# Patient Record
Sex: MALE | Race: WHITE | NOT HISPANIC OR LATINO | Employment: OTHER | URBAN - METROPOLITAN AREA
[De-identification: names, ages, dates, MRNs, and addresses within clinical notes are randomized per-mention and may not be internally consistent; named-entity substitution may affect disease eponyms.]

---

## 2017-02-08 ENCOUNTER — PATIENT MESSAGE (OUTPATIENT)
Dept: INTERNAL MEDICINE | Facility: CLINIC | Age: 52
End: 2017-02-08

## 2017-03-01 ENCOUNTER — OFFICE VISIT (OUTPATIENT)
Dept: INTERNAL MEDICINE | Facility: CLINIC | Age: 52
End: 2017-03-01
Payer: COMMERCIAL

## 2017-03-01 VITALS
OXYGEN SATURATION: 97 % | BODY MASS INDEX: 32.22 KG/M2 | WEIGHT: 225.06 LBS | HEIGHT: 70 IN | HEART RATE: 69 BPM | TEMPERATURE: 98 F | DIASTOLIC BLOOD PRESSURE: 70 MMHG | SYSTOLIC BLOOD PRESSURE: 118 MMHG

## 2017-03-01 DIAGNOSIS — Z12.83 SKIN CANCER SCREENING: ICD-10-CM

## 2017-03-01 DIAGNOSIS — M54.2 CHRONIC NECK AND BACK PAIN: Primary | ICD-10-CM

## 2017-03-01 DIAGNOSIS — E78.5 HYPERLIPIDEMIA, UNSPECIFIED HYPERLIPIDEMIA TYPE: ICD-10-CM

## 2017-03-01 DIAGNOSIS — M54.9 CHRONIC NECK AND BACK PAIN: Primary | ICD-10-CM

## 2017-03-01 DIAGNOSIS — E05.90 HYPERTHYROIDISM: ICD-10-CM

## 2017-03-01 DIAGNOSIS — G89.29 CHRONIC NECK AND BACK PAIN: Primary | ICD-10-CM

## 2017-03-01 PROCEDURE — 99999 PR PBB SHADOW E&M-EST. PATIENT-LVL IV: CPT | Mod: PBBFAC,,, | Performed by: INTERNAL MEDICINE

## 2017-03-01 PROCEDURE — 1160F RVW MEDS BY RX/DR IN RCRD: CPT | Mod: S$GLB,,, | Performed by: INTERNAL MEDICINE

## 2017-03-01 PROCEDURE — 99214 OFFICE O/P EST MOD 30 MIN: CPT | Mod: S$GLB,,, | Performed by: INTERNAL MEDICINE

## 2017-03-01 NOTE — MR AVS SNAPSHOT
[facility]  [address]  [facility] [address]  Phone: [phone]  Fax: [phone]                  Narciso Mckeon   3/1/2017 10:40 AM   Office Visit    Description:  Male : 1965   Provider:  Migdalia Connor MD   Department:  [facility]           Reason for Visit     Follow-up           Diagnoses this Visit        Comments    Chronic neck and back pain    -  Primary     Hyperthyroidism         Hyperlipidemia, unspecified hyperlipidemia type         Skin cancer screening                To Do List           Goals (5 Years of Data)     None      Follow-Up and Disposition     Return in about 6 months (around 2017).      [facility]     [facility] Care Line -  Assistance  Registered nurses in the [facility] Call Center provide clinical advisement, health education, appointment booking, and other advisory services.  Call for this free service at [phone].             Medications           Message regarding Medications     Verify the changes and/or additions to your medication regime listed below are the same as discussed with your clinician today.  If any of these changes or additions are incorrect, please notify your healthcare provider.        STOP taking these medications     influenza vaccine 60 mcg/0.5 mL injection            Verify that the below list of medications is an accurate representation of the medications you are currently taking.  If none reported, the list may be blank. If incorrect, please contact your healthcare provider. Carry this list with you in case of emergency.           Current Medications     aspirin (ECOTRIN) 81 MG EC tablet Take 1 tablet (81 mg total) by mouth once daily.    clonazePAM (KLONOPIN) 0.5 MG tablet Take 1 tablet (0.5 mg total) by mouth nightly as needed for Anxiety.    gabapentin (NEURONTIN) 300 MG capsule TAKE ONE CAPSULE BY MOUTH THREE TIMES DAILY    meclizine (ANTIVERT) 25 mg tablet  Take 1 tablet (25 mg total) by mouth 3 (three) times daily as needed for Dizziness.    meloxicam (MOBIC) 7.5 MG tablet Take 7.5 mg by mouth 2 (two) times daily.    rosuvastatin (CRESTOR) 20 MG tablet Take 0.5 tablets (10 mg total) by mouth once daily.    tizanidine (ZANAFLEX) 4 MG tablet Take 2 mg by mouth every 8 (eight) hours.            Clinical Reference Information           Your Vitals Were     BP                   118/70           Blood Pressure          Most Recent Value    BP  118/70      Allergies as of 3/1/2017     Bactrim [Sulfamethoxazole-trimethoprim]      Immunizations Administered on Date of Encounter - 3/1/2017     None      Orders Placed During Today's Visit      Normal Orders This Visit    Ambulatory Referral to Dermatology     Future Labs/Procedures Expected by Expires    Comprehensive metabolic panel  3/1/2017 (Approximate) 4/30/2018    Lipid panel  3/1/2017 4/30/2018    TSH  3/1/2017 4/30/2018      Language Assistance Services     ATTENTION: Language assistance services are available, free of charge. Please call [phone].      ATENCIÓN: Si habla español, tiene a frost disposición servicios gratuitos de asistencia lingüística. Llame al [phone].     RUTHY Ý: N?u b?n nói Ti?ng Vi?t, có các d?ch v? h? tr? ngôn ng? mi?n phí dành cho b?n. G?i s? [phone].         [facility] complies with applicable Federal civil rights laws and does not discriminate on the basis of race, color, national origin, age, disability, or sex.

## 2017-03-01 NOTE — PROGRESS NOTES
"Subjective:       Patient ID: Narciso Mckeon is a 51 y.o. male.    Chief Complaint: Follow-up    HPI   Back pain about the same. Neck pain w/ a pull feeling on the R neck. Aggravated by sitting a certain way. On gabapentin 300mg tid. Helps w/ sleep. Takes mobic 7.5mg bid, zanaflex 4mg q 8 prn. Has been doing a lot of cutting/trimming of backyard. Occasional myalgia in the hands. No numbness/tingling/weakness of the arms. Slight weight gain. When he wakes up in the morning he'll have some morning stiffness for a min. Occ w/ flutter in the chest that'll last 3 seconds and then resolves. Does not occur every week. Not assoc w/ SOB/dizziness/diaphoresis/nausea/vomiting. Assoc w/ stress.    C/o more snappy w/ wife. No depression.     CT chest 11/1/16 - R lung nodule - repeat in 6 mo.     Review of Systems  as above in HPI.     Objective:      Physical Exam    /70  Pulse 69  Temp 98.3 °F (36.8 °C)  Ht 5' 10" (1.778 m)  Wt 102.1 kg (225 lb 1.4 oz)  SpO2 97%  BMI 32.3 kg/m2    GEN - A+OX4, NAD   HEENT - PERRL, EOMI, OP clear  Neck - No thyromegaly or cervical LAD. No thyroid masses felt.  CV - RRR, no m/r   Chest - CTAB, no wheezing or rhonchi  Abd - S/NT/ND/+BS.   Ext - 2+BDP and radial pulses. No C/C/E.    Labs reviewed    Assessment/Plan     Narciso was seen today for follow-up.    Diagnoses and all orders for this visit:    Chronic neck and back pain - declines PT at this time. Previously FRANKY worked but pt reports he didn't have any anesthesia prior to the FRANKY and so it caused a lot of anxiety/pain to the point where he almost passed out. Would like to defer at this time.   -     mobic 7.5mg bid, gabapentin 300mg tid, zanaflex prn.     Hyperthyroidism  -     TSH; Future    Hyperlipidemia, unspecified hyperlipidemia type - Crestor 10mg daily.   -     Lipid panel; Future  -     Comprehensive metabolic panel; Future    Recommended trial of marriage counseling.  Return in about 6 months (around " 9/1/2017).      Migdalia Connor MD  Department of Internal Medicine - [facility]  10:58 AM

## 2017-04-03 DIAGNOSIS — M54.9 CHRONIC BACK PAIN: ICD-10-CM

## 2017-04-03 DIAGNOSIS — G89.29 CHRONIC BACK PAIN: ICD-10-CM

## 2017-04-03 RX ORDER — GABAPENTIN 300 MG/1
CAPSULE ORAL
Qty: 90 CAPSULE | Refills: 3 | Status: SHIPPED | OUTPATIENT
Start: 2017-04-03 | End: 2018-02-21 | Stop reason: SDUPTHER

## 2017-11-02 ENCOUNTER — TELEPHONE (OUTPATIENT)
Dept: GASTROENTEROLOGY | Facility: CLINIC | Age: 52
End: 2017-11-02

## 2017-11-02 DIAGNOSIS — K82.4 GALL BLADDER POLYP: Primary | ICD-10-CM

## 2017-11-02 NOTE — TELEPHONE ENCOUNTER
----- Message from Chad Webber MD sent at 11/2/2017  3:33 PM CDT -----  Patient need a a follow up US for GB polyp.  Chad Webber MD  ----- Message -----  From: Joann Mcclain MA  Sent: 11/2/2017   2:41 PM  To: Chad Webber MD    Does he need any follow up?

## 2017-11-03 ENCOUNTER — TELEPHONE (OUTPATIENT)
Dept: GASTROENTEROLOGY | Facility: CLINIC | Age: 52
End: 2017-11-03

## 2017-11-06 ENCOUNTER — TELEPHONE (OUTPATIENT)
Dept: GASTROENTEROLOGY | Facility: CLINIC | Age: 52
End: 2017-11-06

## 2017-11-06 NOTE — TELEPHONE ENCOUNTER
----- Message from Migdalia Jemima sent at 11/6/2017  1:11 PM CST -----  Contact: wife - lourdes neri - [phone]  martel - tejals u/s done on [facility] - please call wife - lourdes neri - [phone]

## 2017-11-08 ENCOUNTER — PATIENT MESSAGE (OUTPATIENT)
Dept: INTERNAL MEDICINE | Facility: CLINIC | Age: 52
End: 2017-11-08

## 2017-11-08 DIAGNOSIS — R91.1 PULMONARY NODULE: Primary | ICD-10-CM

## 2017-11-09 ENCOUNTER — HOSPITAL ENCOUNTER (OUTPATIENT)
Dept: RADIOLOGY | Facility: HOSPITAL | Age: 52
Discharge: HOME | End: 2017-11-09
Attending: INTERNAL MEDICINE
Payer: COMMERCIAL

## 2017-11-09 DIAGNOSIS — R91.1 PULMONARY NODULE: ICD-10-CM

## 2017-11-09 DIAGNOSIS — K82.4 GALL BLADDER POLYP: ICD-10-CM

## 2017-11-09 PROCEDURE — 71250 CT THORAX DX C-: CPT | Mod: TC

## 2017-11-09 PROCEDURE — 71250 CT THORAX DX C-: CPT | Mod: 26,,, | Performed by: RADIOLOGY

## 2017-11-09 PROCEDURE — 76700 US EXAM ABDOM COMPLETE: CPT | Mod: 26,,, | Performed by: RADIOLOGY

## 2017-11-09 PROCEDURE — 76700 US EXAM ABDOM COMPLETE: CPT | Mod: TC

## 2017-11-13 ENCOUNTER — TELEPHONE (OUTPATIENT)
Dept: GASTROENTEROLOGY | Facility: CLINIC | Age: 52
End: 2017-11-13

## 2017-11-13 DIAGNOSIS — R16.1 SPLEEN ENLARGED: Primary | ICD-10-CM

## 2017-11-13 NOTE — TELEPHONE ENCOUNTER
----- Message from Chad Webber MD sent at 11/11/2017  8:05 AM CST -----  Please let the patient know the US did not showed the GB polyp. The spleen was slightly enlarge. Need repeat US to follow up the spleen in 3 months.

## 2017-11-15 ENCOUNTER — TELEPHONE (OUTPATIENT)
Dept: GASTROENTEROLOGY | Facility: CLINIC | Age: 52
End: 2017-11-15

## 2017-11-15 NOTE — TELEPHONE ENCOUNTER
Result Notes     Notes Recorded by Chad Webber MD on 11/11/2017 at 8:05 AM CST  Please let the patient know the US did not showed the GB polyp. The spleen was slightly enlarge. Need repeat US to follow up the spleen in 3 months.     Patient informed. Patient did not want to schedule US at this time. He will call back.

## 2018-02-01 ENCOUNTER — PATIENT MESSAGE (OUTPATIENT)
Dept: INTERNAL MEDICINE | Facility: CLINIC | Age: 53
End: 2018-02-01

## 2018-02-01 ENCOUNTER — OFFICE VISIT (OUTPATIENT)
Dept: DERMATOLOGY | Facility: CLINIC | Age: 53
End: 2018-02-01
Payer: MEDICARE

## 2018-02-01 VITALS — HEIGHT: 70 IN | BODY MASS INDEX: 32.21 KG/M2 | WEIGHT: 225 LBS

## 2018-02-01 DIAGNOSIS — D23.9 ANGIOKERATOMA: ICD-10-CM

## 2018-02-01 DIAGNOSIS — L30.9 HAND DERMATITIS: ICD-10-CM

## 2018-02-01 DIAGNOSIS — L81.4 SOLAR LENTIGO: ICD-10-CM

## 2018-02-01 DIAGNOSIS — L82.1 SEBORRHEIC KERATOSES: Primary | ICD-10-CM

## 2018-02-01 DIAGNOSIS — D22.9 MULTIPLE BENIGN NEVI: ICD-10-CM

## 2018-02-01 PROCEDURE — 99999 PR PBB SHADOW E&M-EST. PATIENT-LVL II: CPT | Mod: PBBFAC,,, | Performed by: DERMATOLOGY

## 2018-02-01 PROCEDURE — 99212 OFFICE O/P EST SF 10 MIN: CPT | Mod: PBBFAC,PO | Performed by: DERMATOLOGY

## 2018-02-01 PROCEDURE — 99203 OFFICE O/P NEW LOW 30 MIN: CPT | Mod: S$GLB,,, | Performed by: DERMATOLOGY

## 2018-02-01 RX ORDER — BETAMETHASONE DIPROPIONATE 0.5 MG/G
OINTMENT TOPICAL
Qty: 45 G | Refills: 1 | Status: SHIPPED | OUTPATIENT
Start: 2018-02-01

## 2018-02-01 RX ORDER — ROSUVASTATIN CALCIUM 10 MG/1
10 TABLET, COATED ORAL DAILY
Qty: 90 TABLET | Refills: 3 | Status: SHIPPED | OUTPATIENT
Start: 2018-02-01 | End: 2019-02-22 | Stop reason: SDUPTHER

## 2018-02-01 RX ORDER — MULTIVIT WITH MINERALS/HERBS
1 TABLET ORAL DAILY
COMMUNITY

## 2018-02-01 NOTE — PROGRESS NOTES
Subjective:       Patient ID:  Narciso Mckeon is a 52 y.o. 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   Skin: Positive for dry skin, activity-related sunscreen use and wears hat.   Hematologic/Lymphatic: Bruises/bleeds easily.        Objective:    Physical Exam   Constitutional: He appears well-developed and well-nourished. No distress.   Neurological: He is alert and oriented to person, place, and time. He is not disoriented.   Psychiatric: He has a normal mood and affect.   Skin:   Areas Examined (abnormalities noted in diagram):   Scalp / Hair Palpated and Inspected  Head / Face Inspection Performed  Neck Inspection Performed  Chest / Axilla Inspection Performed  Abdomen Inspection Performed  Genitals / Buttocks / Groin Inspection Performed  Back Inspection Performed  RUE Inspected  LUE Inspection Performed  RLE Inspected  LLE Inspection Performed  Nails and Digits Inspection Performed                       Diagram Legend     Erythematous scaling macule/papule c/w actinic keratosis       Vascular papule c/w angioma      Pigmented verrucoid papule/plaque c/w seborrheic keratosis      Yellow umbilicated papule c/w sebaceous hyperplasia      Irregularly shaped tan macule c/w lentigo     1-2 mm smooth white  "papules consistent with Milia      Movable subcutaneous cyst with punctum c/w epidermal inclusion cyst      Subcutaneous movable cyst c/w pilar cyst      Firm pink to brown papule c/w dermatofibroma      Pedunculated fleshy papule(s) c/w skin tag(s)      Evenly pigmented macule c/w junctional nevus     Mildly variegated pigmented, slightly irregular-bordered macule c/w mildly atypical nevus      Flesh colored to evenly pigmented papule c/w intradermal nevus       Pink pearly papule/plaque c/w basal cell carcinoma      Erythematous hyperkeratotic cursted plaque c/w SCC      Surgical scar with no sign of skin cancer recurrence      Open and closed comedones      Inflammatory papules and pustules      Verrucoid papule consistent consistent with wart     Erythematous eczematous patches and plaques     Dystrophic onycholytic nail with subungual debris c/w onychomycosis     Umbilicated papule    Erythematous-base heme-crusted tan verrucoid plaque consistent with inflamed seborrheic keratosis     Erythematous Silvery Scaling Plaque c/w Psoriasis     See annotation      Assessment / Plan:        Seborrheic keratoses  These are benign inherited growths without a malignant potential. Reassurance given to patient. No treatment is necessary.     Solar lentigo  This is a benign hyperpigmented sun induced lesion. Daily sun protection will reduce the number of new lesions. Treatment of these benign lesions are considered cosmetic.    Angiokeratoma, scrotum,  Reassurance    Multiple benign nevi   Monitor for new mole or moles that are becoming bigger, darker, irritated, or developing irregular borders.    Hand dermatitis,  Continue O"Soren working hand  -     betamethasone dipropionate (DIPROLENE) 0.05 % ointment; AAA hand BID PRN flare  Dispense: 45 g; Refill: 1    Patient instructed in importance in daily sun protection of at least spf 30. Sun avoidance and topical protection discussed.   Recommend Elta MD (physician office) COTMAN " sensitive (available online) for daily use on face and neck.  Patient encouraged to wear hat for all outdoor exposure.   Also discussed sun protective clothing.             Follow-up in about 1 year (around 2/1/2019).

## 2018-02-21 ENCOUNTER — HOSPITAL ENCOUNTER (OUTPATIENT)
Dept: RADIOLOGY | Facility: HOSPITAL | Age: 53
Discharge: HOME | End: 2018-02-21
Attending: INTERNAL MEDICINE
Payer: COMMERCIAL

## 2018-02-21 ENCOUNTER — OFFICE VISIT (OUTPATIENT)
Dept: INTERNAL MEDICINE | Facility: CLINIC | Age: 53
End: 2018-02-21
Payer: COMMERCIAL

## 2018-02-21 VITALS
BODY MASS INDEX: 31.19 KG/M2 | HEIGHT: 70 IN | SYSTOLIC BLOOD PRESSURE: 110 MMHG | TEMPERATURE: 98 F | DIASTOLIC BLOOD PRESSURE: 70 MMHG | RESPIRATION RATE: 16 BRPM | WEIGHT: 217.88 LBS | HEART RATE: 99 BPM

## 2018-02-21 DIAGNOSIS — E05.00 GRAVES DISEASE: ICD-10-CM

## 2018-02-21 DIAGNOSIS — G89.29 CHRONIC LOW BACK PAIN, UNSPECIFIED BACK PAIN LATERALITY, WITH SCIATICA PRESENCE UNSPECIFIED: ICD-10-CM

## 2018-02-21 DIAGNOSIS — F41.9 ANXIETY: ICD-10-CM

## 2018-02-21 DIAGNOSIS — R16.1 SPLEEN ENLARGED: ICD-10-CM

## 2018-02-21 DIAGNOSIS — M54.5 CHRONIC LOW BACK PAIN, UNSPECIFIED BACK PAIN LATERALITY, WITH SCIATICA PRESENCE UNSPECIFIED: ICD-10-CM

## 2018-02-21 DIAGNOSIS — M54.2 CHRONIC NECK PAIN: ICD-10-CM

## 2018-02-21 DIAGNOSIS — M79.10 MYALGIA: ICD-10-CM

## 2018-02-21 DIAGNOSIS — G47.00 INSOMNIA, UNSPECIFIED TYPE: ICD-10-CM

## 2018-02-21 DIAGNOSIS — E78.5 HYPERLIPIDEMIA, UNSPECIFIED HYPERLIPIDEMIA TYPE: ICD-10-CM

## 2018-02-21 DIAGNOSIS — M54.6 CHRONIC THORACIC SPINE PAIN: Primary | ICD-10-CM

## 2018-02-21 DIAGNOSIS — G89.29 CHRONIC PAIN OF LEFT KNEE: ICD-10-CM

## 2018-02-21 DIAGNOSIS — M25.562 CHRONIC PAIN OF LEFT KNEE: ICD-10-CM

## 2018-02-21 DIAGNOSIS — G89.29 CHRONIC THORACIC SPINE PAIN: Primary | ICD-10-CM

## 2018-02-21 DIAGNOSIS — G89.29 CHRONIC NECK PAIN: ICD-10-CM

## 2018-02-21 PROCEDURE — 99215 OFFICE O/P EST HI 40 MIN: CPT | Mod: S$GLB,,, | Performed by: INTERNAL MEDICINE

## 2018-02-21 PROCEDURE — 99999 PR PBB SHADOW E&M-EST. PATIENT-LVL IV: CPT | Mod: PBBFAC,,, | Performed by: INTERNAL MEDICINE

## 2018-02-21 PROCEDURE — 99214 OFFICE O/P EST MOD 30 MIN: CPT | Mod: PBBFAC,25 | Performed by: INTERNAL MEDICINE

## 2018-02-21 PROCEDURE — 73562 X-RAY EXAM OF KNEE 3: CPT | Mod: TC,50

## 2018-02-21 PROCEDURE — 76700 US EXAM ABDOM COMPLETE: CPT | Mod: TC

## 2018-02-21 PROCEDURE — 73562 X-RAY EXAM OF KNEE 3: CPT | Mod: 26,50,, | Performed by: RADIOLOGY

## 2018-02-21 PROCEDURE — 76700 US EXAM ABDOM COMPLETE: CPT | Mod: 26,GC,, | Performed by: RADIOLOGY

## 2018-02-21 RX ORDER — OXYCODONE HYDROCHLORIDE 15 MG/1
15 TABLET ORAL DAILY PRN
COMMUNITY
Start: 2018-02-20

## 2018-02-21 RX ORDER — GABAPENTIN 300 MG/1
300 CAPSULE ORAL 3 TIMES DAILY
Qty: 90 CAPSULE | Refills: 11 | Status: SHIPPED | OUTPATIENT
Start: 2018-02-21 | End: 2019-09-30 | Stop reason: SDUPTHER

## 2018-02-21 RX ORDER — CLONAZEPAM 0.5 MG/1
0.5 TABLET ORAL NIGHTLY PRN
Qty: 30 TABLET | Refills: 0 | Status: SHIPPED | OUTPATIENT
Start: 2018-02-21 | End: 2022-02-16

## 2018-02-21 NOTE — PROGRESS NOTES
INTERNAL MEDICINE YEARLY VISIT NOTE      CHIEF COMPLAINT     Chief Complaint   Patient presents with    YEARLY    Low-back Pain     5/10       HPI     Narciso Mckeon is a 52 y.o. C male who presents for his yearly exam. Last seen 3/1/17.    Neck/LBP. Chronic. Gabapentin 300mg TID, mobic 7.5mg BID, zanaflex 4mg q8prn, oxycodone 15mg daily prn. Follows w/ Dr. Conner in [place].   C/o click in between the shoulder blades w/ wrong movement - started a few mo ago. No trauma. Occurring few days a week. Last for a second and then goes away. R middle 3 fingers periodically w/ numbness but this is chronic and ongoing for yrs. Reports when the clicking, he jerks his body and sometimes feels like he has tingling in his L leg from it.     C/o L knee pain. H/o torn meniscus and also frequent dislocation of the patella s/p L knee surgery 2014/2015. Pain on the outer knee that goes to the shin - last for a few hours. Pain is intermittent - occurs 3x/week. Started about a few mo ago. No trauma/inciting factor. Able to bare weight on the knees. Has ortho appt on Monday.     GB polyp not present on repeat US. Slightly enlarged spleen. Per phone note w/ GI, needs repeat US to f/u on the spleen in 3 mo from last US. Due. Scheduled for today.    H/o hyperthyroidism - last 2 yrs of TSH WNL.    HLD - crestor 10mg daily.  C/o myalgia in the hands sometimes.     Insomnia - wakes up at 2am every night. Son's pregnant fiancee passed away suddenly. When his mom passed away, was on short course of clonopin, which worked for him. Worried about son. Anxiety.     Past Medical History:  Past Medical History:   Diagnosis Date    Chronic neck and back pain     Graves disease     Hyperthyroidism 11/18/2015     Past Surgical History:  Past Surgical History:   Procedure Laterality Date    FINGER SURGERY      removal of steel    KNEE SURGERY Left 1/2015     Allergies:  Review of patient's allergies indicates:   Allergen Reactions     Bactrim [sulfamethoxazole-trimethoprim]      Possibly allergic reaction       Home Medications:  Prior to Admission medications    Medication Sig Start Date End Date Taking? Authorizing Provider   ASPIRIN (ASPIR-81 ORAL) Take by mouth.   Yes Historical Provider, MD   b complex vitamins tablet Take 1 tablet by mouth once daily.   Yes Historical Provider, MD   betamethasone dipropionate (DIPROLENE) 0.05 % ointment AAA hand BID PRN flare 2/1/18  Yes Alysia Orbien MD   gabapentin (NEURONTIN) 300 MG capsule TAKE ONE CAPSULE BY MOUTH THREE TIMES DAILY 4/3/17  Yes Migdalia Connor MD   meclizine (ANTIVERT) 25 mg tablet Take 1 tablet (25 mg total) by mouth 3 (three) times daily as needed for Dizziness. 11/9/16  Yes Migdalia Connor MD   meloxicam (MOBIC) 7.5 MG tablet Take 7.5 mg by mouth 2 (two) times daily. 9/3/16  Yes Historical Provider, MD   multivitamin capsule Take 1 capsule by mouth once daily.   Yes Historical Provider, MD   oxyCODONE (ROXICODONE) 15 MG Tab Take 15 mg by mouth daily as needed. 2/20/18  Yes Historical Provider, MD   rosuvastatin (CRESTOR) 10 MG tablet Take 1 tablet (10 mg total) by mouth once daily. 2/1/18 2/1/19 Yes Migdalia Connor MD   tizanidine (ZANAFLEX) 4 MG tablet Take 2 mg by mouth every 8 (eight) hours.  1/27/16  Yes Historical Provider, MD   clonazePAM (KLONOPIN) 0.5 MG tablet Take 1 tablet (0.5 mg total) by mouth nightly as needed for Anxiety. 12/7/16 12/7/17  Migdalia Connor MD       Family History:  Family History   Problem Relation Age of Onset    Heart disease Father 52     MI    Macular degeneration Mother     Melanoma Mother     Lupus Neg Hx     Eczema Neg Hx     Psoriasis Neg Hx        Social History:  Social History   Substance Use Topics    Smoking status: Former Smoker     Quit date: 11/2/2014    Smokeless tobacco: Never Used    Alcohol use Yes      Comment: occas       Review of Systems:  Review of Systems Comprehensive review of systems otherwise negative. See history/subjective section for  "more details.    Health Maintainence:   Td - reports UTD.  Flu  - needs flu vaccine.   C-SCOPE 4/1/15  Hep C screening 8/10/16    PHYSICAL EXAM     /70   Pulse 99   Temp 98.2 °F (36.8 °C) (Oral)   Resp 16   Ht 5' 10" (1.778 m)   Wt 98.8 kg (217 lb 14.4 oz)   BMI 31.27 kg/m²     GEN - A+OX4, NAD   HEENT - PERRL, EOMI, OP clear. MMM.   Neck - No thyromegaly or cervical LAD. No thyroid masses felt.  CV - RRR, no m/r   Chest - CTAB, no wheezing or rhonchi  Abd - S/NT/ND/+BS.   Ext - 2+BDP and radial pulses. No LE edema.   Neuro - 5/5 BUE and BLE strength. Sensation to light touch intact throughout. 2+ DTRs. Antalgic gait.  MSK - No spinal tenderness to palpation. Antalgic gait.   Skin - No rash.    LABS     Previous labs reviewed.    ASSESSMENT/PLAN     Narciso Mckeon is a 52 y.o. male with  Narciso was seen today for annual exam and low-back pain.    Diagnoses and all orders for this visit:    Chronic thoracic spine pain  -     Ambulatory Referral to [facility]    H/O Graves disease  -     TSH; Future; Expected date: 02/21/2018    Chronic pain of left knee  -     X-ray Knee Ortho Bilateral; Future; Expected date: 02/21/2018    Hyperlipidemia, unspecified hyperlipidemia type  -     Comprehensive metabolic panel; Future; Expected date: 02/21/2018  -     Lipid panel; Future; Expected date: 02/21/2018    Chronic neck pain  -     gabapentin (NEURONTIN) 300 MG capsule; Take 1 capsule (300 mg total) by mouth 3 (three) times daily.    Chronic low back pain, unspecified back pain laterality, with sciatica presence unspecified  -     gabapentin (NEURONTIN) 300 MG capsule; Take 1 capsule (300 mg total) by mouth 3 (three) times daily.    Insomnia, unspecified type - klonopin prn. Do not combine w/ oxycodone or other sedating meds.    Anxiety  -     clonazePAM (KLONOPIN) 0.5 MG tablet; Take 1 tablet (0.5 mg total) by mouth nightly as needed for Anxiety.  -     TSH; Future; Expected date: " 02/21/2018    Myalgia  -     Comprehensive metabolic panel; Future; Expected date: 02/21/2018  -     Magnesium; Future; Expected date: 02/21/2018    BMI 31.27,adult  -     CBC auto differential; Future; Expected date: 02/21/2018    Other orders  -     oxyCODONE (ROXICODONE) 15 MG Tab; Take 15 mg by mouth daily as needed.    Flu vaccine today.    RTC in 12 months, sooner if needed and depending on labs.    Migdalia Connor MD  Department of Internal Medicine - [facility]  10:07 AM

## 2018-02-23 ENCOUNTER — TELEPHONE (OUTPATIENT)
Dept: GASTROENTEROLOGY | Facility: CLINIC | Age: 53
End: 2018-02-23

## 2018-02-23 NOTE — TELEPHONE ENCOUNTER
----- Message from Chad Webber MD sent at 2/22/2018  3:23 PM CST -----  Please let the patient know the US showed    Subtle nodular contour of the liver, may be seen in the settings of liver cirrhosis.    Splenomegaly.    No gallbladder polyp    Need Follow up with Hepatology

## 2018-02-24 ENCOUNTER — TELEPHONE (OUTPATIENT)
Dept: INTERNAL MEDICINE | Facility: CLINIC | Age: 53
End: 2018-02-24

## 2018-02-24 DIAGNOSIS — R73.02 IGT (IMPAIRED GLUCOSE TOLERANCE): Primary | ICD-10-CM

## 2018-02-24 NOTE — TELEPHONE ENCOUNTER
Please call and let patient know that blood count, electrolytes, kidney function, cholesterol, thyroid function are normal.  One of his liver enzymes is still mildly elevated and this is likely due to potential cirrhosis seen on abdominal ultrasound.  Please make sure he follows up with hepatology. His fasting sugar is mildly elevated so I'd like to get an a1c to check his avg glucose over 3 months. Please schedule.

## 2018-02-26 ENCOUNTER — OFFICE VISIT (OUTPATIENT)
Dept: ORTHOPEDICS | Facility: CLINIC | Age: 53
End: 2018-02-26
Payer: COMMERCIAL

## 2018-02-26 VITALS
WEIGHT: 217 LBS | HEART RATE: 77 BPM | DIASTOLIC BLOOD PRESSURE: 77 MMHG | HEIGHT: 70 IN | SYSTOLIC BLOOD PRESSURE: 127 MMHG | BODY MASS INDEX: 31.07 KG/M2

## 2018-02-26 DIAGNOSIS — M25.362 KNEE INSTABILITY, LEFT: Primary | ICD-10-CM

## 2018-02-26 PROCEDURE — 97760 ORTHOTIC MGMT&TRAING 1ST ENC: CPT | Mod: PBBFAC,PN | Performed by: ORTHOPAEDIC SURGERY

## 2018-02-26 PROCEDURE — 99203 OFFICE O/P NEW LOW 30 MIN: CPT | Mod: S$GLB,,, | Performed by: ORTHOPAEDIC SURGERY

## 2018-02-26 PROCEDURE — 99213 OFFICE O/P EST LOW 20 MIN: CPT | Mod: PBBFAC,PN | Performed by: ORTHOPAEDIC SURGERY

## 2018-02-26 PROCEDURE — 99999 PR PBB SHADOW E&M-EST. PATIENT-LVL III: CPT | Mod: PBBFAC,,, | Performed by: ORTHOPAEDIC SURGERY

## 2018-02-26 NOTE — TELEPHONE ENCOUNTER
- Called patient to schedule f/u with Dr. Pickens  - Patient handed phone to his wife to make appt.  - Patient's wife accepted and confirmed appt on 3/12.

## 2018-02-27 NOTE — PROGRESS NOTES
Past Medical History:   Diagnosis Date    Chronic neck and back pain     Graves disease     Hyperthyroidism 11/18/2015       Past Surgical History:   Procedure Laterality Date    FINGER SURGERY      removal of steel    KNEE SURGERY Left 1/2015       Current Outpatient Prescriptions   Medication Sig    ASPIRIN (ASPIR-81 ORAL) Take by mouth.    b complex vitamins tablet Take 1 tablet by mouth once daily.    betamethasone dipropionate (DIPROLENE) 0.05 % ointment AAA hand BID PRN flare    clonazePAM (KLONOPIN) 0.5 MG tablet Take 1 tablet (0.5 mg total) by mouth nightly as needed for Anxiety.    gabapentin (NEURONTIN) 300 MG capsule Take 1 capsule (300 mg total) by mouth 3 (three) times daily.    meclizine (ANTIVERT) 25 mg tablet Take 1 tablet (25 mg total) by mouth 3 (three) times daily as needed for Dizziness.    meloxicam (MOBIC) 7.5 MG tablet Take 7.5 mg by mouth 2 (two) times daily.    multivitamin capsule Take 1 capsule by mouth once daily.    oxyCODONE (ROXICODONE) 15 MG Tab Take 15 mg by mouth daily as needed.    rosuvastatin (CRESTOR) 10 MG tablet Take 1 tablet (10 mg total) by mouth once daily.    tizanidine (ZANAFLEX) 4 MG tablet Take 2 mg by mouth every 8 (eight) hours.      No current facility-administered medications for this visit.        Review of patient's allergies indicates:   Allergen Reactions    Bactrim [sulfamethoxazole-trimethoprim]      Possibly allergic reaction       Family History   Problem Relation Age of Onset    Heart disease Father 52     MI    Macular degeneration Mother     Melanoma Mother     Lupus Neg Hx     Eczema Neg Hx     Psoriasis Neg Hx        Social History     Social History    Marital status:      Spouse name: N/A    Number of children: N/A    Years of education: N/A     Occupational History    disabled      Social History Main Topics    Smoking status: Former Smoker     Quit date: 11/2/2014    Smokeless tobacco: Never Used    Alcohol use  Yes      Comment: occas    Drug use: No    Sexual activity: Not on file     Other Topics Concern    Not on file     Social History Narrative    No narrative on file       Chief Complaint:   Chief Complaint   Patient presents with    Knee Pain     left knee pain        History of present illness: This is a 52-year-old male seen for left knee pain.  Patient states that he was told that he had arthritis in his knees.  Some days bother him more than others.  Has a history of a meniscus surgery back in 2014.  Pain is now laterally located and goes down the leg.  Target most of his pain is over the fibula.  Pain as a 4 out of 10.  He had an injury in the past but none that triggered this.      Review of Systems:    Constitution: Negative for chills, fever, and sweats.  Negative for unexplained weight loss.    HENT:  Negative for headaches and blurry vision.    Cardiovascular:Negative for chest pain or irregular heart beat. Negative for hypertension.    Respiratory:  Negative for cough and shortness of breath.    Gastrointestinal: Negative for abdominal pain, heartburn, melena, nausea, and vomitting.    Genitourinary:  Negative bladder incontinence and dysuria.    Musculoskeletal:  See HPI    Neurological: Negative for numbness.    Psychiatric/Behavioral: Negative for depression.  The patient is not nervous/anxious.      Endocrine: Negative for polyuria    Hematologic/Lymphatic: Negative for bleeding problem.  Does not bruise/bleed easily.    Skin: Negative for poor would healing and rash      Physical Examination:    Vital Signs:    Vitals:    02/26/18 1304   BP: 127/77   Pulse: 77       Body mass index is 31.14 kg/m².    This a well-developed, well nourished patient in no acute distress.  They are alert and oriented and cooperative to examination.  Pt. walks without an antalgic gait.      Examination of bilateral knees shows no rashes or erythema. There are no masses ecchymosis or effusion. Patient has full range of  motion from 0-130°. Patient is nontender to palpation over lateral joint line and nontender to palpation over the medial joint line. Patient has a - Lachman exam, - anterior drawer exam, and - posterior drawer exam. - Ruddy's exam. Knee is stable to varus and valgus stress. 5 out of 5 motor strength. Palpable distal pulses. Intact light touch sensation. Negative Patellofemoral crepitus      X-rays: X-rays of both knees are ordered and reviewed which show very mild arthritic changes     Assessment:: Left calf pain    Plan:  I reviewed the findings with him today.  Patient has very mild knee arthritis but the pain does not sound like it is arthritis.  It is below the knee and more in the calf and lateral leg.  I recommended a hinged knee brace just to help with activity.  Follow-up as needed.    We performed a custom orthotic/brace fitting, adjusting and training with the patient. The patient demonstrated understanding and proper care. This was performed for 15 minutes.          This note was created using Dragon voice recognition software that occasionally misinterpreted phrases or words.    Consult note is delivered via Epic messaging service.

## 2018-03-12 ENCOUNTER — LAB VISIT (OUTPATIENT)
Dept: LAB | Facility: HOSPITAL | Age: 53
End: 2018-03-12
Attending: INTERNAL MEDICINE
Payer: COMMERCIAL

## 2018-03-12 ENCOUNTER — PROCEDURE VISIT (OUTPATIENT)
Dept: HEPATOLOGY | Facility: CLINIC | Age: 53
End: 2018-03-12
Attending: INTERNAL MEDICINE
Payer: COMMERCIAL

## 2018-03-12 ENCOUNTER — OFFICE VISIT (OUTPATIENT)
Dept: HEPATOLOGY | Facility: CLINIC | Age: 53
End: 2018-03-12
Payer: COMMERCIAL

## 2018-03-12 VITALS
WEIGHT: 213.63 LBS | BODY MASS INDEX: 30.58 KG/M2 | OXYGEN SATURATION: 99 % | RESPIRATION RATE: 18 BRPM | TEMPERATURE: 98 F | HEIGHT: 70 IN | SYSTOLIC BLOOD PRESSURE: 134 MMHG | HEART RATE: 64 BPM | DIASTOLIC BLOOD PRESSURE: 73 MMHG

## 2018-03-12 DIAGNOSIS — R79.89 ABNORMAL LIVER FUNCTION TESTS: ICD-10-CM

## 2018-03-12 DIAGNOSIS — R73.02 IGT (IMPAIRED GLUCOSE TOLERANCE): ICD-10-CM

## 2018-03-12 DIAGNOSIS — R79.89 ABNORMAL LIVER FUNCTION TESTS: Primary | ICD-10-CM

## 2018-03-12 DIAGNOSIS — M25.50 JOINT PAIN: ICD-10-CM

## 2018-03-12 DIAGNOSIS — K76.0 NAFLD (NONALCOHOLIC FATTY LIVER DISEASE): ICD-10-CM

## 2018-03-12 LAB
CCP AB SER IA-ACNC: <0.5 U/ML
CRP SERPL-MCNC: 2.7 MG/L
ERYTHROCYTE [SEDIMENTATION RATE] IN BLOOD BY WESTERGREN METHOD: 15 MM/HR
ESTIMATED AVG GLUCOSE: 85 MG/DL
HBA1C MFR BLD HPLC: 4.6 %
RHEUMATOID FACT SERPL-ACNC: 10 IU/ML

## 2018-03-12 PROCEDURE — 86431 RHEUMATOID FACTOR QUANT: CPT

## 2018-03-12 PROCEDURE — 85651 RBC SED RATE NONAUTOMATED: CPT

## 2018-03-12 PROCEDURE — 91200 LIVER ELASTOGRAPHY: CPT | Mod: S$GLB,,, | Performed by: INTERNAL MEDICINE

## 2018-03-12 PROCEDURE — 99214 OFFICE O/P EST MOD 30 MIN: CPT | Mod: S$GLB,,, | Performed by: INTERNAL MEDICINE

## 2018-03-12 PROCEDURE — 86140 C-REACTIVE PROTEIN: CPT

## 2018-03-12 PROCEDURE — 86200 CCP ANTIBODY: CPT

## 2018-03-12 PROCEDURE — 86038 ANTINUCLEAR ANTIBODIES: CPT

## 2018-03-12 PROCEDURE — 99999 PR PBB SHADOW E&M-EST. PATIENT-LVL IV: CPT | Mod: PBBFAC,,, | Performed by: INTERNAL MEDICINE

## 2018-03-12 PROCEDURE — 83036 HEMOGLOBIN GLYCOSYLATED A1C: CPT

## 2018-03-12 NOTE — PROGRESS NOTES
Subjective:       Patient ID: Narciso Mckeon is a 52 y.o. male.    Chief Complaint: Fatty Liver    HPI  I saw this 52 y.o. man back in the hepatology clinic for follow up.  He was last seen in Sep 2016.    - raised GGTand ALP.    Body mass index is 30.65 kg/m².    Well  On gabapentin  - once a week max  Admission to hospital with severe infection (?ear) and high ferritin (2636) which has since dropped to 546.  Today his ferritin was 323    HFE gene negative.  Alk Phos normal    US repeated and LFTs elevated    Last alcohol - 3 weeks ago  6 pack per night until then    Abdo US: 2/21/2018  Subtle nodular contour of the liver, may be seen in the settings of liver cirrhosis.  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   HENT: Negative for hearing loss.    Eyes: Negative for discharge and visual disturbance.   Respiratory: Negative for cough, chest tightness, shortness of breath and wheezing.    Cardiovascular: Negative for chest pain, palpitations and leg swelling.   Gastrointestinal: Negative for abdominal distention, abdominal pain, constipation, diarrhea and nausea.   Genitourinary: Negative for dysuria and frequency.   Musculoskeletal: Negative for arthralgias and back pain.   Skin: Negative for pallor and rash.   Neurological: Negative for dizziness, tremors, speech difficulty and headaches.   Hematological: Negative for adenopathy.   Psychiatric/Behavioral: Negative for agitation and confusion.           Lab Results   Component Value Date    ALT 56 (H) 02/21/2018    AST 32  02/21/2018     (H) 02/22/2016    ALKPHOS 108 02/21/2018    BILITOT 0.9 02/21/2018     Past Medical History:   Diagnosis Date    Chronic neck and back pain     Graves disease     Hyperthyroidism 11/18/2015     Past Surgical History:   Procedure Laterality Date    FINGER SURGERY      removal of steel    KNEE SURGERY Left 1/2015     Current Outpatient Prescriptions   Medication Sig    ASPIRIN (ASPIR-81 ORAL) Take by mouth.    multivitamin capsule Take 1 capsule by mouth once daily.    rosuvastatin (CRESTOR) 10 MG tablet Take 1 tablet (10 mg total) by mouth once daily.    b complex vitamins tablet Take 1 tablet by mouth once daily.    betamethasone dipropionate (DIPROLENE) 0.05 % ointment AAA hand BID PRN flare    clonazePAM (KLONOPIN) 0.5 MG tablet Take 1 tablet (0.5 mg total) by mouth nightly as needed for Anxiety.    gabapentin (NEURONTIN) 300 MG capsule Take 1 capsule (300 mg total) by mouth 3 (three) times daily.    meclizine (ANTIVERT) 25 mg tablet Take 1 tablet (25 mg total) by mouth 3 (three) times daily as needed for Dizziness.    meloxicam (MOBIC) 7.5 MG tablet Take 7.5 mg by mouth 2 (two) times daily.    oxyCODONE (ROXICODONE) 15 MG Tab Take 15 mg by mouth daily as needed.    tizanidine (ZANAFLEX) 4 MG tablet Take 2 mg by mouth every 8 (eight) hours.      No current facility-administered medications for this visit.        Objective:      Physical Exam   Constitutional: He is oriented to person, place, and time. He appears well-nourished.   HENT:   Head: Normocephalic.   Eyes: Pupils are equal, round, and reactive to light.   Neck: No thyromegaly present.   Cardiovascular: Normal rate, regular rhythm and normal heart sounds.    Pulmonary/Chest: Effort normal and breath sounds normal. He has no wheezes.   Abdominal: Soft. He exhibits no distension and no mass. There is no tenderness.   Lymphadenopathy:     He has no cervical adenopathy.   Neurological: He is alert and oriented to person,  place, and time.   Skin: Skin is warm. No rash noted. No erythema.   Psychiatric: He has a normal mood and affect. His behavior is normal.       Assessment:       1. Abnormal liver function tests    2. NAFLD (nonalcoholic fatty liver disease)        Plan:   Overweight but no clear evidence of NAFLD.    - reassured that he does not have any significant liver disease  - fibroscan today shows F0 but stage 2 steatosis.  - ?element of alcohol overuse- admits to some overindulgence and PEth test today was 23.    - recommended no alcohol  Clinic in 3 months

## 2018-03-12 NOTE — PROCEDURES
Procedures   Fibroscan Procedure     Name: Narciso Mckeon  Date of Procedure : 2018   :: Narciso Pickens MD  Diagnosis: NAFLD    Probe: M    Fibroscan readin.8 KPa    Fibrosis:F 0-1     CAP readin dB/m    Steatosis: :S2

## 2018-03-13 LAB — ANA SER QL IF: NORMAL

## 2018-03-18 PROBLEM — R79.89 ABNORMAL LIVER FUNCTION TESTS: Status: ACTIVE | Noted: 2018-03-18

## 2018-03-18 PROBLEM — K76.0 NAFLD (NONALCOHOLIC FATTY LIVER DISEASE): Status: ACTIVE | Noted: 2018-03-18

## 2018-03-27 ENCOUNTER — OFFICE VISIT (OUTPATIENT)
Dept: INTERNAL MEDICINE | Facility: CLINIC | Age: 53
End: 2018-03-27
Payer: COMMERCIAL

## 2018-03-27 VITALS
TEMPERATURE: 99 F | DIASTOLIC BLOOD PRESSURE: 72 MMHG | BODY MASS INDEX: 30.64 KG/M2 | HEIGHT: 70 IN | HEART RATE: 73 BPM | OXYGEN SATURATION: 98 % | WEIGHT: 214 LBS | SYSTOLIC BLOOD PRESSURE: 126 MMHG | RESPIRATION RATE: 16 BRPM

## 2018-03-27 DIAGNOSIS — M54.6 PAIN IN THORACIC SPINE: Primary | ICD-10-CM

## 2018-03-27 PROCEDURE — 99204 OFFICE O/P NEW MOD 45 MIN: CPT | Mod: ,,, | Performed by: PHYSICAL MEDICINE & REHABILITATION

## 2018-03-27 NOTE — PROGRESS NOTES
"Subjective:       Patient ID: Narciso Mckeon is a 52 y.o. male.    Chief Complaint: Neck Pain (neck hurts for years, sees Lesley? problems with mid-back "clicking", has gotten better. when clicks, feels tingling in left leg.)    This is a 52-year-old woman who sees Dr. Connor for his primary care. He also has a history of chronic neck and low back pain and is followed by Dr. Maldonado. For the past couple of weeks or so she's been having problems with left periscapular discomfort which was worse with bending forward and would radiate from the back to the front at times. It hurt to take a deep breath. Not associated necessarily with movement of the arms. No constitutional symptoms. No bowel or bladder dysfunction. He is gradually getting better. Pain in the thoracic spine region is about a 1 out of 10. He says he has never had pain in that area before and has never had an evaluation of his thoracic spine.    Oswestry back disability index 38%  phq9=0      Review of Systems   Constitutional: Negative for chills, diaphoresis, fatigue, fever and unexpected weight change.   HENT: Negative for trouble swallowing.    Eyes: Negative for visual disturbance.   Respiratory: Negative for shortness of breath.    Cardiovascular: Negative for chest pain.   Gastrointestinal: Negative for abdominal pain, constipation, diarrhea, nausea and vomiting.   Genitourinary: Negative for difficulty urinating.   Musculoskeletal: Negative for arthralgias, back pain, gait problem, joint swelling, myalgias, neck pain and neck stiffness.   Neurological: Negative for dizziness, speech difficulty, weakness, light-headedness, numbness and headaches.       Objective:      Physical Exam   Constitutional: He is oriented to person, place, and time. He appears well-developed and well-nourished.   Neurological: He is alert and oriented to person, place, and time.   He is awake and in no acute distress  He has no point tenderness external lesions " or palpable masses about the thoracic spine.  Heart S1-S2 regular. No murmurs. No ectopy  Lungs are clear bilaterally with full and equal breath sounds  Range of motion both shoulders is normal and painless  Deep tendon reflexes are +1 in both upper extremities  Deep tender reflexes are +1 at both knees and trace at both ankles  Strength is normal in both upper and lower extremities       Assessment:       1. Pain in thoracic spine        Plan:         he has a nonfocal examination and no historical red flags. He is getting gradually better without intervention. I do not know what the etiology of his discomfort was but I see nothing here today that requires acute evaluation or treatment. I think he can follow-up with  which he intends to do anyway in about 2 weeks. He can follow-up with me on an as-needed basis

## 2018-04-18 ENCOUNTER — DOCUMENTATION ONLY (OUTPATIENT)
Dept: INTERNAL MEDICINE | Facility: CLINIC | Age: 53
End: 2018-04-18

## 2018-04-18 NOTE — PROGRESS NOTES
Outside records from [facility] reviewed 3/27/18    Chief complaint-neck pain.  52-year-old male with history of chronic neck and lower back pain followed by Dr. Maldonado.  Past few weeks with left periscapular discomfort, worse with bending forward and radiates from the back to the front at times.  Pain with taking in a deep breath.  Oswestry back disability index 38%    A/P:  Pain in thoracic spine-nonfocal exam and no historical red flags.  Gradually better without intervention.  Nothing that requires acute evaluation or treatment.  Follow-up with Dr. Maldonado. Has appt in 2 weeks.     The Dragon medical Dictation software was used during the dictation of portions or the entirety of this medical record.  Phonetic or grammatic errors may exist due to the use of this software. For clarification, refer to the author of the document.

## 2018-05-10 ENCOUNTER — PATIENT MESSAGE (OUTPATIENT)
Dept: HEPATOLOGY | Facility: CLINIC | Age: 53
End: 2018-05-10

## 2018-05-11 ENCOUNTER — TELEPHONE (OUTPATIENT)
Dept: HEPATOLOGY | Facility: CLINIC | Age: 53
End: 2018-05-11

## 2018-05-11 NOTE — TELEPHONE ENCOUNTER
----- Message from Yung Lara sent at 5/11/2018  3:57 PM CDT -----  Contact: patient   Patient has an appointment schedule for Thursday, June 14, 2018 and would like to cancel and reschedule for Friday, June 1, 2018.          Please call [phone]      Thanks

## 2018-05-17 ENCOUNTER — TELEPHONE (OUTPATIENT)
Dept: DERMATOLOGY | Facility: CLINIC | Age: 53
End: 2018-05-17

## 2018-05-17 DIAGNOSIS — R42 VERTIGO: ICD-10-CM

## 2018-05-17 NOTE — TELEPHONE ENCOUNTER
Spoke with wife and informed to keep her appt on 5/25.  Have cancellation for tomorrow 5/18 at 9:30 for .  Confirmed appt time will work and scheduled

## 2018-05-17 NOTE — TELEPHONE ENCOUNTER
----- Message from Sandra Suggs sent at 5/17/2018 12:27 PM CDT -----  Wife (Anya)mrn [id number] is requesting to give her appointment on May 25th at 8:00 to patient ()for rash and sores on fingers and hands that is painful/please call back at [phone] to reschedule or advise.

## 2018-05-18 ENCOUNTER — OFFICE VISIT (OUTPATIENT)
Dept: DERMATOLOGY | Facility: CLINIC | Age: 53
End: 2018-05-18
Payer: COMMERCIAL

## 2018-05-18 VITALS — HEIGHT: 70 IN | WEIGHT: 214 LBS | BODY MASS INDEX: 30.64 KG/M2

## 2018-05-18 DIAGNOSIS — L30.9 HAND DERMATITIS: Primary | ICD-10-CM

## 2018-05-18 PROCEDURE — 3008F BODY MASS INDEX DOCD: CPT | Mod: CPTII,S$GLB,, | Performed by: DERMATOLOGY

## 2018-05-18 PROCEDURE — 99999 PR PBB SHADOW E&M-EST. PATIENT-LVL III: CPT | Mod: PBBFAC,,, | Performed by: DERMATOLOGY

## 2018-05-18 PROCEDURE — 99213 OFFICE O/P EST LOW 20 MIN: CPT | Mod: S$GLB,,, | Performed by: DERMATOLOGY

## 2018-05-18 RX ORDER — MORPHINE SULFATE 15 MG/1
15 TABLET, FILM COATED, EXTENDED RELEASE ORAL
COMMUNITY
Start: 2018-04-20

## 2018-05-18 RX ORDER — MECLIZINE HYDROCHLORIDE 25 MG/1
25 TABLET ORAL 3 TIMES DAILY PRN
Qty: 30 TABLET | Refills: 1 | Status: SHIPPED | OUTPATIENT
Start: 2018-05-18 | End: 2019-09-30 | Stop reason: SDUPTHER

## 2018-05-18 RX ORDER — CLOBETASOL PROPIONATE 0.5 MG/G
CREAM TOPICAL
Qty: 45 G | Refills: 1 | Status: SHIPPED | OUTPATIENT
Start: 2018-05-18

## 2018-05-18 NOTE — PROGRESS NOTES
Subjective:       Patient ID:  Narciso Mckeon is a 52 y.o. 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  Relieving factors/Treatments tried: Rx topical steroids (betamethasone ointment)  Improvement on treatment: mild        Review of Systems   Constitutional: Negative for fever, chills and fatigue.   Skin: Positive for activity-related sunscreen use and wears hat. Negative for daily sunscreen use.   Hematologic/Lymphatic: Bruises/bleeds easily.        Objective:    Physical Exam   Skin:   Areas Examined (abnormalities noted in diagram):   Nails and Digits Inspection Performed             Diagram Legend     Erythematous scaling macule/papule c/w actinic keratosis       Vascular papule c/w angioma      Pigmented verrucoid papule/plaque c/w seborrheic keratosis      Yellow umbilicated papule c/w sebaceous hyperplasia      Irregularly shaped tan macule c/w lentigo     1-2 mm smooth white papules consistent with Milia      Movable subcutaneous cyst with punctum c/w epidermal inclusion cyst      Subcutaneous movable cyst c/w pilar cyst      Firm pink to brown papule c/w dermatofibroma      Pedunculated fleshy papule(s) c/w skin tag(s)      Evenly pigmented macule c/w junctional nevus     Mildly variegated pigmented, slightly irregular-bordered  macule c/w mildly atypical nevus      Flesh colored to evenly pigmented papule c/w intradermal nevus       Pink pearly papule/plaque c/w basal cell carcinoma      Erythematous hyperkeratotic cursted plaque c/w SCC      Surgical scar with no sign of skin cancer recurrence      Open and closed comedones      Inflammatory papules and pustules      Verrucoid papule consistent consistent with wart     Erythematous eczematous patches and plaques     Dystrophic onycholytic nail with subungual debris c/w onychomycosis     Umbilicated papule    Erythematous-base heme-crusted tan verrucoid plaque consistent with inflamed seborrheic keratosis     Erythematous Silvery Scaling Plaque c/w Psoriasis     See annotation          Assessment / Plan:        Hand dermatitis  Poor compliance with ointment, would prefer cream  BID use until improved  Resume O;Soren working hand cream, use q2hrs while awake    -     clobetasol (TEMOVATE) 0.05 % cream; Thin film to AA hands BID PRN flare  Dispense: 45 g; Refill: 1             Follow-up if symptoms worsen or fail to improve.

## 2018-05-25 ENCOUNTER — HOSPITAL ENCOUNTER (OUTPATIENT)
Dept: RADIOLOGY | Facility: CLINIC | Age: 53
Discharge: HOME | End: 2018-05-25
Attending: INTERNAL MEDICINE
Payer: COMMERCIAL

## 2018-05-25 ENCOUNTER — OFFICE VISIT (OUTPATIENT)
Dept: ENDOCRINOLOGY | Facility: CLINIC | Age: 53
End: 2018-05-25
Payer: COMMERCIAL

## 2018-05-25 ENCOUNTER — LAB VISIT (OUTPATIENT)
Dept: LAB | Facility: HOSPITAL | Age: 53
End: 2018-05-25
Attending: INTERNAL MEDICINE
Payer: COMMERCIAL

## 2018-05-25 VITALS
WEIGHT: 217.63 LBS | HEIGHT: 70 IN | RESPIRATION RATE: 18 BRPM | SYSTOLIC BLOOD PRESSURE: 117 MMHG | BODY MASS INDEX: 31.16 KG/M2 | DIASTOLIC BLOOD PRESSURE: 78 MMHG | HEART RATE: 78 BPM

## 2018-05-25 DIAGNOSIS — E04.9 GOITER: ICD-10-CM

## 2018-05-25 DIAGNOSIS — E05.90 HYPERTHYROIDISM: ICD-10-CM

## 2018-05-25 DIAGNOSIS — K76.0 NAFLD (NONALCOHOLIC FATTY LIVER DISEASE): ICD-10-CM

## 2018-05-25 DIAGNOSIS — E06.9 THYROIDITIS: ICD-10-CM

## 2018-05-25 DIAGNOSIS — E78.00 HYPERCHOLESTEROLEMIA: ICD-10-CM

## 2018-05-25 DIAGNOSIS — M54.9 OTHER CHRONIC BACK PAIN: ICD-10-CM

## 2018-05-25 DIAGNOSIS — R79.89 ABNORMAL THYROID BLOOD TEST: ICD-10-CM

## 2018-05-25 DIAGNOSIS — E78.5 HYPERLIPIDEMIA, UNSPECIFIED HYPERLIPIDEMIA TYPE: ICD-10-CM

## 2018-05-25 DIAGNOSIS — G89.29 OTHER CHRONIC BACK PAIN: ICD-10-CM

## 2018-05-25 DIAGNOSIS — E04.1 NODULAR THYROID DISEASE: ICD-10-CM

## 2018-05-25 DIAGNOSIS — E05.00 GRAVES DISEASE: ICD-10-CM

## 2018-05-25 DIAGNOSIS — E05.00 GRAVES DISEASE: Primary | ICD-10-CM

## 2018-05-25 DIAGNOSIS — E07.9 THYROID DISEASE: ICD-10-CM

## 2018-05-25 PROBLEM — M54.50 CHRONIC LOW BACK PAIN: Status: ACTIVE | Noted: 2018-05-25

## 2018-05-25 LAB
ALBUMIN SERPL BCP-MCNC: 4 G/DL
ALP SERPL-CCNC: 108 U/L
ALT SERPL W/O P-5'-P-CCNC: 33 U/L
AMYLASE SERPL-CCNC: 56 U/L
ANION GAP SERPL CALC-SCNC: 8 MMOL/L
AST SERPL-CCNC: 23 U/L
BILIRUB SERPL-MCNC: 0.9 MG/DL
BUN SERPL-MCNC: 17 MG/DL
CA-I BLDV-SCNC: 1.24 MMOL/L
CALCIUM SERPL-MCNC: 9.5 MG/DL
CHLORIDE SERPL-SCNC: 109 MMOL/L
CHOLEST SERPL-MCNC: 173 MG/DL
CHOLEST/HDLC SERPL: 4.1 {RATIO}
CO2 SERPL-SCNC: 23 MMOL/L
CREAT SERPL-MCNC: 1.2 MG/DL
EST. GFR  (AFRICAN AMERICAN): >60 ML/MIN/1.73 M^2
EST. GFR  (NON AFRICAN AMERICAN): >60 ML/MIN/1.73 M^2
GLUCOSE SERPL-MCNC: 119 MG/DL
HDLC SERPL-MCNC: 42 MG/DL
HDLC SERPL: 24.3 %
LDLC SERPL CALC-MCNC: 72 MG/DL
LIPASE SERPL-CCNC: 39 U/L
NONHDLC SERPL-MCNC: 131 MG/DL
POTASSIUM SERPL-SCNC: 4.2 MMOL/L
PROT SERPL-MCNC: 7.3 G/DL
SODIUM SERPL-SCNC: 140 MMOL/L
T3 SERPL-MCNC: 110 NG/DL
T4 FREE SERPL-MCNC: 0.99 NG/DL
THYROGLOB AB SERPL IA-ACNC: 64.3 IU/ML
THYROPEROXIDASE IGG SERPL-ACNC: 180.3 IU/ML
TRIGL SERPL-MCNC: 295 MG/DL
TSH SERPL DL<=0.005 MIU/L-ACNC: 0.89 UIU/ML

## 2018-05-25 PROCEDURE — 84432 ASSAY OF THYROGLOBULIN: CPT

## 2018-05-25 PROCEDURE — 76536 US EXAM OF HEAD AND NECK: CPT | Mod: 26,,, | Performed by: RADIOLOGY

## 2018-05-25 PROCEDURE — 36415 COLL VENOUS BLD VENIPUNCTURE: CPT | Mod: PO

## 2018-05-25 PROCEDURE — 80061 LIPID PANEL: CPT

## 2018-05-25 PROCEDURE — 86376 MICROSOMAL ANTIBODY EACH: CPT

## 2018-05-25 PROCEDURE — 3008F BODY MASS INDEX DOCD: CPT | Mod: CPTII,S$GLB,, | Performed by: INTERNAL MEDICINE

## 2018-05-25 PROCEDURE — 86800 THYROGLOBULIN ANTIBODY: CPT | Mod: 91

## 2018-05-25 PROCEDURE — 80053 COMPREHEN METABOLIC PANEL: CPT

## 2018-05-25 PROCEDURE — 99214 OFFICE O/P EST MOD 30 MIN: CPT | Mod: S$GLB,,, | Performed by: INTERNAL MEDICINE

## 2018-05-25 PROCEDURE — 84443 ASSAY THYROID STIM HORMONE: CPT

## 2018-05-25 PROCEDURE — 84480 ASSAY TRIIODOTHYRONINE (T3): CPT

## 2018-05-25 PROCEDURE — 84439 ASSAY OF FREE THYROXINE: CPT

## 2018-05-25 PROCEDURE — 82150 ASSAY OF AMYLASE: CPT

## 2018-05-25 PROCEDURE — 83519 RIA NONANTIBODY: CPT

## 2018-05-25 PROCEDURE — 84445 ASSAY OF TSI GLOBULIN: CPT

## 2018-05-25 PROCEDURE — 76536 US EXAM OF HEAD AND NECK: CPT | Mod: TC,PO

## 2018-05-25 PROCEDURE — 82330 ASSAY OF CALCIUM: CPT

## 2018-05-25 PROCEDURE — 86316 IMMUNOASSAY TUMOR OTHER: CPT

## 2018-05-25 PROCEDURE — 99999 PR PBB SHADOW E&M-EST. PATIENT-LVL IV: CPT | Mod: PBBFAC,,, | Performed by: INTERNAL MEDICINE

## 2018-05-25 PROCEDURE — 83690 ASSAY OF LIPASE: CPT

## 2018-05-25 NOTE — PROGRESS NOTES
"Subjective:      Patient ID: Narciso Mckeon is a 52 y.o. male.    Chief Complaint:   52 yr old gentleman with Graves disease seen for initial care visit today.    History of Present Illness    Patient is a 52 yr old gentleman with Graves disease  With hyperthyroidism seen for initial care visit today. He has been managed with long term methimazole therapy and had previously been seen on this account with Dr Geovanni Watson and Dr Huerta.  Patient has a background epworth score of 9. Patient has polysomogram done ~ 2 yrs ago and   Showed  RLS with loud snoring but no apnea.  Patient has been of methimazole now for ~ 2 yrs.  There is no family history of thyroid disease.   Patient was born and raised in [facility]. Patient has no history of residence outside the country.  Patients does does not contain excessive amounts of sea food, cabbage or soy products.  Patient stopped smoking in 2014 but had smoked for 20 yrs before.  Patient drinks ~ 3 beers /week.    The 10-year ASCVD risk score ([place] JUANITA Jr., et al., 2013) is: 3.7%    Values used to calculate the score:      Age: 52 years      Sex: Male      Is Non- : No      Diabetic: No      Tobacco smoker: No      Systolic Blood Pressure: 117 mmHg      Is BP treated: No      HDL Cholesterol: 50 mg/dL      Total Cholesterol: 189 mg/dL     Patients most recent fibroscan from 03/18 showed F0 and stage 2 steatosis.  Patient has persistent dry eyes and itching. He also has xerosis. He does have intermittent blurring of vision especially in the right eye.            Review of Systems    Objective: /78   Pulse 78   Resp 18   Ht 5' 10" (1.778 m)   Wt 98.7 kg (217 lb 9.5 oz)   BMI 31.22 kg/m²  Body surface area is 2.21 meters squared.         Physical Exam   Constitutional: He is oriented to person, place, and time. Vital signs are normal. He appears well-developed and well-nourished.  Non-toxic appearance. No distress.   Pleasant middle aged " gentleman. Not pale, anicteric and afebrile. Well hydrated and not in any acute distress.   HENT:   Head: Normocephalic and atraumatic. Head is without right periorbital erythema and without left periorbital erythema. Hair is normal.   Mouth/Throat: No oropharyngeal exudate.   Eyes: EOM and lids are normal. Pupils are equal, round, and reactive to light. Lids are everted and swept, no foreign bodies found. Right conjunctiva is not injected. Left conjunctiva is not injected. No scleral icterus.   No proptosis and no diplopia   Neck: Trachea normal, normal range of motion, full passive range of motion without pain and phonation normal. Neck supple. No JVD present. No tracheal tenderness present. Carotid bruit is not present. No tracheal deviation present. No thyroid mass and no thyromegaly present.   Cardiovascular: Normal rate, regular rhythm, normal heart sounds and normal pulses.  Exam reveals no gallop.    No murmur heard.  Pulmonary/Chest: Effort normal and breath sounds normal. No respiratory distress. He has no decreased breath sounds. He has no wheezes. He has no rales.   Abdominal: Soft. Bowel sounds are normal. He exhibits no distension and no mass. There is no hepatosplenomegaly. There is no tenderness.   Musculoskeletal: Normal range of motion. He exhibits no edema or tenderness.   No pedal edema. No digital clubbing nor thyroid acropachy. No pretibial myxedema but has fine tremor of outstretched hands   Lymphadenopathy:     He has no cervical adenopathy.   Neurological: He is alert and oriented to person, place, and time. He has normal strength and normal reflexes. He displays no tremor and normal reflexes. No cranial nerve deficit. He exhibits normal muscle tone. Gait normal.   Skin: Skin is warm, dry and intact. No bruising, no ecchymosis, no petechiae and no rash noted. He is not diaphoretic. No cyanosis or erythema. No pallor. Nails show no clubbing.   Diffuse xerosis   Psychiatric: He has a normal  mood and affect. His speech is normal and behavior is normal. Judgment and thought content normal. His mood appears not anxious. Cognition and memory are normal. He does not exhibit a depressed mood.   Vitals reviewed.      Lab Review:     Results for LIZETTE REILLY (MRN [id number]) as of 5/25/2018 10:23   Ref. Range 3/12/2018 10:34 3/12/2018 11:11   WBC Latest Ref Range: 3.90 - 12.70 K/uL 5.39    RBC Latest Ref Range: 4.60 - 6.20 M/uL 4.08 (L)    Hemoglobin Latest Ref Range: 14.0 - 18.0 g/dL 14.2    Hematocrit Latest Ref Range: 40.0 - 54.0 % 39.9 (L)    MCV Latest Ref Range: 82 - 98 fL 98    MCH Latest Ref Range: 27.0 - 31.0 pg 34.8 (H)    MCHC Latest Ref Range: 32.0 - 36.0 g/dL 35.6    RDW Latest Ref Range: 11.5 - 14.5 % 13.1    Platelets Latest Ref Range: 150 - 350 K/uL 181    MPV Latest Ref Range: 9.2 - 12.9 fL 9.9    Gran% Latest Ref Range: 38.0 - 73.0 % 61.4    Gran # (ANC) Latest Ref Range: 1.8 - 7.7 K/uL 3.3    Immature Granulocytes Latest Ref Range: 0.0 - 0.5 % 0.2    Immature Grans (Abs) Latest Ref Range: 0.00 - 0.04 K/uL 0.01    Lymph% Latest Ref Range: 18.0 - 48.0 % 27.6    Lymph # Latest Ref Range: 1.0 - 4.8 K/uL 1.5    Mono% Latest Ref Range: 4.0 - 15.0 % 7.8    Mono # Latest Ref Range: 0.3 - 1.0 K/uL 0.4    Eosinophil% Latest Ref Range: 0.0 - 8.0 % 2.4    Eos # Latest Ref Range: 0.0 - 0.5 K/uL 0.1    Basophil% Latest Ref Range: 0.0 - 1.9 % 0.6    Baso # Latest Ref Range: 0.00 - 0.20 K/uL 0.03    nRBC Latest Ref Range: 0 /100 WBC 0    Ferritin Latest Ref Range: 20.0 - 300.0 ng/mL 323 (H)    Sed Rate Latest Ref Range: 0 - 10 mm/Hr  15 (H)   CRP Latest Ref Range: 0.0 - 8.2 mg/L  2.7   Phosphatidylethanol Latest Ref Range: Negative ng/mL 23 (A)    Hemoglobin A1C Latest Ref Range: 4.0 - 5.6 %  4.6   Estimated Avg Glucose Latest Ref Range: 68 - 131 mg/dL  85   CCP Antibodies Latest Ref Range: <5.0 U/mL  <0.5   Rheumatoid Factor Latest Ref Range: 0.0 - 15.0 IU/mL  10.0   PATY Screen Latest Ref Range:  Negative <1:160   Negative <1:160   Ceruloplasmin Latest Ref Range: 15.0 - 45.0 mg/dL 23.0    Differential Method Unknown Automated        Assessment:     1. Graves disease  US Soft Tissue Head Neck Thyroid    T4, free    T3    Thyrotropin-Binding Inhibitory Immunoglobulin (TBII)    Thyroid stimulating immunoglobulin    Thyrotropin receptor antibody    TSH    Calcium, ionized   2. Hyperthyroidism  Thyroglobulin    TSH   3. Goiter  US Soft Tissue Head Neck Thyroid    Thyroglobulin    TSH    Calcitonin   4. Thyroid disease     5. Thyroiditis  Thyroglobulin    Anti-thyroglobulin antibody    Thyroid peroxidase antibody   6. Abnormal thyroid blood test     7. Hyperlipidemia, unspecified hyperlipidemia type  Comprehensive metabolic panel    Lipid panel    Amylase    Lipase   8. NAFLD (nonalcoholic fatty liver disease)  Comprehensive metabolic panel    Lipid panel    Amylase    Lipase   9. Other chronic back pain     10. Hypercholesterolemia     11. Nodular thyroid disease  US Soft Tissue Head Neck Thyroid    Calcitonin    Chromogranin A    Iodine, Serum      Regarding Graves disease; appears to be clinical quiescent at the moment as far as thyroid activity. Will recheck full TFTs and thyroid antibody profile.  Regarding thyroid nodular disease; to obtain ffup thyroid USS.  Regarding possible opthalmopathy; to obtain formal opthalmic referral to evaluate for this.  Depending on the current status as regards antibody titers and presence or absence of orbitopathy may decide on permanent treatment for Graves.  His hx of NAFLD essentially exclude methimazole or PTU use as an option and his recent history of long term smoking makes radio iodine ablation a less than optimal option. Elective thyroidectomy would be the preferred management option for him and I have informed him as much but will await results of latest labs as detailed above.  Regarding NAFLD; ongoing ffup as per hepatology group and rec as regarding ETOH intake as  per hepatology team.  Regarding hyperlipidemia; to continue antilipidemic therapy as before.        Plan:       FFup in ~ 4mths

## 2018-05-26 PROBLEM — E06.3 HASHIMOTO'S DISEASE: Status: ACTIVE | Noted: 2018-05-26

## 2018-05-29 LAB
CALCIT SERPL-MCNC: 5.5 PG/ML
CGA SERPL-MCNC: 59 NG/ML (ref 0–95)
IODINE SERPL-MCNC: 84 NG/ML (ref 40–92)
THRYOGLOBULIN INTERPRETATION: ABNORMAL
THYROGLOB AB SERPL-ACNC: 19 IU/ML
THYROGLOB SERPL-MCNC: 0.6 NG/ML
TSH RECEP AB SER-ACNC: <1 IU/L
TSI SER-ACNC: <0.1 IU/L

## 2018-06-01 ENCOUNTER — OFFICE VISIT (OUTPATIENT)
Dept: HEPATOLOGY | Facility: CLINIC | Age: 53
End: 2018-06-01
Payer: COMMERCIAL

## 2018-06-01 ENCOUNTER — LAB VISIT (OUTPATIENT)
Dept: LAB | Facility: HOSPITAL | Age: 53
End: 2018-06-01
Attending: INTERNAL MEDICINE
Payer: COMMERCIAL

## 2018-06-01 VITALS
HEIGHT: 70 IN | DIASTOLIC BLOOD PRESSURE: 76 MMHG | HEART RATE: 75 BPM | OXYGEN SATURATION: 97 % | WEIGHT: 212.5 LBS | BODY MASS INDEX: 30.42 KG/M2 | SYSTOLIC BLOOD PRESSURE: 124 MMHG

## 2018-06-01 DIAGNOSIS — R79.89 ABNORMAL LFTS: ICD-10-CM

## 2018-06-01 DIAGNOSIS — F10.10 EXCESSIVE DRINKING ALCOHOL: ICD-10-CM

## 2018-06-01 DIAGNOSIS — R79.89 ABNORMAL LFTS: Primary | ICD-10-CM

## 2018-06-01 DIAGNOSIS — K76.0 NAFLD (NONALCOHOLIC FATTY LIVER DISEASE): ICD-10-CM

## 2018-06-01 LAB
ALBUMIN SERPL BCP-MCNC: 4.3 G/DL
ALP SERPL-CCNC: 90 U/L
ALT SERPL W/O P-5'-P-CCNC: 27 U/L
ANION GAP SERPL CALC-SCNC: 7 MMOL/L
AST SERPL-CCNC: 23 U/L
BILIRUB SERPL-MCNC: 1.2 MG/DL
BUN SERPL-MCNC: 18 MG/DL
CALCIUM SERPL-MCNC: 9.6 MG/DL
CHLORIDE SERPL-SCNC: 109 MMOL/L
CO2 SERPL-SCNC: 27 MMOL/L
CREAT SERPL-MCNC: 1.2 MG/DL
EST. GFR  (AFRICAN AMERICAN): >60 ML/MIN/1.73 M^2
EST. GFR  (NON AFRICAN AMERICAN): >60 ML/MIN/1.73 M^2
FERRITIN SERPL-MCNC: 317 NG/ML
GLUCOSE SERPL-MCNC: 85 MG/DL
POTASSIUM SERPL-SCNC: 4.4 MMOL/L
PROT SERPL-MCNC: 7.4 G/DL
SODIUM SERPL-SCNC: 143 MMOL/L

## 2018-06-01 PROCEDURE — 82728 ASSAY OF FERRITIN: CPT

## 2018-06-01 PROCEDURE — 80321 ALCOHOLS BIOMARKERS 1OR 2: CPT

## 2018-06-01 PROCEDURE — 99999 PR PBB SHADOW E&M-EST. PATIENT-LVL III: CPT | Mod: PBBFAC,,, | Performed by: INTERNAL MEDICINE

## 2018-06-01 PROCEDURE — 3008F BODY MASS INDEX DOCD: CPT | Mod: CPTII,S$GLB,, | Performed by: INTERNAL MEDICINE

## 2018-06-01 PROCEDURE — 99214 OFFICE O/P EST MOD 30 MIN: CPT | Mod: S$GLB,,, | Performed by: INTERNAL MEDICINE

## 2018-06-01 PROCEDURE — 36415 COLL VENOUS BLD VENIPUNCTURE: CPT

## 2018-06-01 PROCEDURE — 80053 COMPREHEN METABOLIC PANEL: CPT

## 2018-06-01 NOTE — PROGRESS NOTES
Subjective:       Patient ID: Narciso Mckeon is a 52 y.o. male.    Chief Complaint: Follow-up    HPI  I saw this 52 y.o. man back in the hepatology clinic for follow up.  He was last seen in March 2018.    - raised GGTand ALP.  - likely alcohol related but also overweight    Body mass index is 30.49 kg/m².    Well  On gabapentin  - once a week max  Admission to hospital with severe infection (?ear) and high ferritin (2636) which has since dropped to 546.    HFE gene negative.  Alk Phos normal    No alcohol for 3 months  Recent diagnosis of hashimoto's - may need thyroidectomy    Abdo US: 2/21/2018  Subtle nodular contour of the liver, may be seen in the settings of liver cirrhosis.  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   HENT: Negative for hearing loss.    Eyes: Negative for discharge and visual disturbance.   Respiratory: Negative for cough, chest tightness, shortness of breath and wheezing.    Cardiovascular: Negative for chest pain, palpitations and leg swelling.   Gastrointestinal: Negative for abdominal distention, abdominal pain, constipation, diarrhea and nausea.   Genitourinary: Negative for dysuria and frequency.   Musculoskeletal: Negative for arthralgias and back pain.   Skin: Negative for pallor and rash.   Neurological: Negative for dizziness, tremors, speech difficulty and headaches.   Hematological: Negative for adenopathy.   Psychiatric/Behavioral: Negative for agitation and confusion.           Lab Results   Component Value Date    ALT 33 05/25/2018    AST  23 05/25/2018     (H) 02/22/2016    ALKPHOS 108 05/25/2018    BILITOT 0.9 05/25/2018     Past Medical History:   Diagnosis Date    Chronic neck and back pain     Graves disease     Hyperthyroidism 11/18/2015     Past Surgical History:   Procedure Laterality Date    FINGER SURGERY      removal of steel    KNEE SURGERY Left 1/2015     Current Outpatient Prescriptions   Medication Sig    ASPIR-LOW 81 mg EC tablet TAKE ONE TABLET BY MOUTH once a day    b complex vitamins tablet Take 1 tablet by mouth once daily.    betamethasone dipropionate (DIPROLENE) 0.05 % ointment AAA hand BID PRN flare    clobetasol (TEMOVATE) 0.05 % cream Thin film to AA hands BID PRN flare    clonazePAM (KLONOPIN) 0.5 MG tablet Take 1 tablet (0.5 mg total) by mouth nightly as needed for Anxiety.    gabapentin (NEURONTIN) 300 MG capsule Take 1 capsule (300 mg total) by mouth 3 (three) times daily.    meclizine (ANTIVERT) 25 mg tablet Take 1 tablet (25 mg total) by mouth 3 (three) times daily as needed.    meloxicam (MOBIC) 7.5 MG tablet Take 7.5 mg by mouth 2 (two) times daily.    morphine (MS CONTIN) 15 MG 12 hr tablet Take 15 mg by mouth as needed.    multivitamin capsule Take 1 capsule by mouth once daily.    oxyCODONE (ROXICODONE) 15 MG Tab Take 15 mg by mouth daily as needed.    rosuvastatin (CRESTOR) 10 MG tablet Take 1 tablet (10 mg total) by mouth once daily.    tizanidine (ZANAFLEX) 4 MG tablet Take 2 mg by mouth every 8 (eight) hours.      No current facility-administered medications for this visit.        Objective:      Physical Exam   Constitutional: He is oriented to person, place, and time. He appears well-nourished.   HENT:   Head: Normocephalic.   Eyes: Pupils are equal, round, and reactive to light.   Neck: No thyromegaly present.   Cardiovascular: Normal rate, regular rhythm and normal heart sounds.    Pulmonary/Chest: Effort normal and breath sounds normal. He has no wheezes.   Abdominal: Soft. He  exhibits no distension and no mass. There is no tenderness.   Lymphadenopathy:     He has no cervical adenopathy.   Neurological: He is alert and oriented to person, place, and time.   Skin: Skin is warm. No rash noted. No erythema.   Psychiatric: He has a normal mood and affect. His behavior is normal.       Assessment:       1. Abnormal LFTs    2. NAFLD (nonalcoholic fatty liver disease)    3. Excessive drinking alcohol        Plan:   Overweight but no clear evidence of NAFLD.    - reassured that he does not have any significant liver disease  - fibroscan in March 2018 shows F0 but stage 2 steatosis.  - ?element of alcohol overuse- admits to some overindulgence and PEth test today was positive    1) Continue abstinence from alcohol  2) Weight loss  3) Recheck LFTs/ferritin/Peth  4) Clinic in 6 months with fibroscan  Clinic in 3 months    - celiac disease  - no alcohol

## 2018-06-04 LAB — TSH BII SER-ACNC: 2 %INHIBITION

## 2018-06-11 ENCOUNTER — HOSPITAL ENCOUNTER (OUTPATIENT)
Dept: RADIOLOGY | Facility: HOSPITAL | Age: 53
Discharge: HOME | End: 2018-06-11
Attending: ORTHOPAEDIC SURGERY
Payer: COMMERCIAL

## 2018-06-11 ENCOUNTER — TELEPHONE (OUTPATIENT)
Dept: ORTHOPEDICS | Facility: CLINIC | Age: 53
End: 2018-06-11

## 2018-06-11 ENCOUNTER — OFFICE VISIT (OUTPATIENT)
Dept: ORTHOPEDICS | Facility: CLINIC | Age: 53
End: 2018-06-11
Payer: COMMERCIAL

## 2018-06-11 VITALS
BODY MASS INDEX: 30.35 KG/M2 | WEIGHT: 212 LBS | SYSTOLIC BLOOD PRESSURE: 135 MMHG | HEIGHT: 70 IN | HEART RATE: 69 BPM | DIASTOLIC BLOOD PRESSURE: 83 MMHG

## 2018-06-11 DIAGNOSIS — M76.71 PERONEAL TENDINITIS, RIGHT: Primary | ICD-10-CM

## 2018-06-11 DIAGNOSIS — M79.671 RIGHT FOOT PAIN: Primary | ICD-10-CM

## 2018-06-11 DIAGNOSIS — M79.671 RIGHT FOOT PAIN: ICD-10-CM

## 2018-06-11 LAB — PHOSPHATIDYLETHANOL (PETH): NEGATIVE NG/ML

## 2018-06-11 PROCEDURE — 3008F BODY MASS INDEX DOCD: CPT | Mod: CPTII,S$GLB,, | Performed by: ORTHOPAEDIC SURGERY

## 2018-06-11 PROCEDURE — 73630 X-RAY EXAM OF FOOT: CPT | Mod: TC,PN,RT

## 2018-06-11 PROCEDURE — 99213 OFFICE O/P EST LOW 20 MIN: CPT | Mod: S$GLB,,, | Performed by: ORTHOPAEDIC SURGERY

## 2018-06-11 PROCEDURE — 73630 X-RAY EXAM OF FOOT: CPT | Mod: 26,RT,, | Performed by: RADIOLOGY

## 2018-06-11 PROCEDURE — 99999 PR PBB SHADOW E&M-EST. PATIENT-LVL III: CPT | Mod: PBBFAC,,, | Performed by: ORTHOPAEDIC SURGERY

## 2018-06-11 NOTE — TELEPHONE ENCOUNTER
----- Message from Luzmaria Jaime sent at 6/11/2018  7:56 AM CDT -----  Type:  Same Day Appointment Request    Caller is requesting a same day appointment.  Caller declined first available appointment listed below.      Name of Caller:  Pt wife lourdes    Symptoms:  Swollen r  foot  Best Call Back Number:[phone]  Additional Information:     Pt  Wife  c alling to  Have  Pt  Fitted in today

## 2018-06-11 NOTE — PROGRESS NOTES
Past Medical History:   Diagnosis Date    Chronic neck and back pain     Graves disease     Hyperthyroidism 11/18/2015       Past Surgical History:   Procedure Laterality Date    FINGER SURGERY      removal of steel    KNEE SURGERY Left 1/2015       Current Outpatient Prescriptions   Medication Sig    ASPIR-LOW 81 mg EC tablet TAKE ONE TABLET BY MOUTH once a day    b complex vitamins tablet Take 1 tablet by mouth once daily.    betamethasone dipropionate (DIPROLENE) 0.05 % ointment AAA hand BID PRN flare    clobetasol (TEMOVATE) 0.05 % cream Thin film to AA hands BID PRN flare    clonazePAM (KLONOPIN) 0.5 MG tablet Take 1 tablet (0.5 mg total) by mouth nightly as needed for Anxiety.    gabapentin (NEURONTIN) 300 MG capsule Take 1 capsule (300 mg total) by mouth 3 (three) times daily.    meclizine (ANTIVERT) 25 mg tablet Take 1 tablet (25 mg total) by mouth 3 (three) times daily as needed.    meloxicam (MOBIC) 7.5 MG tablet Take 7.5 mg by mouth 2 (two) times daily.    morphine (MS CONTIN) 15 MG 12 hr tablet Take 15 mg by mouth as needed.    multivitamin capsule Take 1 capsule by mouth once daily.    oxyCODONE (ROXICODONE) 15 MG Tab Take 15 mg by mouth daily as needed.    rosuvastatin (CRESTOR) 10 MG tablet Take 1 tablet (10 mg total) by mouth once daily.    tizanidine (ZANAFLEX) 4 MG tablet Take 2 mg by mouth every 8 (eight) hours.      No current facility-administered medications for this visit.        Review of patient's allergies indicates:   Allergen Reactions    Bactrim [sulfamethoxazole-trimethoprim]      Possibly allergic reaction       Family History   Problem Relation Age of Onset    Heart disease Father 52        MI    Macular degeneration Mother     Melanoma Mother     Lupus Neg Hx     Eczema Neg Hx     Psoriasis Neg Hx        Social History     Social History    Marital status:      Spouse name: N/A    Number of children: N/A    Years of education: N/A     Occupational  History    disabled      Social History Main Topics    Smoking status: Former Smoker     Quit date: 11/2/2014    Smokeless tobacco: Never Used    Alcohol use Yes      Comment: occas    Drug use: No    Sexual activity: Not on file     Other Topics Concern    Not on file     Social History Narrative    No narrative on file       Chief Complaint:   Chief Complaint   Patient presents with    Right Foot - Pain       History of present illness: This is a 52-year-old male seen for right ankle pain. The just got back from a cruise and he is having some swelling over the anterolateral ankle.  Pain along the peroneal tendons.  Difficult time getting out of bed or getting out of a chair.  Rates the pain as a 5/10.  No prior treatment.      Review of Systems:    Constitution: Negative for chills, fever, and sweats.  Negative for unexplained weight loss.    HENT:  Negative for headaches and blurry vision.    Cardiovascular:Negative for chest pain or irregular heart beat. Negative for hypertension.    Respiratory:  Negative for cough and shortness of breath.    Gastrointestinal: Negative for abdominal pain, heartburn, melena, nausea, and vomitting.    Genitourinary:  Negative bladder incontinence and dysuria.    Musculoskeletal:  See HPI    Neurological: Negative for numbness.    Psychiatric/Behavioral: Negative for depression.  The patient is not nervous/anxious.      Endocrine: Negative for polyuria    Hematologic/Lymphatic: Negative for bleeding problem.  Does not bruise/bleed easily.    Skin: Negative for poor would healing and rash      Physical Examination:    Vital Signs:    Vitals:    06/11/18 1446   BP: 135/83   Pulse: 69       Body mass index is 30.42 kg/m².    This a well-developed, well nourished patient in no acute distress.  They are alert and oriented and cooperative to examination.  Pt. walks without an antalgic gait.      Examination of the patient's right foot and ankle shows no signs of rashes or  erythema. The patient has no ecchymosis or effusion or masses. The patient has a negative anterior drawer and talar tilting exam. The patient has full range of motion of ankle dorsiflexion, plantarflexion, inversion, and eversion. Patient has 5 out of 5 motor strength in all muscle groups. Patient has 2+ dorsalis pedis pulses and intact light touch sensation. The patient is nontender over both medial ankle ligaments and medial malleolus as well as lateral ankle ligaments and the lateral malleolus. Negative squeeze test.  Tenderness over the peroneal tendons.    Examination of the patient's left foot and ankle shows no signs of rashes or erythema. The patient has no ecchymosis or effusion or masses. The patient has a negative anterior drawer and talar tilting exam. The patient has full range of motion of ankle dorsiflexion, plantarflexion, inversion, and eversion. Patient has 5 out of 5 motor strength in all muscle groups. Patient has 2+ dorsalis pedis pulses and intact light touch sensation. The patient is nontender over both medial ankle ligaments and medial malleolus as well as lateral ankle ligaments and the lateral malleolus. Negative squeeze test.        X-rays: X-rays of right foot is ordered and reviewed which show no significant findings     Assessment::  Right peroneal tendinitis    Plan:  I reviewed the x-rays with him today.  We talked about peroneal tendinitis.  I recommended that he wear boots were something stiff.  Get back on the Mobic for 7-10 days.  Follow-up as needed        This note was created using Dragon voice recognition software that occasionally misinterpreted phrases or words.    Consult note is delivered via Epic messaging service.

## 2018-08-09 ENCOUNTER — INITIAL CONSULT (OUTPATIENT)
Dept: OPHTHALMOLOGY | Facility: CLINIC | Age: 53
End: 2018-08-09
Payer: COMMERCIAL

## 2018-08-09 DIAGNOSIS — Z83.518 FAMILY HISTORY OF MACULAR DEGENERATION: ICD-10-CM

## 2018-08-09 DIAGNOSIS — H47.391 MYELINATED OPTIC NERVE FIBER LAYER, RIGHT: ICD-10-CM

## 2018-08-09 DIAGNOSIS — E05.00 GRAVES DISEASE: ICD-10-CM

## 2018-08-09 DIAGNOSIS — H40.039 ANATOMICAL NARROW ANGLE: Primary | ICD-10-CM

## 2018-08-09 DIAGNOSIS — H52.7 REFRACTIVE ERROR: ICD-10-CM

## 2018-08-09 PROCEDURE — 92020 GONIOSCOPY: CPT | Mod: S$GLB,,, | Performed by: OPHTHALMOLOGY

## 2018-08-09 PROCEDURE — 99999 PR PBB SHADOW E&M-EST. PATIENT-LVL II: CPT | Mod: PBBFAC,,, | Performed by: OPHTHALMOLOGY

## 2018-08-09 PROCEDURE — 92014 COMPRE OPH EXAM EST PT 1/>: CPT | Mod: S$GLB,,, | Performed by: OPHTHALMOLOGY

## 2018-08-09 PROCEDURE — 92015 DETERMINE REFRACTIVE STATE: CPT | Mod: S$GLB,,, | Performed by: OPHTHALMOLOGY

## 2018-08-09 RX ORDER — MELOXICAM 15 MG/1
TABLET ORAL
COMMUNITY
Start: 2018-07-17 | End: 2018-08-09

## 2018-08-09 NOTE — PATIENT INSTRUCTIONS
Glaucoma, Narrow-Angle (Acute)    The eye is a fluid-filled globe with a lens in the front and a light-sensitive screen in the back (retina). The optic nerve conducts light signals from the retina to the brain and allows you to see visual images. Eye fluid is constantly produced within the eye. Excess fluid drains out into the bloodstream.  Narrow-angle glaucoma (NAG) occurs when there is a blockage in the fluid drainage system of the eye. This causes the internal eye pressure to rise rapidly. The high pressure in the eye reduces blood flow to the optic nerve, which causes a loss of vision. This condition may result in complete, permanent blindness within days, if it is not treated promptly.  The cause of the blockage may be problems with the fluid drainage system in the eye that have been present since birth. Or, the blockage may be a result of eye changes that happen as people get older. Narrow-angle glaucoma in one eye increases the risk of getting the same problem in the other eye. Your eye doctor will discuss preventive treatment with you.  The symptoms of NAG appear quickly. There is eye pain, redness, and blurred vision. You may also see halos around lighted objects. Nausea and vomiting are common.  You may have another attack of NAG if your pupil becomes dilated. Pupils dilate in darkness or dim light, when you are stressed or excited, and as a side effect of certain medicines. Medicines that can cause eye dilation include antihistamines (like diphenhydramine), tricyclic antidepressants (like amitriptyline), and eye drops used to dilate your pupils. Be alert to the symptoms of NAG, and seek medical help at once if they recur.  Eye drops, pills, and intravenous medicines are usually some of the first treatments used. Your eye doctor will probably recommend surgery or laser treatment. These treatments help drain the fluid and lower the pressure in the eye.  Home care  · Take prescribed medicines exactly as  directed.  · You may use acetaminophen or ibuprofen to control pain, unless another medicine was prescribed. (Note: If you have chronic liver or kidney disease, or have ever had a stomach ulcer or gastrointestinal bleeding, talk with your doctor before using these medicines.)  · Avoid medicines that dilate the pupil, including antihistamines, tricyclic antidepressants, and certain eye drops. Talk to your eye doctor about all medicines you take, including vitamins, supplements, herbal remedies, and prescription and over-the-counter medicines.  · Stress may trigger an attack of fluid blockage in the eye. Learn ways to manage your stress. There are many books and tapes on this topic; talk to your healthcare provider for suggestions.  · Protect your eyes. An eye injury can cause increased eye pressure. Wear safety glasses or goggles when you play sports, use tools or machinery, or work with chemicals.  Follow-up care  It is very important that you make a follow-up appointment to be seen by an ophthalmologist (a medical doctor who specializes in eye care) as soon as possible.  When to seek medical advice  Call your healthcare provider right away if any of these occur. They may be signs of another attack. Seek care at your local hospital emergency room.  · Increasing eye pain, redness, or swelling  · Headache, nausea, vomiting  · Increased blurring of vision  · Halos around lights  · Loss of vision  Date Last Reviewed: 6/22/2015  © 2508-1105 [employer]. [address] [place] [address], [place] [address]. All rights reserved. This information is not intended as a substitute for professional medical care. Always follow your healthcare professional's instructions.

## 2018-08-09 NOTE — LETTER
August 9, 2018      Juan C Michelle MD  [address]  [facility] [address]           [address]  [facility] [address]  Phone: [phone]          Patient: Narciso Mckeon   MR Number: [id number]   YOB: 1965   Date of Visit: 8/9/2018       Dear Dr. Juan C Michelle:    Thank you for referring Narciso Mckeon to me for evaluation. Attached you will find relevant portions of my assessment and plan of care.    If you have questions, please do not hesitate to call me. I look forward to following Narciso Mckeon along with you.    Sincerely,    Giuliana Story MD    Enclosure  CC:  No Recipients    If you would like to receive this communication electronically, please contact [email or web address] or [phone] to request more information on [employer] access.    For providers and/or their staff who would like to refer a patient to [facility], please contact us through our one-stop-shop provider referral line, [facility], at [phone].    If you feel you have received this communication in error or would no longer like to receive these types of communications, please e-mail [email or web address]

## 2018-08-10 ENCOUNTER — PATIENT MESSAGE (OUTPATIENT)
Dept: OPHTHALMOLOGY | Facility: CLINIC | Age: 53
End: 2018-08-10

## 2018-08-17 ENCOUNTER — OFFICE VISIT (OUTPATIENT)
Dept: GASTROENTEROLOGY | Facility: CLINIC | Age: 53
End: 2018-08-17
Payer: COMMERCIAL

## 2018-08-17 VITALS
DIASTOLIC BLOOD PRESSURE: 81 MMHG | WEIGHT: 228.38 LBS | SYSTOLIC BLOOD PRESSURE: 124 MMHG | BODY MASS INDEX: 32.69 KG/M2 | HEIGHT: 70 IN | HEART RATE: 66 BPM

## 2018-08-17 DIAGNOSIS — Z86.010 HISTORY OF COLON POLYPS: ICD-10-CM

## 2018-08-17 DIAGNOSIS — R79.89 ABNORMAL LIVER FUNCTION TESTS: ICD-10-CM

## 2018-08-17 DIAGNOSIS — K76.0 NAFLD (NONALCOHOLIC FATTY LIVER DISEASE): Primary | ICD-10-CM

## 2018-08-17 PROCEDURE — 99999 PR PBB SHADOW E&M-EST. PATIENT-LVL III: CPT | Mod: PBBFAC,,, | Performed by: INTERNAL MEDICINE

## 2018-08-17 PROCEDURE — 3008F BODY MASS INDEX DOCD: CPT | Mod: CPTII,S$GLB,, | Performed by: INTERNAL MEDICINE

## 2018-08-17 PROCEDURE — 99214 OFFICE O/P EST MOD 30 MIN: CPT | Mod: S$GLB,,, | Performed by: INTERNAL MEDICINE

## 2018-08-17 NOTE — LETTER
August 17, 2018      Migdalia Connor MD  [address]  [facility] [address]           [facility]  [address]  [facility] [address]  Phone: [phone]          Patient: Narciso Mckeon   MR Number: [id number]   YOB: 1965   Date of Visit: 8/17/2018       Dear Dr. Migdalia Connor:    Thank you for referring Narciso Mckeon to me for evaluation. Attached you will find relevant portions of my assessment and plan of care.    If you have questions, please do not hesitate to call me. I look forward to following Narciso Mckeon along with you.    Sincerely,    Keith Arellano MD    Enclosure  CC:  No Recipients    If you would like to receive this communication electronically, please contact [email or web address] or [phone] to request more information on [employer] Link access.    For providers and/or their staff who would like to refer a patient to [facility], please contact us through our one-stop-shop provider referral line, [facility], at [phone].    If you feel you have received this communication in error or would no longer like to receive these types of communications, please e-mail [email or web address]

## 2018-08-17 NOTE — PROGRESS NOTES
"Subjective:       Patient ID: Narciso Mckeon is a 52 y.o. male.    This patient is new to me but has been seen by hepatology at [facility] in the past.      Chief Complaint: Colon polyps      Patient seen for personal history of colon polyps, diagnosed 3 years ago, 7 in number, variable size, unknown histology (no report), with associated signs/symptoms absent, and alleviating/exacerbating factors including none.  His last colonoscopy was in 2015 with Dr. Wilson.  He denies family history of colon cancer.  No blood in stool, change in bowel habits, or weight loss.  No upper GI symptoms.  He has NAFLD and had ultrasound with suggestion of nodular liver, however, he was subsequently seen by hepatology and had fibroscan which showed F0.  No other complaints.        Review of Systems   Constitutional: Negative for chills, fatigue and fever.   HENT: Negative for trouble swallowing.    Respiratory: Negative for cough, shortness of breath and wheezing.    Cardiovascular: Negative for chest pain and palpitations.   Gastrointestinal: Negative for abdominal pain, constipation, diarrhea, nausea and vomiting.   All other systems reviewed and are negative.      Objective:       Vitals:    08/17/18 0816   BP: 124/81   Pulse: 66   Weight: 103.6 kg (228 lb 6.3 oz)   Height: 5' 10" (1.778 m)       Physical Exam   Constitutional: He is oriented to person, place, and time. He appears well-developed and well-nourished.   HENT:   Head: Normocephalic and atraumatic.   Mouth/Throat: Oropharynx is clear and moist.   Eyes: Conjunctivae are normal. Pupils are equal, round, and reactive to light. No scleral icterus.   Cardiovascular: Normal rate and regular rhythm.   No murmur heard.  Pulmonary/Chest: Effort normal and breath sounds normal. He has no wheezes.   Abdominal: Soft. Bowel sounds are normal. He exhibits no distension. There is no tenderness.   Neurological: He is alert and oriented to person, place, and time.   Vitals " reviewed.        Lab Results   Component Value Date    WBC 5.39 03/12/2018    HGB 14.2 03/12/2018    HCT 39.9 (L) 03/12/2018    MCV 98 03/12/2018     03/12/2018       CMP  Sodium   Date Value Ref Range Status   06/01/2018 143 136 - 145 mmol/L Final     Potassium   Date Value Ref Range Status   06/01/2018 4.4 3.5 - 5.1 mmol/L Final     Chloride   Date Value Ref Range Status   06/01/2018 109 95 - 110 mmol/L Final     CO2   Date Value Ref Range Status   06/01/2018 27 23 - 29 mmol/L Final     Glucose   Date Value Ref Range Status   06/01/2018 85 70 - 110 mg/dL Final     BUN, Bld   Date Value Ref Range Status   06/01/2018 18 6 - 20 mg/dL Final     Creatinine   Date Value Ref Range Status   06/01/2018 1.2 0.5 - 1.4 mg/dL Final     Calcium   Date Value Ref Range Status   06/01/2018 9.6 8.7 - 10.5 mg/dL Final     Total Protein   Date Value Ref Range Status   06/01/2018 7.4 6.0 - 8.4 g/dL Final     Albumin   Date Value Ref Range Status   06/01/2018 4.3 3.5 - 5.2 g/dL Final     Total Bilirubin   Date Value Ref Range Status   06/01/2018 1.2 (H) 0.1 - 1.0 mg/dL Final     Comment:     For infants and newborns, interpretation of results should be based  on gestational age, weight and in agreement with clinical  observations.  Premature Infant recommended reference ranges:  Up to 24 hours.............<8.0 mg/dL  Up to 48 hours............<12.0 mg/dL  3-5 days..................<15.0 mg/dL  6-29 days.................<15.0 mg/dL       Alkaline Phosphatase   Date Value Ref Range Status   06/01/2018 90 55 - 135 U/L Final     AST   Date Value Ref Range Status   06/01/2018 23 10 - 40 U/L Final     ALT   Date Value Ref Range Status   06/01/2018 27 10 - 44 U/L Final     Anion Gap   Date Value Ref Range Status   06/01/2018 7 (L) 8 - 16 mmol/L Final     eGFR if    Date Value Ref Range Status   06/01/2018 >60.0 >60 mL/min/1.73 m^2 Final     eGFR if non    Date Value Ref Range Status   06/01/2018 >60.0  >60 mL/min/1.73 m^2 Final     Comment:     Calculation used to obtain the estimated glomerular filtration  rate (eGFR) is the CKD-EPI equation.          Ultrasound was independently visualized and reviewed by me and showed fatty liver.    Further history was obtained from the patient's wife who was present throughout the interview and states that he is scheduled to follow up with hepatology in a few months.  History is otherwise as above in the HPI.         Old records from Dr. Pickens with hepatology reviewed and are as summarized above in the HPI.    Assessment:       1. NAFLD (nonalcoholic fatty liver disease)    2. Abnormal liver function tests    3. History of colon polyps        Plan:       1.  Advised diet/exercise/weight loss  2.  Schedule colonoscopy  3.  Further recommendations to follow after above.

## 2018-08-17 NOTE — H&P (VIEW-ONLY)
"Subjective:       Patient ID: Narciso Mckeon is a 52 y.o. male.    This patient is new to me but has been seen by hepatology at [facility] in the past.      Chief Complaint: Colon polyps      Patient seen for personal history of colon polyps, diagnosed 3 years ago, 7 in number, variable size, unknown histology (no report), with associated signs/symptoms absent, and alleviating/exacerbating factors including none.  His last colonoscopy was in 2015 with Dr. Wilson.  He denies family history of colon cancer.  No blood in stool, change in bowel habits, or weight loss.  No upper GI symptoms.  He has NAFLD and had ultrasound with suggestion of nodular liver, however, he was subsequently seen by hepatology and had fibroscan which showed F0.  No other complaints.        Review of Systems   Constitutional: Negative for chills, fatigue and fever.   HENT: Negative for trouble swallowing.    Respiratory: Negative for cough, shortness of breath and wheezing.    Cardiovascular: Negative for chest pain and palpitations.   Gastrointestinal: Negative for abdominal pain, constipation, diarrhea, nausea and vomiting.   All other systems reviewed and are negative.      Objective:       Vitals:    08/17/18 0816   BP: 124/81   Pulse: 66   Weight: 103.6 kg (228 lb 6.3 oz)   Height: 5' 10" (1.778 m)       Physical Exam   Constitutional: He is oriented to person, place, and time. He appears well-developed and well-nourished.   HENT:   Head: Normocephalic and atraumatic.   Mouth/Throat: Oropharynx is clear and moist.   Eyes: Conjunctivae are normal. Pupils are equal, round, and reactive to light. No scleral icterus.   Cardiovascular: Normal rate and regular rhythm.   No murmur heard.  Pulmonary/Chest: Effort normal and breath sounds normal. He has no wheezes.   Abdominal: Soft. Bowel sounds are normal. He exhibits no distension. There is no tenderness.   Neurological: He is alert and oriented to person, place, and time.   Vitals " reviewed.        Lab Results   Component Value Date    WBC 5.39 03/12/2018    HGB 14.2 03/12/2018    HCT 39.9 (L) 03/12/2018    MCV 98 03/12/2018     03/12/2018       CMP  Sodium   Date Value Ref Range Status   06/01/2018 143 136 - 145 mmol/L Final     Potassium   Date Value Ref Range Status   06/01/2018 4.4 3.5 - 5.1 mmol/L Final     Chloride   Date Value Ref Range Status   06/01/2018 109 95 - 110 mmol/L Final     CO2   Date Value Ref Range Status   06/01/2018 27 23 - 29 mmol/L Final     Glucose   Date Value Ref Range Status   06/01/2018 85 70 - 110 mg/dL Final     BUN, Bld   Date Value Ref Range Status   06/01/2018 18 6 - 20 mg/dL Final     Creatinine   Date Value Ref Range Status   06/01/2018 1.2 0.5 - 1.4 mg/dL Final     Calcium   Date Value Ref Range Status   06/01/2018 9.6 8.7 - 10.5 mg/dL Final     Total Protein   Date Value Ref Range Status   06/01/2018 7.4 6.0 - 8.4 g/dL Final     Albumin   Date Value Ref Range Status   06/01/2018 4.3 3.5 - 5.2 g/dL Final     Total Bilirubin   Date Value Ref Range Status   06/01/2018 1.2 (H) 0.1 - 1.0 mg/dL Final     Comment:     For infants and newborns, interpretation of results should be based  on gestational age, weight and in agreement with clinical  observations.  Premature Infant recommended reference ranges:  Up to 24 hours.............<8.0 mg/dL  Up to 48 hours............<12.0 mg/dL  3-5 days..................<15.0 mg/dL  6-29 days.................<15.0 mg/dL       Alkaline Phosphatase   Date Value Ref Range Status   06/01/2018 90 55 - 135 U/L Final     AST   Date Value Ref Range Status   06/01/2018 23 10 - 40 U/L Final     ALT   Date Value Ref Range Status   06/01/2018 27 10 - 44 U/L Final     Anion Gap   Date Value Ref Range Status   06/01/2018 7 (L) 8 - 16 mmol/L Final     eGFR if    Date Value Ref Range Status   06/01/2018 >60.0 >60 mL/min/1.73 m^2 Final     eGFR if non    Date Value Ref Range Status   06/01/2018 >60.0  >60 mL/min/1.73 m^2 Final     Comment:     Calculation used to obtain the estimated glomerular filtration  rate (eGFR) is the CKD-EPI equation.          Ultrasound was independently visualized and reviewed by me and showed fatty liver.    Further history was obtained from the patient's wife who was present throughout the interview and states that he is scheduled to follow up with hepatology in a few months.  History is otherwise as above in the HPI.         Old records from Dr. Pickens with hepatology reviewed and are as summarized above in the HPI.    Assessment:       1. NAFLD (nonalcoholic fatty liver disease)    2. Abnormal liver function tests    3. History of colon polyps        Plan:       1.  Advised diet/exercise/weight loss  2.  Schedule colonoscopy  3.  Further recommendations to follow after above.

## 2018-08-26 ENCOUNTER — PATIENT MESSAGE (OUTPATIENT)
Dept: OPHTHALMOLOGY | Facility: CLINIC | Age: 53
End: 2018-08-26

## 2018-09-05 ENCOUNTER — ANESTHESIA (OUTPATIENT)
Dept: ENDOSCOPY | Facility: HOSPITAL | Age: 53
End: 2018-09-05
Payer: COMMERCIAL

## 2018-09-05 ENCOUNTER — HOSPITAL ENCOUNTER (OUTPATIENT)
Facility: HOSPITAL | Age: 53
Discharge: HOME | End: 2018-09-05
Attending: INTERNAL MEDICINE | Admitting: INTERNAL MEDICINE
Payer: COMMERCIAL

## 2018-09-05 ENCOUNTER — ANESTHESIA EVENT (OUTPATIENT)
Dept: ENDOSCOPY | Facility: HOSPITAL | Age: 53
End: 2018-09-05
Payer: COMMERCIAL

## 2018-09-05 ENCOUNTER — PATIENT MESSAGE (OUTPATIENT)
Dept: OPHTHALMOLOGY | Facility: CLINIC | Age: 53
End: 2018-09-05

## 2018-09-05 DIAGNOSIS — Z86.010 HX OF COLONIC POLYPS: ICD-10-CM

## 2018-09-05 DIAGNOSIS — K63.5 POLYP OF COLON, UNSPECIFIED PART OF COLON, UNSPECIFIED TYPE: Primary | ICD-10-CM

## 2018-09-05 DIAGNOSIS — K64.8 INTERNAL HEMORRHOIDS: ICD-10-CM

## 2018-09-05 PROCEDURE — 27201089 HC SNARE, DISP (ANY): Performed by: INTERNAL MEDICINE

## 2018-09-05 PROCEDURE — 25000003 PHARM REV CODE 250: Performed by: INTERNAL MEDICINE

## 2018-09-05 PROCEDURE — 45385 COLONOSCOPY W/LESION REMOVAL: CPT | Mod: 33,,, | Performed by: INTERNAL MEDICINE

## 2018-09-05 PROCEDURE — 63600175 PHARM REV CODE 636 W HCPCS: Performed by: NURSE ANESTHETIST, CERTIFIED REGISTERED

## 2018-09-05 PROCEDURE — 45385 COLONOSCOPY W/LESION REMOVAL: CPT | Performed by: INTERNAL MEDICINE

## 2018-09-05 PROCEDURE — 88305 TISSUE EXAM BY PATHOLOGIST: CPT | Mod: 26,,, | Performed by: PATHOLOGY

## 2018-09-05 PROCEDURE — 37000009 HC ANESTHESIA EA ADD 15 MINS: Performed by: INTERNAL MEDICINE

## 2018-09-05 PROCEDURE — D9220A PRA ANESTHESIA: Mod: 33,CRNA,, | Performed by: NURSE ANESTHETIST, CERTIFIED REGISTERED

## 2018-09-05 PROCEDURE — D9220A PRA ANESTHESIA: Mod: 33,ANES,, | Performed by: ANESTHESIOLOGY

## 2018-09-05 PROCEDURE — 88305 TISSUE EXAM BY PATHOLOGIST: CPT | Performed by: PATHOLOGY

## 2018-09-05 PROCEDURE — 37000008 HC ANESTHESIA 1ST 15 MINUTES: Performed by: INTERNAL MEDICINE

## 2018-09-05 RX ORDER — LIDOCAINE HCL/PF 100 MG/5ML
SYRINGE (ML) INTRAVENOUS
Status: DISCONTINUED | OUTPATIENT
Start: 2018-09-05 | End: 2018-09-05

## 2018-09-05 RX ORDER — PROPOFOL 10 MG/ML
VIAL (ML) INTRAVENOUS
Status: DISCONTINUED | OUTPATIENT
Start: 2018-09-05 | End: 2018-09-05

## 2018-09-05 RX ORDER — SODIUM CHLORIDE 9 MG/ML
INJECTION, SOLUTION INTRAVENOUS CONTINUOUS
Status: DISCONTINUED | OUTPATIENT
Start: 2018-09-05 | End: 2018-09-05 | Stop reason: HOSPADM

## 2018-09-05 RX ORDER — PROPOFOL 10 MG/ML
INJECTION, EMULSION INTRAVENOUS
Status: COMPLETED
Start: 2018-09-05 | End: 2018-09-05

## 2018-09-05 RX ORDER — LIDOCAINE HYDROCHLORIDE 20 MG/ML
INJECTION, SOLUTION EPIDURAL; INFILTRATION; INTRACAUDAL; PERINEURAL
Status: DISCONTINUED
Start: 2018-09-05 | End: 2018-09-05 | Stop reason: HOSPADM

## 2018-09-05 RX ORDER — MELOXICAM 15 MG/1
15 TABLET ORAL DAILY
COMMUNITY

## 2018-09-05 RX ADMIN — PROPOFOL 40 MG: 10 INJECTION, EMULSION INTRAVENOUS at 09:09

## 2018-09-05 RX ADMIN — PROPOFOL 30 MG: 10 INJECTION, EMULSION INTRAVENOUS at 09:09

## 2018-09-05 RX ADMIN — SODIUM CHLORIDE 1000 ML: 0.9 INJECTION, SOLUTION INTRAVENOUS at 09:09

## 2018-09-05 RX ADMIN — PROPOFOL 100 MG: 10 INJECTION, EMULSION INTRAVENOUS at 09:09

## 2018-09-05 RX ADMIN — LIDOCAINE HYDROCHLORIDE 100 MG: 20 INJECTION, SOLUTION INTRAVENOUS at 09:09

## 2018-09-05 NOTE — TRANSFER OF CARE
"Anesthesia Transfer of Care Note    Patient: Narciso Mckeon    Procedure(s) Performed: Procedure(s) (LRB):  COLONOSCOPY (N/A)    Patient location: PACU    Anesthesia Type: general    Transport from OR: Transported from OR on room air with adequate spontaneous ventilation    Post pain: adequate analgesia    Post assessment: no apparent anesthetic complications    Post vital signs: stable    Level of consciousness: awake    Nausea/Vomiting: no nausea/vomiting    Complications: none    Transfer of care protocol was followed      Last vitals:   Visit Vitals  BP (!) 141/81 (BP Location: Left arm, Patient Position: Lying)   Pulse 83   Temp 37.2 °C (99 °F) (Oral)   Resp 10   Ht 5' 10" (1.778 m)   Wt 99.8 kg (220 lb)   SpO2 96%   BMI 31.57 kg/m²     "

## 2018-09-05 NOTE — DISCHARGE INSTRUCTIONS
Discharge Instructions: Eating a High-Fiber Diet  Your health care provider has prescribed a high-fiber diet for you. Fiber is what gives strength and structure to plants. Most grains, beans, vegetables, and fruits contain fiber. Foods rich in fiber are often low in calories and fat, but they fill you up more. These foods may also reduce the risk of certain health problems.  There are two types of fiber:  · Insoluble fiber. This is found in whole grains, cereals, and certain fruits and vegetables (such as apple skins, corn, and beans). Insoluble fiber is made up mainly of plant cell walls. It may prevent constipation and reduce the risk of certain types of cancer.  · Soluble fiber. This type of fiber is found in oats, beans, nuts, and certain fruits and vegetables (such as strawberries and peas). Soluble fiber turns to gel in the digestive system, slowing the movement of the digestive tract. It helps control blood sugar levels and can reduce cholesterol, which may help lower the risk of heart disease. Soluble fiber can also help control appetite.     Home care  · Know how much fiber you need a day. The recommended daily amount of fiber is 25 grams for women and 38 grams for men. After age 50, daily fiber needs drop to 21 grams for women and 30 grams for men.  · Ask your doctor about a fiber supplement. (Always take fiber supplements with a large glass of water.)  · Keep track of how much fiber you eat.  · Eat a variety of foods high in fiber.  · Learn to read and understand food labels.  · Ask your healthcare provider how much water you should be drinking.  · Look for these high-fiber foods:  ¨ Whole-grain breads and cereals  § 6 ounces a day give you about 18 grams of fiber (1 ounce is equal to 1 slice of bread, 1 cup of dry cereal, or 1/2 cup of cooked rice).  § Include wheat and oat bran cereals, whole-wheat muffins or toast, and corn tortillas in your meals.  ¨ Fruits   § 2 cups a day give you about 8 grams of  fiber.  § Apples, oranges, strawberries, pears, and bananas are good sources.  § Fruit juice does not contain as much fiber as the fruit it was made from.  ¨ Vegetables  § 2½ cups a day give you about 11 grams of fiber. Add asparagus, carrots, broccoli, peas, and corn to your meals.  ¨ Legumes  § 1/4 cup a day (in place of meat) gives you about 4 grams of fiber. Try navy beans, lentils, chickpeas, and soybeans.  ¨ Seeds   § A small handful of seeds gives you about 3 grams of fiber. Try sunflower seeds.  Follow-up  Make a follow-up appointment with a nutritionist as directed by our staff.  Date Last Reviewed: 6/1/2015 © 2000-2017 [employer]. [address] [place] [address]. All rights reserved. This information is not intended as a substitute for professional medical care. Always follow your healthcare professional's instructions.        Hemorrhoids    Hemorrhoids are swollen and inflamed veins inside the rectum and near the anus. The rectum is the last several inches of the colon. The anus is the passage between the rectum and the outside of the body.  Causes  The veins can become swollen due to increased pressure in them. This is most often caused by:  · Chronic constipation or diarrhea  · Straining when having a bowel movement  · Sitting too long on the toilet  · A low-fiber diet  · Pregnancy  Symptoms  · Bleeding from the rectum (this may be noticeable after bowel movements)  · Lump near the anus  · Itching around the anus  · Pain around the anus  There are different types of hemorrhoids. Depending on the type you have and the severity, you may be able to treat yourself at home. In some cases, a procedure may be the best treatment option. Your healthcare provider can tell you more about this, if needed.  Home care  General care  · To get relief from pain or itching, try:  ¨ Topical products. Your healthcare provider may prescribe or recommend creams, ointments, or pads that can be  applied to the hemorrhoid. Use these exactly as directed.  ¨ Medicines. Your healthcare provider may recommend stool softeners, suppositories, or laxatives to help manage constipation. Use these exactly as directed.  ¨ Sitz baths. A sitz bath involves sitting in a few inches of warm bath water. Be careful not to make the water so hot that you burn yourself--test it before sitting in it. Soak for about 10 to 15 minutes a few times a day. This may help relieve pain.  Tips to help prevent hemorrhoids  · Eat more fiber. Fiber adds bulk to stool and absorbs water as it moves through your colon. This makes stool softer and easier to pass.  ¨ Increase the fiber in your diet with more fiber-rich foods. These include fresh fruit, vegetables, and whole grains.  ¨ Take a fiber supplement or bulking agent, if advised to by your provider. These include products such as psyllium or methylcellulose.  · Drink plenty of water, if directed to by your provider. This can help keep stool soft.  · Be more active. Frequent exercise aids digestion and helps prevent constipation. It may also help make bowel movements more regular.  · Dont strain during bowel movements. This can make hemorrhoids more likely. Also, dont sit on the toilet for long periods of time.  Follow-up care  Follow up with your healthcare provider, or as advised. If a culture or imaging tests were done, you will be notified of the results when they are ready. This may take a few days or longer.  When to seek medical advice  Call your healthcare provider right away if any of these occur:  · Increased bleeding from the rectum  · Increased pain around the rectum or anus  · Weakness or dizziness  Call 911  Call 911 or return to the emergency department right away if any of these occur:  · Trouble breathing or swallowing  · Fainting or loss of consciousness  · Unusually fast heart rate  · Vomiting blood  · Large amounts of blood in stool  Date Last Reviewed: 6/22/2015  ©  8059-2474 [employer]. [address] [place] [address], [place] [address]. All rights reserved. This information is not intended as a substitute for professional medical care. Always follow your healthcare professional's instructions.        Understanding Colon and Rectal Polyps    The colon (also called the large intestine) is a muscular tube that forms the last part of the digestive tract. It absorbs water and stores food waste. The colon is about 4 to 6 feet long. The rectum is the last 6 inches of the colon. The colon and rectum have a smooth lining composed of millions of cells. Changes in these cells can lead to growths in the colon that can become cancerous and should be removed. Multiple tests are available to screen for colon cancer, but the colonoscopy is the most recommended test. During colonoscopy, these polyps can be removed. How often you need this test depends on many things including your condition, your family history, symptoms, and what the findings were at the previous colonoscopy.   When the colon lining changes  Changes that happen in the cells that line the colon or rectum can lead to growths called polyps. Over a period of years, polyps can turn cancerous. Removing polyps early may prevent cancer from ever forming.  Polyps  Polyps are fleshy clumps of tissue that form on the lining of the colon or rectum. Small polyps are usually benign (not cancerous). However, over time, cells in a polyp can change and become cancerous. Certain types of polyps known as adenomatous polyps are premalignant. The risk for invasive cancer increases with the size of the polyp and certain cell and gene features. This means that they can become cancerous if they're not removed. Hyperplastic polyps are benign. They can grow quite large and not turn cancerous.   Cancer  Almost all colorectal cancers start when polyp cells begin growing abnormally. As a cancerous tumor grows, it may involve more and more of the  "colon or rectum. In time, cancer can also grow beyond the colon or rectum and spread to nearby organs or to glands called lymph nodes. The cells can also travel to other parts of the body. This is known as metastasis. The earlier a cancerous tumor is removed, the better the chance of preventing its spread.    Date Last Reviewed: 8/1/2016  © 8790-0139 [employer]. [address] [place] [address], [place] [address]. All rights reserved. This information is not intended as a substitute for professional medical care. Always follow your healthcare professional's instructions.      Discharge Instructions: After Your Surgery/Procedure  Youve just had surgery. During surgery you were given medicine called anesthesia to keep you relaxed and free of pain. After surgery you may have some pain or nausea. This is common. Here are some tips for feeling better and getting well after surgery.     Stay on schedule with your medication.   Going home  Your doctor or nurse will show you how to take care of yourself when you go home. He or she will also answer your questions. Have an adult family member or friend drive you home.      For your safety we recommend these precaution for the first 24 hours after your procedure:  · Do not drive or use heavy equipment.  · Do not make important decisions or sign legal papers.  · Do not drink alcohol.  · Have someone stay with you, if needed. He or she can watch for problems and help keep you safe.  · Your concentration, balance, coordination, and judgement may be impaired for many hours after anesthesia.  Use caution when ambulating or standing up.     · You may feel weak and "washed out" after anesthesia and surgery.      Subtle residual effects of general anesthesia or sedation with regional / local anesthesia can last more than 24 hours.  Rest for the remainder of the day or longer if your Doctor/Surgeon has advised you to do so.  Although you may feel normal within the first 24 hours, " your reflexes and mental ability may be impaired without you realizing it.  You may feel dizzy, lightheaded or sleepy for 24 hours or longer.      Be sure to go to all follow-up visits with your doctor. And rest after your surgery for as long as your doctor tells you to.  Coping with pain  If you have pain after surgery, pain medicine will help you feel better. Take it as told, before pain becomes severe. Also, ask your doctor or pharmacist about other ways to control pain. This might be with heat, ice, or relaxation. And follow any other instructions your surgeon or nurse gives you.  Tips for taking pain medicine  To get the best relief possible, remember these points:  · Pain medicines can upset your stomach. Taking them with a little food may help.  · Most pain relievers taken by mouth need at least 20 to 30 minutes to start to work.  · Taking medicine on a schedule can help you remember to take it. Try to time your medicine so that you can take it before starting an activity. This might be before you get dressed, go for a walk, or sit down for dinner.  · Constipation is a common side effect of pain medicines. Call your doctor before taking any medicines such as laxatives or stool softeners to help ease constipation. Also ask if you should skip any foods. Drinking lots of fluids and eating foods such as fruits and vegetables that are high in fiber can also help. Remember, do not take laxatives unless your surgeon has prescribed them.  · Drinking alcohol and taking pain medicine can cause dizziness and slow your breathing. It can even be deadly. Do not drink alcohol while taking pain medicine.  · Pain medicine can make you react more slowly to things. Do not drive or run machinery while taking pain medicine.  Your health care provider may tell you to take acetaminophen to help ease your pain. Ask him or her how much you are supposed to take each day. Acetaminophen or other pain relievers may interact with your  prescription medicines or other over-the-counter (OTC) drugs. Some prescription medicines have acetaminophen and other ingredients. Using both prescription and OTC acetaminophen for pain can cause you to overdose. Read the labels on your OTC medicines with care. This will help you to clearly know the list of ingredients, how much to take, and any warnings. It may also help you not take too much acetaminophen. If you have questions or do not understand the information, ask your pharmacist or health care provider to explain it to you before you take the OTC medicine.  Managing nausea  Some people have an upset stomach after surgery. This is often because of anesthesia, pain, or pain medicine, or the stress of surgery. These tips will help you handle nausea and eat healthy foods as you get better. If you were on a special food plan before surgery, ask your doctor if you should follow it while you get better. These tips may help:  · Do not push yourself to eat. Your body will tell you when to eat and how much.  · Start off with clear liquids and soup. They are easier to digest.  · Next try semi-solid foods, such as mashed potatoes, applesauce, and gelatin, as you feel ready.  · Slowly move to solid foods. Dont eat fatty, rich, or spicy foods at first.  · Do not force yourself to have 3 large meals a day. Instead eat smaller amounts more often.  · Take pain medicines with a small amount of solid food, such as crackers or toast, to avoid nausea.     Call your surgeon if  · You still have pain an hour after taking medicine. The medicine may not be strong enough.  · You feel too sleepy, dizzy, or groggy. The medicine may be too strong.  · You have side effects like nausea, vomiting, or skin changes, such as rash, itching, or hives.       If you have obstructive sleep apnea  You were given anesthesia medicine during surgery to keep you comfortable and free of pain. After surgery, you may have more apnea spells because of  this medicine and other medicines you were given. The spells may last longer than usual.   At home:  · Keep using the continuous positive airway pressure (CPAP) device when you sleep. Unless your health care provider tells you not to, use it when you sleep, day or night. CPAP is a common device used to treat obstructive sleep apnea.  · Talk with your provider before taking any pain medicine, muscle relaxants, or sedatives. Your provider will tell you about the possible dangers of taking these medicines.  © 7961-1866 [employer]. [address] [place] [address], [place] [address]. All rights reserved. This information is not intended as a substitute for professional medical care. Always follow your healthcare professional's instructions.

## 2018-09-05 NOTE — ANESTHESIA PREPROCEDURE EVALUATION
09/05/2018  Narciso Mckeon is a 52 y.o., male.    Anesthesia Evaluation    I have reviewed the Patient Summary Reports.    I have reviewed the Nursing Notes.   I have reviewed the Medications.     Review of Systems  Anesthesia Hx:  No problems with previous Anesthesia    Social:  Former Smoker    Hematology/Oncology:  Hematology Normal   Oncology Normal     EENT/Dental:EENT/Dental Normal   Cardiovascular:   BATRES    Pulmonary:   Shortness of breath    Renal/:  Renal/ Normal     Hepatic/GI:   Liver Disease,    Musculoskeletal:   Arthritis   Spine Disorders: Degenerative disease and Disc disease    Neurological:  Neurology Normal    Endocrine:   Hyperthyroidism    Dermatological:  Skin Normal    Psych:  Psychiatric Normal           Physical Exam  General:  Obesity    Airway/Jaw/Neck:  Airway Findings: Mouth Opening: Normal Tongue: Normal  General Airway Assessment: Adult  Mallampati: II        Eyes/Ears/Nose:  EYES/EARS/NOSE FINDINGS: Normal   Dental:  DENTAL FINDINGS: Normal   Chest/Lungs:  Chest/Lungs Findings: Clear to auscultation, Normal Respiratory Rate     Heart/Vascular:  Heart Findings: Rate: Normal  Rhythm: Regular Rhythm        Mental Status:  Mental Status Findings:  Cooperative, Alert and Oriented         Anesthesia Plan  Type of Anesthesia, risks & benefits discussed:  Anesthesia Type:  general  Patient's Preference:   Intra-op Monitoring Plan: standard ASA monitors  Intra-op Monitoring Plan Comments:   Post Op Pain Control Plan:   Post Op Pain Control Plan Comments:   Induction:   IV  Beta Blocker:  Patient is not currently on a Beta-Blocker (No further documentation required).       Informed Consent: Patient understands risks and agrees with Anesthesia plan.  Questions answered. Anesthesia consent signed with patient.  ASA Score: 2     Day of Surgery Review of History & Physical:     H&P update referred to the provider.         Ready For Surgery From Anesthesia Perspective.

## 2018-09-05 NOTE — PROVATION PATIENT INSTRUCTIONS
Discharge Summary/Instructions after an Endoscopic Procedure  Patient Name: Narciso Mckeon  Patient MRN: [id number]  Patient YOB: 1965  Wednesday, September 05, 2018  Keith Berger MD  RESTRICTIONS:  During your procedure today, you received medications for sedation.  These   medications may affect your judgment, balance and coordination.  Therefore,   for 24 hours, you have the following restrictions:   - DO NOT drive a car, operate machinery, make legal/financial decisions,   sign important papers or drink alcohol.    ACTIVITY:  Today: no heavy lifting, straining or running due to procedural   sedation/anesthesia.  The following day: return to full activity including work.  DIET:  Eat and drink normally unless instructed otherwise.     TREATMENT FOR COMMON SIDE EFFECTS:  - Mild abdominal pain, nausea, belching, bloating or excessive gas:  rest,   eat lightly and use a heating pad.  - Sore Throat: treat with throat lozenges and/or gargle with warm salt   water.  - Because air was used during the procedure, expelling large amounts of air   from your rectum or belching is normal.  - If a bowel prep was taken, you may not have a bowel movement for 1-3 days.    This is normal.  SYMPTOMS TO WATCH FOR AND REPORT TO YOUR PHYSICIAN:  1. Abdominal pain or bloating, other than gas cramps.  2. Chest pain.  3. Back pain.  4. Signs of infection such as: chills or fever occurring within 24 hours   after the procedure.  5. Rectal bleeding, which would show as bright red, maroon, or black stools.   (A tablespoon of blood from the rectum is not serious, especially if   hemorrhoids are present.)  6. Vomiting.  7. Weakness or dizziness.  GO DIRECTLY TO THE NEAREST EMERGENCY ROOM IF YOU HAVE ANY OF THE FOLLOWING:      Difficulty breathing              Chills and/or fever over 101 F   Persistent vomiting and/or vomiting blood   Severe abdominal pain   Severe chest pain   Black, tarry stools   Bleeding- more than one  tablespoon   Any other symptom or condition that you feel may need urgent attention  Your doctor recommends these additional instructions:  If any biopsies were taken, your doctors clinic will contact you in 1 to 2   weeks with any results.  - Patient has a contact number available for emergencies.  The signs and   symptoms of potential delayed complications were discussed with the   patient.  Return to normal activities tomorrow.  Written discharge   instructions were provided to the patient.   - High fiber diet.   - Continue present medications.   - Await pathology results.   - Repeat colonoscopy in 5 years for surveillance.   - Discharge patient to home (ambulatory).   - Return to my office PRN.  For questions, problems or results please call your physician - Keith Berger MD at Work:  [phone].  [facility], EMERGENCY ROOM PHONE NUMBER: [phone]  IF A COMPLICATION OR EMERGENCY SITUATION ARISES AND YOU ARE UNABLE TO REACH   YOUR PHYSICIAN - GO DIRECTLY TO THE EMERGENCY ROOM.  Keith Berger MD  9/5/2018 9:47:50 AM  This report has been verified and signed electronically.  [employer]

## 2018-09-05 NOTE — ANESTHESIA POSTPROCEDURE EVALUATION
"Anesthesia Post Evaluation    Patient: Narciso Mckeon    Procedure(s) Performed: Procedure(s) (LRB):  COLONOSCOPY (N/A)    Final Anesthesia Type: general  Patient location during evaluation: PACU  Patient participation: Yes- Able to Participate  Level of consciousness: awake and alert  Post-procedure vital signs: reviewed and stable  Pain management: adequate  Airway patency: patent  PONV status at discharge: No PONV  Anesthetic complications: no      Cardiovascular status: hemodynamically stable  Respiratory status: unassisted and room air  Hydration status: euvolemic  Follow-up not needed.        Visit Vitals  /82   Pulse 78   Temp 37.1 °C (98.8 °F) (Temporal)   Resp 14   Ht 5' 10" (1.778 m)   Wt 99.8 kg (220 lb)   SpO2 (!) 91%   BMI 31.57 kg/m²       Pain/Arlette Score: Pain Assessment Performed: Yes (9/5/2018  9:47 AM)  Pain Rating Prior to Med Admin: 4 (9/5/2018  8:59 AM)  Pain Rating Post Med Admin: 4 (9/5/2018  8:59 AM)        "

## 2018-09-06 VITALS
OXYGEN SATURATION: 97 % | BODY MASS INDEX: 31.5 KG/M2 | RESPIRATION RATE: 15 BRPM | WEIGHT: 220 LBS | TEMPERATURE: 99 F | SYSTOLIC BLOOD PRESSURE: 154 MMHG | HEIGHT: 70 IN | DIASTOLIC BLOOD PRESSURE: 95 MMHG | HEART RATE: 68 BPM

## 2018-09-07 ENCOUNTER — PATIENT MESSAGE (OUTPATIENT)
Dept: GASTROENTEROLOGY | Facility: CLINIC | Age: 53
End: 2018-09-07

## 2018-10-05 DIAGNOSIS — Z86.39 HISTORY OF HYPERTHYROIDISM: ICD-10-CM

## 2018-10-05 DIAGNOSIS — R94.31 NONSPECIFIC ABNORMAL ELECTROCARDIOGRAM (ECG) (EKG): Primary | ICD-10-CM

## 2018-10-05 DIAGNOSIS — E06.9 THYROIDITIS: ICD-10-CM

## 2018-10-15 ENCOUNTER — HOSPITAL ENCOUNTER (OUTPATIENT)
Dept: CARDIOLOGY | Facility: HOSPITAL | Age: 53
Discharge: HOME | End: 2018-10-15
Attending: SPECIALIST
Payer: COMMERCIAL

## 2018-10-15 ENCOUNTER — TELEPHONE (OUTPATIENT)
Dept: ENDOCRINOLOGY | Facility: CLINIC | Age: 53
End: 2018-10-15

## 2018-10-15 VITALS — SYSTOLIC BLOOD PRESSURE: 116 MMHG | HEART RATE: 71 BPM | DIASTOLIC BLOOD PRESSURE: 85 MMHG

## 2018-10-15 DIAGNOSIS — R94.31 NONSPECIFIC ABNORMAL ELECTROCARDIOGRAM (ECG) (EKG): ICD-10-CM

## 2018-10-15 PROCEDURE — 93351 STRESS TTE COMPLETE: CPT

## 2018-10-15 NOTE — TELEPHONE ENCOUNTER
Wife wants to be seen asap. Refused to take a appointment with Ms. Molina. Wants worked in because he was to be told if he needed surgery at this visit.

## 2018-10-16 NOTE — TELEPHONE ENCOUNTER
Kindly inform Mr Mckeon that his thyroid tests from may showed no evidence of the Graves disease nor thyroid overactivity. Instead the labs at that time indicated that his thyroid had switched to manifesting antibodies of Hashimoto's disease. There was no indication for surgery no any other active treatment then. To see what his current thyroid status is I have ordered for him to get his thyroid function tests repeated. He can get these done at his convenience non fasting. The current lab results will enable us tell if anything significant has changed since then regarding his thyroid function.   Thanks     Juan C Michelle MD (Routing comment)     Called patient and informed him of the message from Dr. Michelle. Patient verbalized understanding and says he will do the labs next week.

## 2018-10-16 NOTE — TELEPHONE ENCOUNTER
Advised patient we are awaiting to see if would need to be see ASAP with Dr Michelle for surgery.. Patient advised he needs surgery last labs results advised  (Yogesh Mckeon and here are the thyroid antibody test results we have back thus. These seem to suggest that the Graves disease you had in the past may have resolved and instead flipped to Hashimoto's disease which we sometimes see. I am still awaiting the results of two other thyroid antibody tests for Graves disease and will inform you when those results become available.   Juan C Michelle MD )  Patient does not wish to see bright if he needs surgery would you like to do labs on patient?

## 2018-10-16 NOTE — TELEPHONE ENCOUNTER
Would like to only see Dr Michelle need to have consult for surgery or if he needs surgery at this time

## 2018-10-16 NOTE — TELEPHONE ENCOUNTER
----- Message from Regis Mckeon sent at 10/16/2018 10:44 AM CDT -----  Contact: wife Anya   Wife Anya need to speak with a nurse to discuss surgery for patient. Please call back at [phone]

## 2018-10-18 ENCOUNTER — LAB VISIT (OUTPATIENT)
Dept: LAB | Facility: HOSPITAL | Age: 53
End: 2018-10-18
Attending: INTERNAL MEDICINE
Payer: COMMERCIAL

## 2018-10-18 DIAGNOSIS — E06.9 THYROIDITIS: ICD-10-CM

## 2018-10-18 DIAGNOSIS — Z86.39 HISTORY OF HYPERTHYROIDISM: ICD-10-CM

## 2018-10-18 LAB
AORTIC ROOT ANNULUS: 3.32 CM
AORTIC VALVE CUSP SEPERATION: 1.87 CM
CV ECHO LV RWT: 0.45 CM
CV STRESS BASE HR: 78
DIASTOLIC BLOOD PRESSURE: 85
DOP CALC LVOT AREA: 2.98 CM2
DOP CALC LVOT DIAMETER: 1.95 CM
ECHO LV POSTERIOR WALL: 1.09 CM (ref 0.6–1.1)
FRACTIONAL SHORTENING: 34 % (ref 28–44)
INTERVENTRICULAR SEPTUM: 1.13 CM (ref 0.6–1.1)
LEFT ATRIUM SIZE: 4.49 CM
LEFT INTERNAL DIMENSION IN SYSTOLE: 3.24 CM (ref 2.1–4)
LEFT VENTRICLE DIASTOLIC VOLUME: 113.88 ML
LEFT VENTRICLE SYSTOLIC VOLUME: 42.24 ML
LEFT VENTRICULAR INTERNAL DIMENSION IN DIASTOLE: 4.92 CM (ref 3.5–6)
LEFT VENTRICULAR MASS: 204.36 G
OHS CV CPX 85 PERCENT MAX PREDICTED HEART RATE MALE: 142
OHS CV CPX MAX PREDICTED HEART RATE: 167
OHS CV CPX PATIENT IS FEMALE: 0
OHS CV CPX PATIENT IS MALE: 1
OHS CV CPX PEAK DIASTOLIC BLOOD PRESSURE: 72 MMHG
OHS CV CPX PEAK HEAR RATE: 162
OHS CV CPX PEAK RATE PRESSURE PRODUCT: ABNORMAL
OHS CV CPX PEAK SYSTOLIC BLOOD PRESSURE: 169
OHS CV CPX PERCENT MAX PREDICTED HEART RATE ACHIEVED: 97
OHS CV CPX RATE PRESSURE PRODUCT PRESENTING: 9048
RETIRED EF AND QEF - SEE NOTES: 62.91 %
RIGHT VENTRICULAR END-DIASTOLIC DIMENSION: 3.05 CM
STRESS ECHO POST EXERCISE DUR MIN: 6 MIN
STRESS ECHO POST EXERCISE DUR SEC: 0
SYSTOLIC BLOOD PRESSURE: 116
T3 SERPL-MCNC: 124 NG/DL
T4 FREE SERPL-MCNC: 1 NG/DL
TSH SERPL DL<=0.005 MIU/L-ACNC: 0.9 UIU/ML

## 2018-10-18 PROCEDURE — 84480 ASSAY TRIIODOTHYRONINE (T3): CPT

## 2018-10-18 PROCEDURE — 84439 ASSAY OF FREE THYROXINE: CPT

## 2018-10-18 PROCEDURE — 36415 COLL VENOUS BLD VENIPUNCTURE: CPT

## 2018-10-18 PROCEDURE — 83018 HEAVY METAL QUAN EACH NES: CPT

## 2018-10-18 PROCEDURE — 84432 ASSAY OF THYROGLOBULIN: CPT

## 2018-10-18 PROCEDURE — 84443 ASSAY THYROID STIM HORMONE: CPT

## 2018-10-19 LAB
IODINE SERPL-MCNC: 61 NG/ML (ref 40–92)
THRYOGLOBULIN INTERPRETATION: ABNORMAL
THYROGLOB AB SERPL-ACNC: 17 IU/ML
THYROGLOB SERPL-MCNC: 1.3 NG/ML

## 2018-10-29 ENCOUNTER — PES CALL (OUTPATIENT)
Dept: ADMINISTRATIVE | Facility: CLINIC | Age: 53
End: 2018-10-29

## 2019-02-05 ENCOUNTER — OFFICE VISIT (OUTPATIENT)
Dept: OPHTHALMOLOGY | Facility: CLINIC | Age: 54
End: 2019-02-05
Payer: COMMERCIAL

## 2019-02-05 DIAGNOSIS — H40.039 ANATOMICAL NARROW ANGLE: Primary | ICD-10-CM

## 2019-02-05 DIAGNOSIS — H47.391 MYELINATED OPTIC NERVE FIBER LAYER, RIGHT: ICD-10-CM

## 2019-02-05 DIAGNOSIS — E05.00 GRAVES DISEASE: ICD-10-CM

## 2019-02-05 DIAGNOSIS — Z83.518 FAMILY HISTORY OF MACULAR DEGENERATION: ICD-10-CM

## 2019-02-05 DIAGNOSIS — H52.7 REFRACTIVE ERROR: ICD-10-CM

## 2019-02-05 PROCEDURE — 92133 POSTERIOR SEGMENT OCT OPTIC NERVE(OCULAR COHERENCE TOMOGRAPHY) - OU - BOTH EYES: ICD-10-PCS | Mod: S$GLB,,, | Performed by: OPHTHALMOLOGY

## 2019-02-05 PROCEDURE — 92012 INTRM OPH EXAM EST PATIENT: CPT | Mod: S$GLB,,, | Performed by: OPHTHALMOLOGY

## 2019-02-05 PROCEDURE — 99999 PR PBB SHADOW E&M-EST. PATIENT-LVL III: ICD-10-PCS | Mod: PBBFAC,,, | Performed by: OPHTHALMOLOGY

## 2019-02-05 PROCEDURE — 92012 PR EYE EXAM, EST PATIENT,INTERMED: ICD-10-PCS | Mod: S$GLB,,, | Performed by: OPHTHALMOLOGY

## 2019-02-05 PROCEDURE — 92133 CPTRZD OPH DX IMG PST SGM ON: CPT | Mod: S$GLB,,, | Performed by: OPHTHALMOLOGY

## 2019-02-05 PROCEDURE — 99999 PR PBB SHADOW E&M-EST. PATIENT-LVL III: CPT | Mod: PBBFAC,,, | Performed by: OPHTHALMOLOGY

## 2019-02-05 NOTE — PROGRESS NOTES
Assessment /Plan     For exam results, see Encounter Report.    Anatomical narrow angle    Family history of macular degeneration    Graves disease    Myelinated optic nerve fiber layer, right  -     Posterior Segment OCT Optic Nerve- Both eyes    Refractive error        Open to thin TM. Repeat gonioscopy next visit. IOP and C:D remain WNL. Reviewed ACG signs and symptoms.     Mother received injections. Macula currently looks stable    No lagophthalmos. Continue lubrication with artificial tears prn. Denies diplopia, EOM's appear normal. Mitch WNL.     Appears to have myelinated nerve fibers inferior disc OD, but not previously documented. Due to Graves, baseline photos taken today 8/9/18. Pt then recalled he had photos on his phone by his friend, Dr. Adkins - baseline photos from 8/9/18 consistent with those. Baseline OCT nerve done 2/5/19.        Follow-up in about 6 months (around 8/5/2019) for IOP check, Gonio, DFE with 1% only.

## 2019-02-25 RX ORDER — ROSUVASTATIN CALCIUM 10 MG/1
TABLET, COATED ORAL
Qty: 90 TABLET | Refills: 3 | Status: SHIPPED | OUTPATIENT
Start: 2019-02-25 | End: 2020-01-20

## 2019-03-19 DIAGNOSIS — R79.89 ELEVATED FERRITIN LEVEL: ICD-10-CM

## 2019-03-19 DIAGNOSIS — K76.0 NAFLD (NONALCOHOLIC FATTY LIVER DISEASE): Primary | ICD-10-CM

## 2019-04-01 ENCOUNTER — LAB VISIT (OUTPATIENT)
Dept: LAB | Facility: HOSPITAL | Age: 54
End: 2019-04-01
Attending: INTERNAL MEDICINE
Payer: COMMERCIAL

## 2019-04-01 ENCOUNTER — OFFICE VISIT (OUTPATIENT)
Dept: ENDOCRINOLOGY | Facility: CLINIC | Age: 54
End: 2019-04-01
Payer: COMMERCIAL

## 2019-04-01 VITALS
WEIGHT: 234.88 LBS | TEMPERATURE: 98 F | SYSTOLIC BLOOD PRESSURE: 133 MMHG | HEART RATE: 75 BPM | BODY MASS INDEX: 33.63 KG/M2 | RESPIRATION RATE: 18 BRPM | DIASTOLIC BLOOD PRESSURE: 87 MMHG | HEIGHT: 70 IN

## 2019-04-01 DIAGNOSIS — E55.9 HYPOVITAMINOSIS D: ICD-10-CM

## 2019-04-01 DIAGNOSIS — E04.1 NODULAR THYROID DISEASE: ICD-10-CM

## 2019-04-01 DIAGNOSIS — K76.0 NAFLD (NONALCOHOLIC FATTY LIVER DISEASE): ICD-10-CM

## 2019-04-01 DIAGNOSIS — E78.5 HYPERLIPIDEMIA, UNSPECIFIED HYPERLIPIDEMIA TYPE: ICD-10-CM

## 2019-04-01 DIAGNOSIS — R79.89 ABNORMAL THYROID BLOOD TEST: ICD-10-CM

## 2019-04-01 DIAGNOSIS — E06.9 THYROIDITIS: ICD-10-CM

## 2019-04-01 DIAGNOSIS — E05.00 GRAVES DISEASE: ICD-10-CM

## 2019-04-01 DIAGNOSIS — E78.00 HYPERCHOLESTEROLEMIA: ICD-10-CM

## 2019-04-01 DIAGNOSIS — E05.90 HYPERTHYROIDISM: Primary | ICD-10-CM

## 2019-04-01 DIAGNOSIS — E06.3 HASHIMOTO'S DISEASE: ICD-10-CM

## 2019-04-01 DIAGNOSIS — E05.90 HYPERTHYROIDISM: ICD-10-CM

## 2019-04-01 DIAGNOSIS — Z86.39 HISTORY OF THYROTOXICOSIS: ICD-10-CM

## 2019-04-01 LAB
ALBUMIN SERPL BCP-MCNC: 4 G/DL (ref 3.5–5.2)
ALP SERPL-CCNC: 101 U/L (ref 55–135)
ALT SERPL W/O P-5'-P-CCNC: 37 U/L (ref 10–44)
ANION GAP SERPL CALC-SCNC: 8 MMOL/L (ref 8–16)
AST SERPL-CCNC: 26 U/L (ref 10–40)
BASOPHILS # BLD AUTO: 0.05 K/UL (ref 0–0.2)
BASOPHILS NFR BLD: 0.8 % (ref 0–1.9)
BILIRUB SERPL-MCNC: 1.4 MG/DL (ref 0.1–1)
BUN SERPL-MCNC: 13 MG/DL (ref 6–20)
CA-I BLDV-SCNC: 1.25 MMOL/L (ref 1.06–1.42)
CALCIUM SERPL-MCNC: 9.6 MG/DL (ref 8.7–10.5)
CHLORIDE SERPL-SCNC: 106 MMOL/L (ref 95–110)
CHOLEST SERPL-MCNC: 189 MG/DL (ref 120–199)
CHOLEST/HDLC SERPL: 4.3 {RATIO} (ref 2–5)
CO2 SERPL-SCNC: 25 MMOL/L (ref 23–29)
CREAT SERPL-MCNC: 1.1 MG/DL (ref 0.5–1.4)
DIFFERENTIAL METHOD: ABNORMAL
EOSINOPHIL # BLD AUTO: 0.1 K/UL (ref 0–0.5)
EOSINOPHIL NFR BLD: 2.1 % (ref 0–8)
ERYTHROCYTE [DISTWIDTH] IN BLOOD BY AUTOMATED COUNT: 13.3 % (ref 11.5–14.5)
EST. GFR  (AFRICAN AMERICAN): >60 ML/MIN/1.73 M^2
EST. GFR  (NON AFRICAN AMERICAN): >60 ML/MIN/1.73 M^2
ETHANOL SERPL-MCNC: <10 MG/DL
GGT SERPL-CCNC: 289 U/L (ref 8–55)
GLUCOSE SERPL-MCNC: 106 MG/DL (ref 70–110)
HCT VFR BLD AUTO: 41.6 % (ref 40–54)
HDLC SERPL-MCNC: 44 MG/DL (ref 40–75)
HDLC SERPL: 23.3 % (ref 20–50)
HGB BLD-MCNC: 14.3 G/DL (ref 14–18)
IMM GRANULOCYTES # BLD AUTO: 0.01 K/UL (ref 0–0.04)
IMM GRANULOCYTES NFR BLD AUTO: 0.2 % (ref 0–0.5)
LDLC SERPL CALC-MCNC: 110.6 MG/DL (ref 63–159)
LYMPHOCYTES # BLD AUTO: 1.6 K/UL (ref 1–4.8)
LYMPHOCYTES NFR BLD: 26.4 % (ref 18–48)
MCH RBC QN AUTO: 34.7 PG (ref 27–31)
MCHC RBC AUTO-ENTMCNC: 34.4 G/DL (ref 32–36)
MCV RBC AUTO: 101 FL (ref 82–98)
MONOCYTES # BLD AUTO: 0.6 K/UL (ref 0.3–1)
MONOCYTES NFR BLD: 9.3 % (ref 4–15)
NEUTROPHILS # BLD AUTO: 3.7 K/UL (ref 1.8–7.7)
NEUTROPHILS NFR BLD: 61.2 % (ref 38–73)
NONHDLC SERPL-MCNC: 145 MG/DL
NRBC BLD-RTO: 0 /100 WBC
PLATELET # BLD AUTO: 175 K/UL (ref 150–350)
PMV BLD AUTO: 10 FL (ref 9.2–12.9)
POTASSIUM SERPL-SCNC: 4 MMOL/L (ref 3.5–5.1)
PROT SERPL-MCNC: 7.1 G/DL (ref 6–8.4)
RBC # BLD AUTO: 4.12 M/UL (ref 4.6–6.2)
SODIUM SERPL-SCNC: 139 MMOL/L (ref 136–145)
T4 FREE SERPL-MCNC: 0.86 NG/DL (ref 0.71–1.51)
TRIGL SERPL-MCNC: 172 MG/DL (ref 30–150)
TSH SERPL DL<=0.005 MIU/L-ACNC: 1.25 UIU/ML (ref 0.4–4)
URATE SERPL-MCNC: 7.5 MG/DL (ref 3.4–7)
WBC # BLD AUTO: 6.11 K/UL (ref 3.9–12.7)

## 2019-04-01 PROCEDURE — 83520 IMMUNOASSAY QUANT NOS NONAB: CPT

## 2019-04-01 PROCEDURE — 99999 PR PBB SHADOW E&M-EST. PATIENT-LVL IV: ICD-10-PCS | Mod: PBBFAC,,, | Performed by: INTERNAL MEDICINE

## 2019-04-01 PROCEDURE — 85025 COMPLETE CBC W/AUTO DIFF WBC: CPT

## 2019-04-01 PROCEDURE — 99214 OFFICE O/P EST MOD 30 MIN: CPT | Mod: S$GLB,,, | Performed by: INTERNAL MEDICINE

## 2019-04-01 PROCEDURE — 84432 ASSAY OF THYROGLOBULIN: CPT

## 2019-04-01 PROCEDURE — 84439 ASSAY OF FREE THYROXINE: CPT

## 2019-04-01 PROCEDURE — 83018 HEAVY METAL QUAN EACH NES: CPT

## 2019-04-01 PROCEDURE — 82308 ASSAY OF CALCITONIN: CPT

## 2019-04-01 PROCEDURE — 3008F PR BODY MASS INDEX (BMI) DOCUMENTED: ICD-10-PCS | Mod: CPTII,S$GLB,, | Performed by: INTERNAL MEDICINE

## 2019-04-01 PROCEDURE — 80320 DRUG SCREEN QUANTALCOHOLS: CPT

## 2019-04-01 PROCEDURE — 83519 RIA NONANTIBODY: CPT

## 2019-04-01 PROCEDURE — 86316 IMMUNOASSAY TUMOR OTHER: CPT

## 2019-04-01 PROCEDURE — 84443 ASSAY THYROID STIM HORMONE: CPT

## 2019-04-01 PROCEDURE — 80053 COMPREHEN METABOLIC PANEL: CPT

## 2019-04-01 PROCEDURE — 36415 COLL VENOUS BLD VENIPUNCTURE: CPT | Mod: PO

## 2019-04-01 PROCEDURE — 82977 ASSAY OF GGT: CPT

## 2019-04-01 PROCEDURE — 99214 PR OFFICE/OUTPT VISIT, EST, LEVL IV, 30-39 MIN: ICD-10-PCS | Mod: S$GLB,,, | Performed by: INTERNAL MEDICINE

## 2019-04-01 PROCEDURE — 99999 PR PBB SHADOW E&M-EST. PATIENT-LVL IV: CPT | Mod: PBBFAC,,, | Performed by: INTERNAL MEDICINE

## 2019-04-01 PROCEDURE — 84550 ASSAY OF BLOOD/URIC ACID: CPT

## 2019-04-01 PROCEDURE — 84445 ASSAY OF TSI GLOBULIN: CPT

## 2019-04-01 PROCEDURE — 80061 LIPID PANEL: CPT

## 2019-04-01 PROCEDURE — 3008F BODY MASS INDEX DOCD: CPT | Mod: CPTII,S$GLB,, | Performed by: INTERNAL MEDICINE

## 2019-04-01 PROCEDURE — 82330 ASSAY OF CALCIUM: CPT

## 2019-04-01 NOTE — PROGRESS NOTES
Subjective:      Patient ID: Narciso Mckeon is a 53 y.o. male.    Chief Complaint:  Hyperthyroidism    53 yr old gentleman with post treatment Graves disease seen in [facility] today.        History of Present Illness    Patient is a 53 yr old gentleman with Graves disease  With hyperthyroidism seen in [facility] today. He has been managed with long term methimazole therapy and had previously been seen on this account with Dr Geovanni Watson and Dr Huerta.  Patient has a background epworth score of 9. Patient has polysomogram done ~ 2 yrs ago and   Showed  RLS with loud snoring but no apnea.  He continues to have intermittent neck tightness but no dysphagia nor dyspnea.  No persistent palpitations. He continues to have intermittent excessive tearing in the right eye.  Patient sees Dr Freire on this account.  Patient has been of methimazole now for ~ 2 yrs.  There is no family history of thyroid disease.   Patient was born and raised in [facility]. Patient has no history of residence outside the country.  Patients does does not contain excessive amounts of sea food, cabbage or soy products.  Patient stopped smoking in 2014 but had smoked for 20 yrs before.  Patient drinks ~ 3 beers /week.     The 10-year ASCVD risk score (Paterson DC Jr., et al., 2013) is: 3.7%    Values used to calculate the score:      Age: 52 years      Sex: Male      Is Non- : No      Diabetic: No      Tobacco smoker: No      Systolic Blood Pressure: 117 mmHg      Is BP treated: No      HDL Cholesterol: 50 mg/dL      Total Cholesterol: 189 mg/dL      Patients most recent fibroscan from 03/18 showed F0 and stage 2 steatosis.  Patient has persistent dry eyes and itching. He also has xerosis. He does have intermittent blurring of vision especially in the right eye.        Review of Systems   Constitutional: Negative for chills and diaphoresis.   HENT: Negative for facial swelling, hearing loss and trouble swallowing.    Eyes: Negative  "for photophobia and visual disturbance.   Respiratory: Negative for cough and shortness of breath.    Cardiovascular: Negative for chest pain, palpitations and leg swelling.   Gastrointestinal: Negative for nausea and vomiting.   Endocrine: Negative for polyphagia and polyuria.   Genitourinary: Negative for difficulty urinating and dysuria.   Musculoskeletal: Positive for back pain (chronic and stable). Negative for arthralgias.   Skin: Negative for color change, pallor and rash.   Neurological: Negative for headaches.   Hematological: Does not bruise/bleed easily.   Psychiatric/Behavioral: The patient is not nervous/anxious.        Objective: /87 (BP Location: Right arm, Patient Position: Sitting, BP Method: Large (Automatic))   Pulse 75   Temp 98 °F (36.7 °C)   Resp 18   Ht 5' 10" (1.778 m)   Wt 106.5 kg (234 lb 14.4 oz)   BMI 33.70 kg/m²  Body surface area is 2.29 meters squared.           Physical Exam   Constitutional: He is oriented to person, place, and time. Vital signs are normal. He appears well-developed and well-nourished.  Non-toxic appearance. No distress.   Pleasant middle aged gentleman. Not pale, anicteric and afebrile. Well hydrated and not in any acute distress.   HENT:   Head: Normocephalic and atraumatic. Head is without right periorbital erythema and without left periorbital erythema. Hair is normal.   Mouth/Throat: No oropharyngeal exudate.   Eyes: Pupils are equal, round, and reactive to light. EOM and lids are normal. Lids are everted and swept, no foreign bodies found. Right conjunctiva is not injected. Left conjunctiva is not injected. No scleral icterus.   No proptosis and no diplopia   Neck: Trachea normal, normal range of motion, full passive range of motion without pain and phonation normal. Neck supple. No JVD present. No tracheal tenderness present. Carotid bruit is not present. No tracheal deviation present. No thyroid mass and no thyromegaly present.   Cardiovascular: " Normal rate, regular rhythm, normal heart sounds and normal pulses. Exam reveals no gallop.   No murmur heard.  Pulmonary/Chest: Effort normal and breath sounds normal. No respiratory distress. He has no decreased breath sounds. He has no wheezes. He has no rales.   Abdominal: Soft. Bowel sounds are normal. He exhibits no distension and no mass. There is no hepatosplenomegaly. There is no tenderness.   Musculoskeletal: Normal range of motion. He exhibits no edema or tenderness.   No pedal edema. No digital clubbing nor thyroid acropachy. No pretibial myxedema but has fine tremor of outstretched hands   Lymphadenopathy:     He has no cervical adenopathy.   Neurological: He is alert and oriented to person, place, and time. He has normal strength and normal reflexes. He displays no tremor and normal reflexes. No cranial nerve deficit. He exhibits normal muscle tone. Gait normal.   Skin: Skin is warm, dry and intact. No bruising, no ecchymosis, no petechiae and no rash noted. He is not diaphoretic. No cyanosis or erythema. No pallor. Nails show no clubbing.   Diffuse xerosis   Psychiatric: He has a normal mood and affect. His speech is normal and behavior is normal. Judgment and thought content normal. His mood appears not anxious. Cognition and memory are normal. He does not exhibit a depressed mood.   Vitals reviewed.      Lab Review:     Results for LIZETTE REILLY (MRN [id number]) as of 4/1/2019 08:19   Ref. Range 10/15/2018 11:16 10/18/2018 09:02   Iodine, Serum Latest Ref Range: 40 - 92 ng/mL  61   TSH Latest Ref Range: 0.400 - 4.000 uIU/mL  0.900   T3, Total Latest Ref Range: 60 - 180 ng/dL  124   Free T4 Latest Ref Range: 0.71 - 1.51 ng/dL  1.00   Thyroglobulin Interpretation Unknown  SEE BELOW   Thyroglobulin Antibody Screen Latest Ref Range: <4.0 IU/mL  17 (H)   Thyroglobulin, Tumor Marker Latest Units: ng/mL  1.3 (H)   ECHOCARDIOGRAM STRESS TEST Unknown Rpt (A)    Systolic blood pressure Unknown 116     Diastolic blood pressure Unknown 85    QEF Latest Units: % 62.91    Left Ventricle Relative Wall Thickness Latest Units: cm 0.45    RPP Unknown 9,048        Assessment:     1. Hyperthyroidism  Thyroglobulin    Comprehensive metabolic panel    T4, free    TSH   2. Abnormal thyroid blood test  Thyroglobulin   3. Graves disease  US Soft Tissue Head Neck Thyroid    Thyroid stimulating immunoglobulin    Thyrotropin receptor antibody    Thyrotropin-Binding Inhibitory Immunoglobulin (TBII)    CBC auto differential   4. Hashimoto's disease  CBC auto differential   5. Thyroiditis  Thyroglobulin    CBC auto differential   6. Nodular thyroid disease  US Soft Tissue Head Neck Thyroid    Iodine, Serum    Chromogranin A    Calcitonin   7. NAFLD (nonalcoholic fatty liver disease)  Comprehensive metabolic panel    Uric acid    Gamma GT    Ethanol   8. Hyperlipidemia, unspecified hyperlipidemia type  Lipid panel    Comprehensive metabolic panel    Uric acid   9. Hypercholesterolemia  Lipid panel    Comprehensive metabolic panel    Uric acid   10. History of thyrotoxicosis     11. Hypovitaminosis D  Calcium, ionized        Regarding Graves disease; appears to be clinical quiescent at the moment as far as thyroid activity. Will recheck full TFTs and thyroid antibody profile.  Regarding thyroid nodular disease; to obtain ffup thyroid USS.  Regarding possible opthalmopathy; to continue formal opthalmic referral to evaluate for this.  Depending on the current status as regards antibody titers and presence or absence of orbitopathy may decide on permanent treatment for Graves.  His hx of NAFLD essentially exclude methimazole or PTU use as an option and his recent history of long term smoking makes radio iodine ablation a less than optimal option. Elective thyroidectomy would be the preferred management option for him and I have informed him as much but will await results of latest labs as detailed above.  Regarding NAFLD; ongoing ffup  as per hepatology group and rec as regarding ETOH intake as per hepatology team.  Regarding hyperlipidemia; to continue antilipidemic therapy as before.        Plan:       FFup in  ~ 6mths.

## 2019-04-02 LAB
THRYOGLOBULIN INTERPRETATION: ABNORMAL
THYROGLOB AB SERPL-ACNC: 23 IU/ML
THYROGLOB SERPL-MCNC: 1.5 NG/ML
TSH RECEP AB SER-ACNC: <1 IU/L (ref 0–1.75)

## 2019-04-03 LAB
CALCIT SERPL-MCNC: <5 PG/ML
IODINE SERPL-MCNC: 69 NG/ML (ref 40–92)
TSI SER-ACNC: <0.1 IU/L

## 2019-04-05 ENCOUNTER — PES CALL (OUTPATIENT)
Dept: ADMINISTRATIVE | Facility: CLINIC | Age: 54
End: 2019-04-05

## 2019-04-05 LAB — CGA SERPL-MCNC: 42 NG/ML (ref 0–95)

## 2019-04-07 LAB — TSH BII SER-ACNC: <1 % INHIBITION

## 2019-05-08 ENCOUNTER — HOSPITAL ENCOUNTER (OUTPATIENT)
Dept: RADIOLOGY | Facility: CLINIC | Age: 54
Discharge: HOME | End: 2019-05-08
Attending: INTERNAL MEDICINE
Payer: COMMERCIAL

## 2019-05-08 DIAGNOSIS — E05.00 GRAVES DISEASE: ICD-10-CM

## 2019-05-08 DIAGNOSIS — E04.1 NODULAR THYROID DISEASE: ICD-10-CM

## 2019-05-08 PROCEDURE — 76536 US SOFT TISSUE HEAD NECK THYROID: ICD-10-PCS | Mod: 26,,, | Performed by: RADIOLOGY

## 2019-05-08 PROCEDURE — 76536 US EXAM OF HEAD AND NECK: CPT | Mod: TC,PO

## 2019-05-08 PROCEDURE — 76536 US EXAM OF HEAD AND NECK: CPT | Mod: 26,,, | Performed by: RADIOLOGY

## 2019-05-15 ENCOUNTER — OFFICE VISIT (OUTPATIENT)
Dept: HEPATOLOGY | Facility: CLINIC | Age: 54
End: 2019-05-15
Payer: COMMERCIAL

## 2019-05-15 ENCOUNTER — HOSPITAL ENCOUNTER (OUTPATIENT)
Dept: RADIOLOGY | Facility: HOSPITAL | Age: 54
Discharge: HOME | End: 2019-05-15
Attending: INTERNAL MEDICINE
Payer: COMMERCIAL

## 2019-05-15 ENCOUNTER — PROCEDURE VISIT (OUTPATIENT)
Dept: HEPATOLOGY | Facility: CLINIC | Age: 54
End: 2019-05-15
Payer: COMMERCIAL

## 2019-05-15 VITALS
SYSTOLIC BLOOD PRESSURE: 156 MMHG | DIASTOLIC BLOOD PRESSURE: 85 MMHG | HEART RATE: 71 BPM | WEIGHT: 233.25 LBS | BODY MASS INDEX: 33.39 KG/M2 | OXYGEN SATURATION: 97 % | HEIGHT: 70 IN

## 2019-05-15 DIAGNOSIS — K76.0 NAFLD (NONALCOHOLIC FATTY LIVER DISEASE): ICD-10-CM

## 2019-05-15 DIAGNOSIS — K76.0 NAFLD (NONALCOHOLIC FATTY LIVER DISEASE): Primary | ICD-10-CM

## 2019-05-15 DIAGNOSIS — R79.89 ELEVATED FERRITIN LEVEL: ICD-10-CM

## 2019-05-15 PROCEDURE — 91200 LIVER ELASTOGRAPHY: CPT | Mod: S$GLB,,, | Performed by: INTERNAL MEDICINE

## 2019-05-15 PROCEDURE — 3008F BODY MASS INDEX DOCD: CPT | Mod: CPTII,S$GLB,, | Performed by: INTERNAL MEDICINE

## 2019-05-15 PROCEDURE — 99214 PR OFFICE/OUTPT VISIT, EST, LEVL IV, 30-39 MIN: ICD-10-PCS | Mod: S$GLB,,, | Performed by: INTERNAL MEDICINE

## 2019-05-15 PROCEDURE — 99999 PR PBB SHADOW E&M-EST. PATIENT-LVL IV: CPT | Mod: PBBFAC,,, | Performed by: INTERNAL MEDICINE

## 2019-05-15 PROCEDURE — 76700 US EXAM ABDOM COMPLETE: CPT | Mod: TC

## 2019-05-15 PROCEDURE — 76700 US EXAM ABDOM COMPLETE: CPT | Mod: 26,,, | Performed by: RADIOLOGY

## 2019-05-15 PROCEDURE — 99999 PR PBB SHADOW E&M-EST. PATIENT-LVL IV: ICD-10-PCS | Mod: PBBFAC,,, | Performed by: INTERNAL MEDICINE

## 2019-05-15 PROCEDURE — 76700 US ABDOMEN COMPLETE: ICD-10-PCS | Mod: 26,,, | Performed by: RADIOLOGY

## 2019-05-15 PROCEDURE — 91200 PR LIVER ELASTOGRAPHY W/OUT IMAG W/INTERP & REPORT: ICD-10-PCS | Mod: S$GLB,,, | Performed by: INTERNAL MEDICINE

## 2019-05-15 PROCEDURE — 3008F PR BODY MASS INDEX (BMI) DOCUMENTED: ICD-10-PCS | Mod: CPTII,S$GLB,, | Performed by: INTERNAL MEDICINE

## 2019-05-15 PROCEDURE — 99214 OFFICE O/P EST MOD 30 MIN: CPT | Mod: S$GLB,,, | Performed by: INTERNAL MEDICINE

## 2019-05-15 NOTE — PROGRESS NOTES
Subjective:       Patient ID: Narciso Mckeon is a 53 y.o. male.    Chief Complaint: Abnormal LFTs    HPI  I saw this 53 y.o. man back in the hepatology clinic for follow up.  He was last seen in March 2018.    - raised GGTand ALP.  - likely alcohol related but also overweight  - has gained weight since I last saw him about 1 year ago.  - diagnosed with hypothyroidism    Last firboscan was in March 2018  - F0/S2    Body mass index is 33.47 kg/m².    Well  Admission to hospital with severe infection (?ear) and high ferritin (2636) which has since dropped to 546.    HFE gene negative.  Alk Phos normal    Abdo US: 5/15/2019  1. Hepatic steatosis.  2. Adenomyomatosis.    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   HENT: Negative for hearing loss.    Eyes: Negative for discharge and visual disturbance.   Respiratory: Negative for cough, chest tightness, shortness of breath and wheezing.    Cardiovascular: Negative for chest pain, palpitations and leg swelling.   Gastrointestinal: Negative for abdominal distention, abdominal pain, constipation, diarrhea and nausea.   Genitourinary: Negative for dysuria and frequency.   Musculoskeletal: Negative for arthralgias and back pain.   Skin: Negative for pallor and rash.   Neurological: Negative for dizziness, tremors, speech difficulty and headaches.   Hematological: Negative for adenopathy.   Psychiatric/Behavioral: Negative for agitation and confusion.           Lab Results   Component Value Date    ALT 25 05/15/2019    AST 20  05/15/2019     (H) 04/01/2019    ALKPHOS 97 05/15/2019    BILITOT 0.7 05/15/2019     Past Medical History:   Diagnosis Date    Arthritis     Chronic neck and back pain     DDD (degenerative disc disease), cervical     Graves disease     Hyperthyroidism 11/18/2015     Past Surgical History:   Procedure Laterality Date    COLONOSCOPY N/A 9/5/2018    [facility]ll[facility]    FINGER SURGERY      removal of steel    KNEE SURGERY Left 01/2015    repair     Current Outpatient Medications   Medication Sig    ASPIR-LOW 81 mg EC tablet TAKE ONE TABLET BY MOUTH once a day    b complex vitamins tablet Take 1 tablet by mouth once daily.    betamethasone dipropionate (DIPROLENE) 0.05 % ointment AAA hand BID PRN flare    clobetasol (TEMOVATE) 0.05 % cream Thin film to AA hands BID PRN flare    gabapentin (NEURONTIN) 300 MG capsule Take 1 capsule (300 mg total) by mouth 3 (three) times daily.    meclizine (ANTIVERT) 25 mg tablet Take 1 tablet (25 mg total) by mouth 3 (three) times daily as needed.    meloxicam (MOBIC) 15 MG tablet Take 15 mg by mouth once daily.    morphine (MS CONTIN) 15 MG 12 hr tablet Take 15 mg by mouth as needed.    multivitamin capsule Take 1 capsule by mouth once daily.    oxyCODONE (ROXICODONE) 15 MG Tab Take 15 mg by mouth daily as needed.    rosuvastatin (CRESTOR) 10 MG tablet TAKE ONE TABLET BY MOUTH once a day    tizanidine (ZANAFLEX) 4 MG tablet Take 2 mg by mouth every 8 (eight) hours.     clonazePAM (KLONOPIN) 0.5 MG tablet Take 1 tablet (0.5 mg total) by mouth nightly as needed for Anxiety.     No current facility-administered medications for this visit.        Objective:      Physical Exam   Constitutional: He is oriented to person, place, and time. He appears well-nourished.   HENT:   Head: Normocephalic.   Eyes: Pupils are equal, round, and reactive to light.   Neck: No thyromegaly present.   Cardiovascular: Normal rate, regular rhythm and  normal heart sounds.   Pulmonary/Chest: Effort normal and breath sounds normal. He has no wheezes.   Abdominal: Soft. He exhibits no distension and no mass. There is no tenderness.   Lymphadenopathy:     He has no cervical adenopathy.   Neurological: He is alert and oriented to person, place, and time.   Skin: Skin is warm. No rash noted. No erythema.   Psychiatric: He has a normal mood and affect. His behavior is normal.       Assessment:       1. NAFLD (nonalcoholic fatty liver disease)        Plan:   Overweight but no clear evidence of advanced liver fibrosis.  - presumed NAFLD    - reassured that he does not have any significant liver disease  - fibroscan in March 2018 shows F0 but stage 2 steatosis.  - no change today- May 2019- F0, S2    - ?element of alcohol overuse- admits to some     1) Weight loss  2) Discussed reducing carbs + increasing protein intake  3) Clinic in 1 year with fibroscan

## 2019-05-15 NOTE — PROCEDURES
Procedures   Fibroscan Procedure     Name: Narciso Mckeon  Date of Procedure : 05/15/2019   :: Narciso Pickens MD  Diagnosis: NAFLD    Probe: XL    Fibroscan readin KPa    Fibrosis:F 0-1     CAP readin dB/m    Steatosis: :S2

## 2019-06-01 RX ORDER — ASPIRIN 81 MG/1
TABLET ORAL
Qty: 90 TABLET | Refills: 3 | Status: SHIPPED | OUTPATIENT
Start: 2019-06-01 | End: 2021-01-20

## 2019-07-07 ENCOUNTER — PATIENT MESSAGE (OUTPATIENT)
Dept: INTERNAL MEDICINE | Facility: CLINIC | Age: 54
End: 2019-07-07

## 2019-07-07 DIAGNOSIS — L08.9 TOE INFECTION: Primary | ICD-10-CM

## 2019-07-08 NOTE — TELEPHONE ENCOUNTER
Can you see if he can be scheduled w/ podiatry? If  Nothing available today, then MINO appt please.

## 2019-07-11 ENCOUNTER — TELEPHONE (OUTPATIENT)
Dept: PODIATRY | Facility: CLINIC | Age: 54
End: 2019-07-11

## 2019-07-11 NOTE — TELEPHONE ENCOUNTER
Spoke with pt re referral--he is currently in [facility] and went to local urgent care there.  He feels his foot is much improved and does not need appt at this time.  Contact number for [place] clinic per request for future needs

## 2019-08-06 ENCOUNTER — OFFICE VISIT (OUTPATIENT)
Dept: OPHTHALMOLOGY | Facility: CLINIC | Age: 54
End: 2019-08-06
Payer: COMMERCIAL

## 2019-08-06 DIAGNOSIS — H40.039 ANATOMICAL NARROW ANGLE: Primary | ICD-10-CM

## 2019-08-06 DIAGNOSIS — E05.00 GRAVES DISEASE: ICD-10-CM

## 2019-08-06 DIAGNOSIS — Z83.518 FAMILY HISTORY OF MACULAR DEGENERATION: ICD-10-CM

## 2019-08-06 DIAGNOSIS — H52.7 REFRACTIVE ERROR: ICD-10-CM

## 2019-08-06 DIAGNOSIS — H47.391 MYELINATED OPTIC NERVE FIBER LAYER, RIGHT: ICD-10-CM

## 2019-08-06 PROCEDURE — 92014 PR EYE EXAM, EST PATIENT,COMPREHESV: ICD-10-PCS | Mod: S$GLB,,, | Performed by: OPHTHALMOLOGY

## 2019-08-06 PROCEDURE — 99999 PR PBB SHADOW E&M-EST. PATIENT-LVL III: ICD-10-PCS | Mod: PBBFAC,,, | Performed by: OPHTHALMOLOGY

## 2019-08-06 PROCEDURE — 92014 COMPRE OPH EXAM EST PT 1/>: CPT | Mod: S$GLB,,, | Performed by: OPHTHALMOLOGY

## 2019-08-06 PROCEDURE — 92020 PR SPECIAL EYE EVAL,GONIOSCOPY: ICD-10-PCS | Mod: S$GLB,,, | Performed by: OPHTHALMOLOGY

## 2019-08-06 PROCEDURE — 99999 PR PBB SHADOW E&M-EST. PATIENT-LVL III: CPT | Mod: PBBFAC,,, | Performed by: OPHTHALMOLOGY

## 2019-08-06 PROCEDURE — 92020 GONIOSCOPY: CPT | Mod: S$GLB,,, | Performed by: OPHTHALMOLOGY

## 2019-08-06 NOTE — PROGRESS NOTES
HPI     52 YO male presents today for an IOP check and gonio w/ DFE. He states he   is doing well, notes headache today around OD.     OTC tears OU PRN     Agree with above. Denies pain of eye itself, headache was coming on but   not at the moment.    Last edited by Giuliana Story MD on 8/6/2019  9:45 AM.   (History)        ROS     Positive for: Musculoskeletal (gabapentin et al for back pain), Endocrine   (Graves/Hashimoto's)    Negative for: Cardiovascular (denies HTN), Eyes (denies surgery/trauma;   except for burns from welding), Respiratory (denies asthma)    Last edited by Giuliana Story MD on 8/6/2019 10:23 AM.   (History)        Assessment /Plan     For exam results, see Encounter Report.    Anatomical narrow angle    Family history of macular degeneration    Graves disease    Myelinated optic nerve fiber layer, right    Refractive error          Open to thin TM. Repeat gonioscopy next visit. IOP and C:D remain WNL. Reviewed ACG signs and symptoms.     Mother received injections. Macula currently looks stable    No lagophthalmos. Continue lubrication with artificial tears prn. Denies diplopia, EOM's appear normal. Ishihara WNL.     Appears to have myelinated nerve fibers inferior disc OD, but not previously documented. Due to Graves, baseline photos taken today 8/9/18. Pt then recalled he had photos on his phone by his friend, Dr. Adkins - baseline photos from 8/9/18 consistent with those. Baseline OCT nerve done 2/5/19.      Follow up in about 1 year (around 8/6/2020) for Gonio, IOP check, DFE with 1% only, OCT optic nerve, MRx.

## 2019-09-12 DIAGNOSIS — M25.562 PAIN IN BOTH KNEES, UNSPECIFIED CHRONICITY: Primary | ICD-10-CM

## 2019-09-12 DIAGNOSIS — M25.561 PAIN IN BOTH KNEES, UNSPECIFIED CHRONICITY: Primary | ICD-10-CM

## 2019-09-16 ENCOUNTER — HOSPITAL ENCOUNTER (OUTPATIENT)
Dept: RADIOLOGY | Facility: HOSPITAL | Age: 54
Discharge: HOME | End: 2019-09-16
Attending: ORTHOPAEDIC SURGERY
Payer: COMMERCIAL

## 2019-09-16 ENCOUNTER — OFFICE VISIT (OUTPATIENT)
Dept: ORTHOPEDICS | Facility: CLINIC | Age: 54
End: 2019-09-16
Payer: COMMERCIAL

## 2019-09-16 VITALS
BODY MASS INDEX: 33.36 KG/M2 | WEIGHT: 233 LBS | DIASTOLIC BLOOD PRESSURE: 87 MMHG | HEIGHT: 70 IN | HEART RATE: 74 BPM | SYSTOLIC BLOOD PRESSURE: 169 MMHG

## 2019-09-16 DIAGNOSIS — M17.10 ARTHRITIS OF KNEE: Primary | ICD-10-CM

## 2019-09-16 DIAGNOSIS — M25.562 PAIN IN BOTH KNEES, UNSPECIFIED CHRONICITY: ICD-10-CM

## 2019-09-16 DIAGNOSIS — M25.561 PAIN IN BOTH KNEES, UNSPECIFIED CHRONICITY: ICD-10-CM

## 2019-09-16 PROCEDURE — 3008F BODY MASS INDEX DOCD: CPT | Mod: CPTII,S$GLB,, | Performed by: ORTHOPAEDIC SURGERY

## 2019-09-16 PROCEDURE — 73564 X-RAY EXAM KNEE 4 OR MORE: CPT | Mod: TC,50,PN

## 2019-09-16 PROCEDURE — 3008F PR BODY MASS INDEX (BMI) DOCUMENTED: ICD-10-PCS | Mod: CPTII,S$GLB,, | Performed by: ORTHOPAEDIC SURGERY

## 2019-09-16 PROCEDURE — 73564 X-RAY EXAM KNEE 4 OR MORE: CPT | Mod: 26,50,, | Performed by: RADIOLOGY

## 2019-09-16 PROCEDURE — 99999 PR PBB SHADOW E&M-EST. PATIENT-LVL III: CPT | Mod: PBBFAC,,, | Performed by: ORTHOPAEDIC SURGERY

## 2019-09-16 PROCEDURE — 73564 XR KNEE ORTHO BILAT WITH FLEXION: ICD-10-PCS | Mod: 26,50,, | Performed by: RADIOLOGY

## 2019-09-16 PROCEDURE — 99213 OFFICE O/P EST LOW 20 MIN: CPT | Mod: S$GLB,,, | Performed by: ORTHOPAEDIC SURGERY

## 2019-09-16 PROCEDURE — 99999 PR PBB SHADOW E&M-EST. PATIENT-LVL III: ICD-10-PCS | Mod: PBBFAC,,, | Performed by: ORTHOPAEDIC SURGERY

## 2019-09-16 PROCEDURE — 99213 PR OFFICE/OUTPT VISIT, EST, LEVL III, 20-29 MIN: ICD-10-PCS | Mod: S$GLB,,, | Performed by: ORTHOPAEDIC SURGERY

## 2019-09-16 NOTE — PROGRESS NOTES
Past Medical History:   Diagnosis Date    Arthritis     Chronic neck and back pain     DDD (degenerative disc disease), cervical     Graves disease     Hyperthyroidism 11/18/2015       Past Surgical History:   Procedure Laterality Date    COLONOSCOPY N/A 9/5/2018    Performed by Keith Arellano MD at [facility] ENDO    FINGER SURGERY      removal of steel    KNEE SURGERY Left 01/2015    repair       Current Outpatient Medications   Medication Sig    aspirin (ECOTRIN) 81 MG EC tablet TAKE ONE TABLET BY MOUTH ONCE DAILY    b complex vitamins tablet Take 1 tablet by mouth once daily.    betamethasone dipropionate (DIPROLENE) 0.05 % ointment AAA hand BID PRN flare    clobetasol (TEMOVATE) 0.05 % cream Thin film to AA hands BID PRN flare    gabapentin (NEURONTIN) 300 MG capsule Take 1 capsule (300 mg total) by mouth 3 (three) times daily.    meclizine (ANTIVERT) 25 mg tablet Take 1 tablet (25 mg total) by mouth 3 (three) times daily as needed.    meloxicam (MOBIC) 15 MG tablet Take 15 mg by mouth once daily.    morphine (MS CONTIN) 15 MG 12 hr tablet Take 15 mg by mouth as needed.    multivitamin capsule Take 1 capsule by mouth once daily.    oxyCODONE (ROXICODONE) 15 MG Tab Take 15 mg by mouth daily as needed.    rosuvastatin (CRESTOR) 10 MG tablet TAKE ONE TABLET BY MOUTH once a day    tizanidine (ZANAFLEX) 4 MG tablet Take 2 mg by mouth every 8 (eight) hours.     clonazePAM (KLONOPIN) 0.5 MG tablet Take 1 tablet (0.5 mg total) by mouth nightly as needed for Anxiety.     No current facility-administered medications for this visit.        Review of patient's allergies indicates:   Allergen Reactions    Bactrim [sulfamethoxazole-trimethoprim]      Possibly allergic reaction       Family History   Problem Relation Age of Onset    Heart disease Father 52        MI    Macular degeneration Mother     Melanoma Mother     Lupus Neg Hx     Eczema Neg Hx     Psoriasis Neg Hx     Glaucoma Neg Hx      Retinal detachment Neg Hx        Social History     Socioeconomic History    Marital status:      Spouse name: Not on file    Number of children: Not on file    Years of education: Not on file    Highest education level: Not on file   Occupational History    Occupation: disabled   Social Needs    Financial resource strain: Not on file    Food insecurity:     Worry: Not on file     Inability: Not on file    Transportation needs:     Medical: Not on file     Non-medical: Not on file   Tobacco Use    Smoking status: Former Smoker     Last attempt to quit: 2014     Years since quittin.8    Smokeless tobacco: Never Used   Substance and Sexual Activity    Alcohol use: Yes     Comment: occas    Drug use: No    Sexual activity: Not on file   Lifestyle    Physical activity:     Days per week: Not on file     Minutes per session: Not on file    Stress: Not on file   Relationships    Social connections:     Talks on phone: Not on file     Gets together: Not on file     Attends Jew service: Not on file     Active member of club or organization: Not on file     Attends meetings of clubs or organizations: Not on file     Relationship status: Not on file   Other Topics Concern    Not on file   Social History Narrative    Not on file       Chief Complaint:   Chief Complaint   Patient presents with    Knee Pain     bilateral knee pain       History of present illness: This is a 53-year-old male seen for bilateral knee pain.  Patient states that he was told that he had arthritis in his knees. Has a history of a left meniscus surgery back in .  Patient has lot of popping and cracking.  Feels like his knees are going to lock up on him.  Left knee is the worst.  Been getting worse over the last few months.  No new injury or trauma.  Pain is a 5/10.      Review of Systems:    Constitution: Negative for chills, fever, and sweats.  Negative for unexplained weight loss.    HENT:  Negative for  headaches and blurry vision.    Cardiovascular:Negative for chest pain or irregular heart beat. Negative for hypertension.    Respiratory:  Negative for cough and shortness of breath.    Gastrointestinal: Negative for abdominal pain, heartburn, melena, nausea, and vomitting.    Genitourinary:  Negative bladder incontinence and dysuria.    Musculoskeletal:  See HPI    Neurological: Negative for numbness.    Psychiatric/Behavioral: Negative for depression.  The patient is not nervous/anxious.      Endocrine: Negative for polyuria    Hematologic/Lymphatic: Negative for bleeding problem.  Does not bruise/bleed easily.    Skin: Negative for poor would healing and rash      Physical Examination:    Vital Signs:    Vitals:    09/16/19 1547   BP: (!) 169/87   Pulse: 74       Body mass index is 33.43 kg/m².    This a well-developed, well nourished patient in no acute distress.  They are alert and oriented and cooperative to examination.  Pt. walks without an antalgic gait.      Examination of bilateral knees shows no rashes or erythema. There are no masses ecchymosis or effusion. Patient has full range of motion from 0-130°. Patient is nontender to palpation over lateral joint line and nontender to palpation over the medial joint line. Patient has a - Lachman exam, - anterior drawer exam, and - posterior drawer exam. - Ruddy's exam. Knee is stable to varus and valgus stress. 5 out of 5 motor strength. Palpable distal pulses. Intact light touch sensation.  Moderate Patellofemoral crepitus      X-rays: X-rays of both knees are ordered and reviewed which show very mild arthritic changes.  Very mild medial narrowing on the left and some patellofemoral arthritic changes     Assessment:  Bilateral patellofemoral arthritis    Plan:  I reviewed the findings with him today.  Patient has mild arthritis particularly of the patellofemoral joint.  We talked about treatment options.  He has tried anti-inflammatories without success.  I  recommended a hyaluronic acid series.  We will get authorization for Synvisc-One injections.        This note was created using voice recognition software that occasionally misinterpreted phrases or words.    Consult note is delivered via Epic messaging service.

## 2019-09-17 DIAGNOSIS — M17.0 PRIMARY OSTEOARTHRITIS OF BOTH KNEES: Primary | ICD-10-CM

## 2019-09-20 ENCOUNTER — LAB VISIT (OUTPATIENT)
Dept: LAB | Facility: HOSPITAL | Age: 54
End: 2019-09-20
Attending: INTERNAL MEDICINE
Payer: COMMERCIAL

## 2019-09-20 ENCOUNTER — OFFICE VISIT (OUTPATIENT)
Dept: ENDOCRINOLOGY | Facility: CLINIC | Age: 54
End: 2019-09-20
Payer: COMMERCIAL

## 2019-09-20 VITALS
TEMPERATURE: 98 F | SYSTOLIC BLOOD PRESSURE: 130 MMHG | HEIGHT: 70 IN | HEART RATE: 66 BPM | BODY MASS INDEX: 34.1 KG/M2 | WEIGHT: 238.19 LBS | DIASTOLIC BLOOD PRESSURE: 90 MMHG

## 2019-09-20 DIAGNOSIS — E78.00 HYPERCHOLESTEROLEMIA: ICD-10-CM

## 2019-09-20 DIAGNOSIS — E06.9 THYROIDITIS: ICD-10-CM

## 2019-09-20 DIAGNOSIS — E88.810 DYSMETABOLIC SYNDROME: ICD-10-CM

## 2019-09-20 DIAGNOSIS — Z86.39 HISTORY OF THYROTOXICOSIS: ICD-10-CM

## 2019-09-20 DIAGNOSIS — E78.5 HYPERLIPIDEMIA, UNSPECIFIED HYPERLIPIDEMIA TYPE: ICD-10-CM

## 2019-09-20 DIAGNOSIS — E55.9 HYPOVITAMINOSIS D: ICD-10-CM

## 2019-09-20 DIAGNOSIS — K76.0 NAFLD (NONALCOHOLIC FATTY LIVER DISEASE): ICD-10-CM

## 2019-09-20 DIAGNOSIS — E07.9 THYROID DISEASE: ICD-10-CM

## 2019-09-20 DIAGNOSIS — E06.3 HASHIMOTO'S DISEASE: ICD-10-CM

## 2019-09-20 DIAGNOSIS — E04.1 NODULAR THYROID DISEASE: ICD-10-CM

## 2019-09-20 DIAGNOSIS — N40.0 BENIGN PROSTATIC HYPERPLASIA, UNSPECIFIED WHETHER LOWER URINARY TRACT SYMPTOMS PRESENT: ICD-10-CM

## 2019-09-20 DIAGNOSIS — R79.89 ABNORMAL THYROID BLOOD TEST: ICD-10-CM

## 2019-09-20 DIAGNOSIS — Z86.39 HISTORY OF THYROTOXICOSIS: Primary | ICD-10-CM

## 2019-09-20 DIAGNOSIS — E05.00 GRAVES DISEASE: ICD-10-CM

## 2019-09-20 LAB
25(OH)D3+25(OH)D2 SERPL-MCNC: 39 NG/ML (ref 30–96)
ALBUMIN SERPL BCP-MCNC: 4.2 G/DL (ref 3.5–5.2)
ALP SERPL-CCNC: 94 U/L (ref 55–135)
ALT SERPL W/O P-5'-P-CCNC: 42 U/L (ref 10–44)
ANION GAP SERPL CALC-SCNC: 7 MMOL/L (ref 8–16)
AST SERPL-CCNC: 26 U/L (ref 10–40)
BILIRUB SERPL-MCNC: 0.9 MG/DL (ref 0.1–1)
BUN SERPL-MCNC: 20 MG/DL (ref 6–20)
CA-I BLDV-SCNC: 1.25 MMOL/L (ref 1.06–1.42)
CALCIUM SERPL-MCNC: 9.3 MG/DL (ref 8.7–10.5)
CHLORIDE SERPL-SCNC: 106 MMOL/L (ref 95–110)
CO2 SERPL-SCNC: 27 MMOL/L (ref 23–29)
CREAT SERPL-MCNC: 1.1 MG/DL (ref 0.5–1.4)
EST. GFR  (AFRICAN AMERICAN): >60 ML/MIN/1.73 M^2
EST. GFR  (NON AFRICAN AMERICAN): >60 ML/MIN/1.73 M^2
ESTIMATED AVG GLUCOSE: 88 MG/DL (ref 68–131)
GGT SERPL-CCNC: 234 U/L (ref 8–55)
GLUCOSE SERPL-MCNC: 106 MG/DL (ref 70–110)
HBA1C MFR BLD HPLC: 4.7 % (ref 4–5.6)
POTASSIUM SERPL-SCNC: 4.3 MMOL/L (ref 3.5–5.1)
PROSTATE SPECIFIC ANTIGEN, TOTAL: 2 NG/ML (ref 0–4)
PROT SERPL-MCNC: 7.3 G/DL (ref 6–8.4)
PSA FREE MFR SERPL: 16.5 %
PSA FREE SERPL-MCNC: 0.33 NG/ML (ref 0.01–1.5)
PTH-INTACT SERPL-MCNC: 26 PG/ML (ref 9–77)
SODIUM SERPL-SCNC: 140 MMOL/L (ref 136–145)
T3 SERPL-MCNC: 114 NG/DL (ref 60–180)
T4 FREE SERPL-MCNC: 0.91 NG/DL (ref 0.71–1.51)
TSH SERPL DL<=0.005 MIU/L-ACNC: 1.6 UIU/ML (ref 0.4–4)
URATE SERPL-MCNC: 7.1 MG/DL (ref 3.4–7)

## 2019-09-20 PROCEDURE — 99214 OFFICE O/P EST MOD 30 MIN: CPT | Mod: S$GLB,,, | Performed by: INTERNAL MEDICINE

## 2019-09-20 PROCEDURE — 82977 ASSAY OF GGT: CPT

## 2019-09-20 PROCEDURE — 99999 PR PBB SHADOW E&M-EST. PATIENT-LVL III: CPT | Mod: PBBFAC,,, | Performed by: INTERNAL MEDICINE

## 2019-09-20 PROCEDURE — 84439 ASSAY OF FREE THYROXINE: CPT

## 2019-09-20 PROCEDURE — 84432 ASSAY OF THYROGLOBULIN: CPT

## 2019-09-20 PROCEDURE — 99214 PR OFFICE/OUTPT VISIT, EST, LEVL IV, 30-39 MIN: ICD-10-PCS | Mod: S$GLB,,, | Performed by: INTERNAL MEDICINE

## 2019-09-20 PROCEDURE — 84550 ASSAY OF BLOOD/URIC ACID: CPT

## 2019-09-20 PROCEDURE — 83036 HEMOGLOBIN GLYCOSYLATED A1C: CPT

## 2019-09-20 PROCEDURE — 80053 COMPREHEN METABOLIC PANEL: CPT

## 2019-09-20 PROCEDURE — 3008F BODY MASS INDEX DOCD: CPT | Mod: CPTII,S$GLB,, | Performed by: INTERNAL MEDICINE

## 2019-09-20 PROCEDURE — 82330 ASSAY OF CALCIUM: CPT

## 2019-09-20 PROCEDURE — 84154 ASSAY OF PSA FREE: CPT

## 2019-09-20 PROCEDURE — 84480 ASSAY TRIIODOTHYRONINE (T3): CPT

## 2019-09-20 PROCEDURE — 83970 ASSAY OF PARATHORMONE: CPT

## 2019-09-20 PROCEDURE — 82306 VITAMIN D 25 HYDROXY: CPT

## 2019-09-20 PROCEDURE — 3008F PR BODY MASS INDEX (BMI) DOCUMENTED: ICD-10-PCS | Mod: CPTII,S$GLB,, | Performed by: INTERNAL MEDICINE

## 2019-09-20 PROCEDURE — 84443 ASSAY THYROID STIM HORMONE: CPT

## 2019-09-20 PROCEDURE — 99999 PR PBB SHADOW E&M-EST. PATIENT-LVL III: ICD-10-PCS | Mod: PBBFAC,,, | Performed by: INTERNAL MEDICINE

## 2019-09-20 NOTE — PROGRESS NOTES
Subjective:      Patient ID: Narciso Mckeon is a 53 y.o. male.    Chief Complaint:        53 yr old gentleman with post treatment Graves disease seen in [facility] today    History of Present Illness    Patient is a 53 yr old gentleman with history of Graves disease with hyperthyroidism seen in [facility] today. He had been managed with long term methimazole therapy and had previously been seen on this account with Dr Geovanni Watson and Dr Huerta. He is presently of this medication though and his last TFTs confirmed biochemical euthyroid state.  His most recent thyroid USS from 05/19 showed stable thyroid nodular disease presently note at the threshold for biopsy.  His next thyroid USS should be for ~ 05/20.  Patient has a background epworth score of 9. Patient has polysomogram done ~ 2 yrs ago and   Showed  RLS with loud snoring but no apnea.  He continues to have intermittent neck tightness but no dysphagia nor dyspnea.  No persistent palpitations. He continues to have intermittent excessive tearing in the right eye.  Patient sees Dr Freire on this account.  Patient has been of methimazole now for ~ 2 yrs.  There is no family history of thyroid disease.   Patient was born and raised in [facility]. Patient has no history of residence outside the country.  Patients does does not contain excessive amounts of sea food, cabbage or soy products.  Patient stopped smoking in 2014 but had smoked for 20 yrs before.  Patient drinks ~ 3 beers /week.     The 10-year ASCVD risk score (Joyce JUANITA Jr., et al., 2013) is: 3.7%    Values used to calculate the score:      Age: 52 years      Sex: Male      Is Non- : No      Diabetic: No      Tobacco smoker: No      Systolic Blood Pressure: 117 mmHg      Is BP treated: No      HDL Cholesterol: 50 mg/dL      Total Cholesterol: 189 mg/dL      Patients most recent fibroscan from 03/18 showed F0 and stage 2 hepatic steatosis.  Patient has persistent dry eyes and itching. He  "also has xerosis. He does have intermittent blurring of vision especially in the right eye.       Review of Systems   Constitutional: Negative for chills and diaphoresis.   HENT: Negative for facial swelling, hearing loss and trouble swallowing.    Eyes: Negative for photophobia and visual disturbance.   Respiratory: Negative for cough and shortness of breath.    Cardiovascular: Negative for chest pain, palpitations and leg swelling.   Gastrointestinal: Negative for nausea and vomiting.   Endocrine: Negative for polyphagia and polyuria.   Genitourinary: Negative for difficulty urinating and dysuria.   Musculoskeletal: Positive for back pain (chronic and stable). Negative for arthralgias.   Skin: Negative for color change, pallor and rash.   Neurological: Negative for headaches.   Hematological: Does not bruise/bleed easily.   Psychiatric/Behavioral: Negative for confusion. The patient is not nervous/anxious.        Objective: BP (!) 130/90 (BP Location: Left arm, Patient Position: Sitting, BP Method: Large (Manual))   Pulse 66   Temp 97.8 °F (36.6 °C) (Oral)   Ht 5' 10" (1.778 m)   Wt 108 kg (238 lb 3.3 oz)   BMI 34.18 kg/m²  Body surface area is 2.31 meters squared.           Physical Exam   Constitutional: He is oriented to person, place, and time. Vital signs are normal. He appears well-developed and well-nourished.  Non-toxic appearance. No distress.   Pleasant middle aged gentleman. Not pale, anicteric and afebrile. Well hydrated and not in any acute distress.   HENT:   Head: Normocephalic and atraumatic. Head is without right periorbital erythema and without left periorbital erythema. Hair is normal.   Mouth/Throat: No oropharyngeal exudate.   Eyes: Pupils are equal, round, and reactive to light. EOM and lids are normal. Lids are everted and swept, no foreign bodies found. Right conjunctiva is not injected. Left conjunctiva is not injected. No scleral icterus.   No proptosis and no diplopia   Neck: " Trachea normal, normal range of motion, full passive range of motion without pain and phonation normal. Neck supple. No JVD present. No tracheal tenderness present. Carotid bruit is not present. No tracheal deviation present. No thyroid mass and no thyromegaly present.   Cardiovascular: Normal rate, regular rhythm, normal heart sounds and normal pulses. Exam reveals no gallop.   No murmur heard.  Pulmonary/Chest: Effort normal and breath sounds normal. No respiratory distress. He has no decreased breath sounds. He has no wheezes. He has no rales.   Abdominal: Soft. Bowel sounds are normal. He exhibits no distension and no mass. There is no hepatosplenomegaly. There is no tenderness.   Musculoskeletal: Normal range of motion. He exhibits no edema or tenderness.   No pedal edema. No digital clubbing nor thyroid acropachy. No pretibial myxedema but has fine tremor of outstretched hands   Lymphadenopathy:     He has no cervical adenopathy.   Neurological: He is alert and oriented to person, place, and time. He has normal strength and normal reflexes. He displays no tremor and normal reflexes. No cranial nerve deficit. He exhibits normal muscle tone. Gait normal.   Skin: Skin is warm, dry and intact. No bruising, no ecchymosis, no petechiae and no rash noted. He is not diaphoretic. No cyanosis or erythema. No pallor. Nails show no clubbing.   Diffuse xerosis   Psychiatric: He has a normal mood and affect. His speech is normal and behavior is normal. Judgment and thought content normal. His mood appears not anxious. Cognition and memory are normal. He does not exhibit a depressed mood.   Vitals reviewed.      Lab Review:     Results for LIZETTE REILLY (MRN [id number]) as of 9/20/2019 08:18   Ref. Range 5/15/2019 10:00 9/16/2019 16:05   WBC Latest Ref Range: 3.90 - 12.70 K/uL 5.47    RBC Latest Ref Range: 4.60 - 6.20 M/uL 4.21 (L)    Hemoglobin Latest Ref Range: 14.0 - 18.0 g/dL 14.8    Hematocrit Latest Ref Range:  40.0 - 54.0 % 42.5    MCV Latest Ref Range: 82 - 98 fL 101 (H)    MCH Latest Ref Range: 27.0 - 31.0 pg 35.2 (H)    MCHC Latest Ref Range: 32.0 - 36.0 g/dL 34.8    RDW Latest Ref Range: 11.5 - 14.5 % 13.0    Platelets Latest Ref Range: 150 - 350 K/uL 214    MPV Latest Ref Range: 9.2 - 12.9 fL 9.4    Gran% Latest Ref Range: 38.0 - 73.0 % 60.5    Gran # (ANC) Latest Ref Range: 1.8 - 7.7 K/uL 3.3    Lymph% Latest Ref Range: 18.0 - 48.0 % 28.2    Lymph # Latest Ref Range: 1.0 - 4.8 K/uL 1.5    Mono% Latest Ref Range: 4.0 - 15.0 % 8.4    Mono # Latest Ref Range: 0.3 - 1.0 K/uL 0.5    Eosinophil% Latest Ref Range: 0.0 - 8.0 % 2.2    Eos # Latest Ref Range: 0.0 - 0.5 K/uL 0.1    Basophil% Latest Ref Range: 0.0 - 1.9 % 0.5    Baso # Latest Ref Range: 0.00 - 0.20 K/uL 0.03    nRBC Latest Ref Range: 0 /100 WBC 0    Differential Method Unknown Automated    Immature Grans (Abs) Latest Ref Range: 0.00 - 0.04 K/uL 0.01    Immature Granulocytes Latest Ref Range: 0.0 - 0.5 % 0.2    Ferritin Latest Ref Range: 20.0 - 300.0 ng/mL 347 (H)    Protime Latest Ref Range: 9.0 - 12.5 sec 10.5    Coumadin Monitoring INR Latest Ref Range: 0.8 - 1.2  1.0    Sodium Latest Ref Range: 136 - 145 mmol/L 140    Potassium Latest Ref Range: 3.5 - 5.1 mmol/L 4.3    Chloride Latest Ref Range: 95 - 110 mmol/L 109    CO2 Latest Ref Range: 23 - 29 mmol/L 24    Anion Gap Latest Ref Range: 8 - 16 mmol/L 7 (L)    BUN, Bld Latest Ref Range: 6 - 20 mg/dL 16    Creatinine Latest Ref Range: 0.5 - 1.4 mg/dL 1.0    eGFR if non African American Latest Ref Range: >60 mL/min/1.73 m^2 >60.0    eGFR if African American Latest Ref Range: >60 mL/min/1.73 m^2 >60.0    Glucose Latest Ref Range: 70 - 110 mg/dL 113 (H)    Calcium Latest Ref Range: 8.7 - 10.5 mg/dL 9.2    Alkaline Phosphatase Latest Ref Range: 55 - 135 U/L 97    PROTEIN TOTAL Latest Ref Range: 6.0 - 8.4 g/dL 7.3    Albumin Latest Ref Range: 3.5 - 5.2 g/dL 3.9    BILIRUBIN TOTAL Latest Ref Range: 0.1 - 1.0 mg/dL  0.7    AST Latest Ref Range: 10 - 40 U/L 20    ALT Latest Ref Range: 10 - 44 U/L 25    AFP Latest Ref Range: 0.0 - 8.4 ng/mL 4.3        Assessment:     1. History of thyrotoxicosis  T3    T4, free    TSH   2. Hyperlipidemia, unspecified hyperlipidemia type  Comprehensive metabolic panel   3. Hypercholesterolemia  Comprehensive metabolic panel   4. Hashimoto's disease  T3    T4, free    TSH   5. Graves disease  T3   6. Thyroiditis  Thyroglobulin   7. Thyroid disease     8. Nodular thyroid disease     9. Abnormal thyroid blood test     10. NAFLD (nonalcoholic fatty liver disease)  Hemoglobin A1c    Uric acid    Comprehensive metabolic panel    Gamma GT   11. Hypovitaminosis D  Vitamin D    PTH, intact    Calcium, ionized   12. Dysmetabolic syndrome  Hemoglobin A1c    Urinalysis    Microalbumin/creatinine urine ratio    Comprehensive metabolic panel   13. Benign prostatic hyperplasia, unspecified whether lower urinary tract symptoms present  PSA, total and free        Regarding Graves disease; appears to be clinical quiescent at the moment as far as thyroid activity. Will recheck full TFTs and thyroid antibody profile.  Regarding thyroid nodular disease; to obtain ffup thyroid USS.  Regarding possible opthalmopathy; to continue formal opthalmic referral to evaluate for this.  Depending on the current status as regards antibody titers and presence or absence of orbitopathy may decide on permanent treatment for Graves.  His hx of NAFLD essentially exclude methimazole or PTU use as an option and his recent history of long term smoking makes radio iodine ablation a less than optimal option. Elective thyroidectomy would be the preferred management option for him and I have informed him as much but will await results of latest labs as detailed above.  Regarding NAFLD; ongoing ffup as per hepatology group and rec as regarding ETOH intake as per hepatology team.  Regarding hyperlipidemia; to continue antilipidemic therapy as  before.       Plan:       FFup in ~ 6mths

## 2019-09-21 LAB
THRYOGLOBULIN INTERPRETATION: ABNORMAL
THYROGLOB AB SERPL-ACNC: 24 IU/ML
THYROGLOB SERPL-MCNC: 1.4 NG/ML

## 2019-09-30 DIAGNOSIS — G89.29 CHRONIC LOW BACK PAIN, UNSPECIFIED BACK PAIN LATERALITY, WITH SCIATICA PRESENCE UNSPECIFIED: ICD-10-CM

## 2019-09-30 DIAGNOSIS — M54.5 CHRONIC LOW BACK PAIN, UNSPECIFIED BACK PAIN LATERALITY, WITH SCIATICA PRESENCE UNSPECIFIED: ICD-10-CM

## 2019-09-30 DIAGNOSIS — R42 VERTIGO: ICD-10-CM

## 2019-09-30 DIAGNOSIS — M54.2 CHRONIC NECK PAIN: ICD-10-CM

## 2019-09-30 DIAGNOSIS — G89.29 CHRONIC NECK PAIN: ICD-10-CM

## 2019-09-30 RX ORDER — MECLIZINE HYDROCHLORIDE 25 MG/1
25 TABLET ORAL 3 TIMES DAILY PRN
Qty: 30 TABLET | Refills: 1 | Status: SHIPPED | OUTPATIENT
Start: 2019-09-30 | End: 2021-01-20

## 2019-09-30 RX ORDER — GABAPENTIN 300 MG/1
300 CAPSULE ORAL 3 TIMES DAILY
Qty: 90 CAPSULE | Refills: 0 | Status: SHIPPED | OUTPATIENT
Start: 2019-09-30

## 2019-10-03 ENCOUNTER — OFFICE VISIT (OUTPATIENT)
Dept: ORTHOPEDICS | Facility: CLINIC | Age: 54
End: 2019-10-03
Payer: COMMERCIAL

## 2019-10-03 DIAGNOSIS — M17.10 ARTHRITIS OF KNEE: Primary | ICD-10-CM

## 2019-10-03 PROCEDURE — 99499 UNLISTED E&M SERVICE: CPT | Mod: S$GLB,,, | Performed by: ORTHOPAEDIC SURGERY

## 2019-10-03 PROCEDURE — 99499 NO LOS: ICD-10-PCS | Mod: S$GLB,,, | Performed by: ORTHOPAEDIC SURGERY

## 2019-10-03 PROCEDURE — 20610 LARGE JOINT ASPIRATION/INJECTION: R KNEE, L KNEE: ICD-10-PCS | Mod: 50,S$GLB,, | Performed by: ORTHOPAEDIC SURGERY

## 2019-10-03 PROCEDURE — 99999 PR PBB SHADOW E&M-EST. PATIENT-LVL II: CPT | Mod: PBBFAC,,, | Performed by: ORTHOPAEDIC SURGERY

## 2019-10-03 PROCEDURE — 20610 DRAIN/INJ JOINT/BURSA W/O US: CPT | Mod: 50,S$GLB,, | Performed by: ORTHOPAEDIC SURGERY

## 2019-10-03 PROCEDURE — 99999 PR PBB SHADOW E&M-EST. PATIENT-LVL II: ICD-10-PCS | Mod: PBBFAC,,, | Performed by: ORTHOPAEDIC SURGERY

## 2019-10-03 NOTE — PROGRESS NOTES
Past Medical History:   Diagnosis Date    Arthritis     Chronic neck and back pain     DDD (degenerative disc disease), cervical     Graves disease     Hyperthyroidism 11/18/2015       Past Surgical History:   Procedure Laterality Date    COLONOSCOPY N/A 9/5/2018    Procedure: COLONOSCOPY;  Surgeon: Keith Arellano MD;  Location: [facility];  Service: Endoscopy;  Laterality: N/A;    FINGER SURGERY      removal of steel    KNEE SURGERY Left 01/2015    repair       Current Outpatient Medications   Medication Sig    aspirin (ECOTRIN) 81 MG EC tablet TAKE ONE TABLET BY MOUTH ONCE DAILY    b complex vitamins tablet Take 1 tablet by mouth once daily.    betamethasone dipropionate (DIPROLENE) 0.05 % ointment AAA hand BID PRN flare    clobetasol (TEMOVATE) 0.05 % cream Thin film to AA hands BID PRN flare    gabapentin (NEURONTIN) 300 MG capsule Take 1 capsule (300 mg total) by mouth 3 (three) times daily.    meclizine (ANTIVERT) 25 mg tablet Take 1 tablet (25 mg total) by mouth 3 (three) times daily as needed.    meloxicam (MOBIC) 15 MG tablet Take 15 mg by mouth once daily.    morphine (MS CONTIN) 15 MG 12 hr tablet Take 15 mg by mouth as needed.    multivitamin capsule Take 1 capsule by mouth once daily.    oxyCODONE (ROXICODONE) 15 MG Tab Take 15 mg by mouth daily as needed.    rosuvastatin (CRESTOR) 10 MG tablet TAKE ONE TABLET BY MOUTH once a day    tizanidine (ZANAFLEX) 4 MG tablet Take 2 mg by mouth every 8 (eight) hours.     clonazePAM (KLONOPIN) 0.5 MG tablet Take 1 tablet (0.5 mg total) by mouth nightly as needed for Anxiety.     No current facility-administered medications for this visit.        Review of patient's allergies indicates:   Allergen Reactions    Bactrim [sulfamethoxazole-trimethoprim]      Possibly allergic reaction       Family History   Problem Relation Age of Onset    Heart disease Father 52        MI    Macular degeneration Mother     Melanoma Mother     Lupus Neg Hx      Eczema Neg Hx     Psoriasis Neg Hx     Glaucoma Neg Hx     Retinal detachment Neg Hx        Social History     Socioeconomic History    Marital status:      Spouse name: Not on file    Number of children: Not on file    Years of education: Not on file    Highest education level: Not on file   Occupational History    Occupation: disabled   Social Needs    Financial resource strain: Not on file    Food insecurity:     Worry: Not on file     Inability: Not on file    Transportation needs:     Medical: Not on file     Non-medical: Not on file   Tobacco Use    Smoking status: Former Smoker     Last attempt to quit: 2014     Years since quittin.9    Smokeless tobacco: Never Used   Substance and Sexual Activity    Alcohol use: Yes     Comment: occas    Drug use: No    Sexual activity: Not on file   Lifestyle    Physical activity:     Days per week: Not on file     Minutes per session: Not on file    Stress: Not on file   Relationships    Social connections:     Talks on phone: Not on file     Gets together: Not on file     Attends Scientology service: Not on file     Active member of club or organization: Not on file     Attends meetings of clubs or organizations: Not on file     Relationship status: Not on file   Other Topics Concern    Not on file   Social History Narrative    Not on file       Chief Complaint:   Chief Complaint   Patient presents with    Knee Pain     B synvisc one       History of present illness: This is a 53-year-old male seen for bilateral knee pain.  Patient states that he was told that he had arthritis in his knees. Has a history of a left meniscus surgery back in .  Patient has lot of popping and cracking.  Feels like his knees are going to lock up on him.  Left knee is the worst.  Been getting worse over the last few months.  No new injury or trauma.  Pain is a 5/10.      Review of Systems:    Constitution: Negative for chills, fever, and sweats.   Negative for unexplained weight loss.    HENT:  Negative for headaches and blurry vision.    Cardiovascular:Negative for chest pain or irregular heart beat. Negative for hypertension.    Respiratory:  Negative for cough and shortness of breath.    Gastrointestinal: Negative for abdominal pain, heartburn, melena, nausea, and vomitting.    Genitourinary:  Negative bladder incontinence and dysuria.    Musculoskeletal:  See HPI    Neurological: Negative for numbness.    Psychiatric/Behavioral: Negative for depression.  The patient is not nervous/anxious.      Endocrine: Negative for polyuria    Hematologic/Lymphatic: Negative for bleeding problem.  Does not bruise/bleed easily.    Skin: Negative for poor would healing and rash      Physical Examination:    Vital Signs:    There were no vitals filed for this visit.    There is no height or weight on file to calculate BMI.    This a well-developed, well nourished patient in no acute distress.  They are alert and oriented and cooperative to examination.  Pt. walks without an antalgic gait.      Examination of bilateral knees shows no rashes or erythema. There are no masses ecchymosis or effusion. Patient has full range of motion from 0-130°. Patient is nontender to palpation over lateral joint line and nontender to palpation over the medial joint line. Patient has a - Lachman exam, - anterior drawer exam, and - posterior drawer exam. - Ruddy's exam. Knee is stable to varus and valgus stress. 5 out of 5 motor strength. Palpable distal pulses. Intact light touch sensation.  Moderate Patellofemoral crepitus      X-rays: X-rays of both knees are ordered and reviewed which show very mild arthritic changes.  Very mild medial narrowing on the left and some patellofemoral arthritic changes     Assessment:  Bilateral patellofemoral arthritis    Plan:  I injected both knees with Synvisc-One today. Patient had a vasovagal reaction.  We watched him for a few minutes.  His vital  signs were stable and he felt fine by the time he left.      This note was created using voice recognition software that occasionally misinterpreted phrases or words.    Consult note is delivered via Epic messaging service.

## 2019-10-03 NOTE — PROCEDURES
Large Joint Aspiration/Injection: R knee, L knee  Date/Time: 10/3/2019 2:45 PM  Performed by: Sanchez Hernandez MD  Authorized by: Sanchez Hernandez MD     Consent Done?:  Yes (Verbal)  Indications:  Pain  Procedure site marked: Yes    Timeout: Prior to procedure the correct patient, procedure, and site was verified      Location:  Knee  Site:  R knee and L knee  Prep: Patient was prepped and draped in usual sterile fashion    Needle size:  20 G  Approach:  Anterolateral  Medications:  48 mg hylan g-f 20 48 mg/6 mL; 48 mg hylan g-f 20 48 mg/6 mL  Patient tolerance:  Patient tolerated the procedure well with no immediate complications

## 2019-10-08 ENCOUNTER — OFFICE VISIT (OUTPATIENT)
Dept: DERMATOLOGY | Facility: CLINIC | Age: 54
End: 2019-10-08
Payer: COMMERCIAL

## 2019-10-08 DIAGNOSIS — D22.9 MULTIPLE BENIGN NEVI: ICD-10-CM

## 2019-10-08 DIAGNOSIS — L91.8 SKIN TAGS, MULTIPLE ACQUIRED: ICD-10-CM

## 2019-10-08 DIAGNOSIS — L73.8 SEBACEOUS HYPERPLASIA OF FACE: ICD-10-CM

## 2019-10-08 DIAGNOSIS — L81.4 SOLAR LENTIGO: ICD-10-CM

## 2019-10-08 DIAGNOSIS — D18.01 CHERRY ANGIOMA: ICD-10-CM

## 2019-10-08 DIAGNOSIS — L30.9 HAND DERMATITIS: ICD-10-CM

## 2019-10-08 DIAGNOSIS — L82.1 SEBORRHEIC KERATOSES: Primary | ICD-10-CM

## 2019-10-08 PROCEDURE — 99999 PR PBB SHADOW E&M-EST. PATIENT-LVL II: ICD-10-PCS | Mod: PBBFAC,,, | Performed by: DERMATOLOGY

## 2019-10-08 PROCEDURE — 99999 PR PBB SHADOW E&M-EST. PATIENT-LVL II: CPT | Mod: PBBFAC,,, | Performed by: DERMATOLOGY

## 2019-10-08 PROCEDURE — 99213 OFFICE O/P EST LOW 20 MIN: CPT | Mod: S$GLB,,, | Performed by: DERMATOLOGY

## 2019-10-08 PROCEDURE — 99213 PR OFFICE/OUTPT VISIT, EST, LEVL III, 20-29 MIN: ICD-10-PCS | Mod: S$GLB,,, | Performed by: DERMATOLOGY

## 2019-10-08 NOTE — PROGRESS NOTES
Subjective:       Patient ID:  Narciso Mckeon is a 53 y.o. male who presents for   Chief Complaint   Patient presents with    Spot     under bilateral arms x several months, occasionally get painful     last OV 5-    With Hand dermatitis, improved with clobetasol cream and improved moisturizer. Realized that contact with pressure treated lumbar tends to flare him    Patient here today with c/o spots under bilateral arms x several months that become painful, wants to have the moles checked to be sure it is not them acting up    No PHX NMSC      Review of Systems   Constitutional: Negative for fever, chills and fatigue.   Skin: Positive for activity-related sunscreen use and wears hat. Negative for daily sunscreen use.   Hematologic/Lymphatic: Bruises/bleeds easily.        Objective:    Physical Exam   Constitutional: He appears well-developed and well-nourished. No distress.   Neurological: He is alert and oriented to person, place, and time. He is not disoriented.   Psychiatric: He has a normal mood and affect.   Skin:   Areas Examined (abnormalities noted in diagram):   Scalp / Hair Palpated and Inspected  Head / Face Inspection Performed  Neck Inspection Performed  Chest / Axilla Inspection Performed  Abdomen Inspection Performed  Back Inspection Performed  RUE Inspected  LUE Inspection Performed  Nails and Digits Inspection Performed                       Diagram Legend     Erythematous scaling macule/papule c/w actinic keratosis       Vascular papule c/w angioma      Pigmented verrucoid papule/plaque c/w seborrheic keratosis      Yellow umbilicated papule c/w sebaceous hyperplasia      Irregularly shaped tan macule c/w lentigo     1-2 mm smooth white papules consistent with Milia      Movable subcutaneous cyst with punctum c/w epidermal inclusion cyst      Subcutaneous movable cyst c/w pilar cyst      Firm pink to brown papule c/w dermatofibroma      Pedunculated fleshy papule(s) c/w skin  tag(s)      Evenly pigmented macule c/w junctional nevus     Mildly variegated pigmented, slightly irregular-bordered macule c/w mildly atypical nevus      Flesh colored to evenly pigmented papule c/w intradermal nevus       Pink pearly papule/plaque c/w basal cell carcinoma      Erythematous hyperkeratotic cursted plaque c/w SCC      Surgical scar with no sign of skin cancer recurrence      Open and closed comedones      Inflammatory papules and pustules      Verrucoid papule consistent consistent with wart     Erythematous eczematous patches and plaques     Dystrophic onycholytic nail with subungual debris c/w onychomycosis     Umbilicated papule    Erythematous-base heme-crusted tan verrucoid plaque consistent with inflamed seborrheic keratosis     Erythematous Silvery Scaling Plaque c/w Psoriasis     See annotation      Assessment / Plan:        Seborrheic keratoses  These are benign inherited growths without a malignant potential. Reassurance given to patient. No treatment is necessary.     Skin tags, multiple acquired  Reassurance given to patient. No treatment is necessary.   Treatment of benign, asymptomatic lesions may be considered cosmetic.    Solar lentigo  This is a benign hyperpigmented sun induced lesion. Daily sun protection will reduce the number of new lesions. Treatment of these benign lesions are considered cosmetic.    Hand dermatitis  Improved with aggressive moisturizer  May use clobetasol cream PRN    Multiple benign nevi  Monitor for new mole or moles that are becoming bigger, darker, irritated, or developing irregular borders.     Cherry angioma  This is a benign vascular lesion. Reassurance given. No treatment required.     Sebaceous hyperplasia of face  This is a common condition representing benign enlargement of the sebaceous lobule. It typically occurs in adulthood. Reassurance given to patient.     Patient instructed in importance in daily sun protection of at least spf 30. Mineral  sunscreen ingredients preferred (Zinc +/- Titanium).   Recommend Elta MD for daily use on face and neck.  Patient encouraged to wear hat for all outdoor exposure.   Also discussed sun avoidance and use of protective clothing.               Follow up if symptoms worsen or fail to improve.

## 2019-11-18 ENCOUNTER — PATIENT MESSAGE (OUTPATIENT)
Dept: ORTHOPEDICS | Facility: CLINIC | Age: 54
End: 2019-11-18

## 2020-01-03 ENCOUNTER — OFFICE VISIT (OUTPATIENT)
Dept: INTERNAL MEDICINE | Facility: CLINIC | Age: 55
End: 2020-01-03
Payer: COMMERCIAL

## 2020-01-03 ENCOUNTER — LAB VISIT (OUTPATIENT)
Dept: LAB | Facility: HOSPITAL | Age: 55
End: 2020-01-03
Attending: INTERNAL MEDICINE
Payer: COMMERCIAL

## 2020-01-03 VITALS
TEMPERATURE: 99 F | SYSTOLIC BLOOD PRESSURE: 112 MMHG | BODY MASS INDEX: 32.1 KG/M2 | WEIGHT: 224.19 LBS | HEART RATE: 107 BPM | DIASTOLIC BLOOD PRESSURE: 66 MMHG | HEIGHT: 70 IN

## 2020-01-03 DIAGNOSIS — E78.5 HYPERLIPIDEMIA, UNSPECIFIED HYPERLIPIDEMIA TYPE: ICD-10-CM

## 2020-01-03 DIAGNOSIS — Z00.00 ANNUAL PHYSICAL EXAM: Primary | ICD-10-CM

## 2020-01-03 DIAGNOSIS — G89.29 OTHER CHRONIC BACK PAIN: ICD-10-CM

## 2020-01-03 DIAGNOSIS — M54.9 OTHER CHRONIC BACK PAIN: ICD-10-CM

## 2020-01-03 DIAGNOSIS — M17.10 ARTHRITIS OF KNEE: ICD-10-CM

## 2020-01-03 DIAGNOSIS — E05.00 GRAVES DISEASE: ICD-10-CM

## 2020-01-03 DIAGNOSIS — K76.0 NAFLD (NONALCOHOLIC FATTY LIVER DISEASE): ICD-10-CM

## 2020-01-03 DIAGNOSIS — Z00.00 ANNUAL PHYSICAL EXAM: ICD-10-CM

## 2020-01-03 DIAGNOSIS — J10.1 INFLUENZA A: ICD-10-CM

## 2020-01-03 PROBLEM — E78.00 HYPERCHOLESTEROLEMIA: Status: RESOLVED | Noted: 2018-05-25 | Resolved: 2020-01-03

## 2020-01-03 LAB
ALBUMIN SERPL BCP-MCNC: 3.9 G/DL (ref 3.5–5.2)
ALP SERPL-CCNC: 130 U/L (ref 55–135)
ALT SERPL W/O P-5'-P-CCNC: 65 U/L (ref 10–44)
ANION GAP SERPL CALC-SCNC: 7 MMOL/L (ref 8–16)
AST SERPL-CCNC: 30 U/L (ref 10–40)
BILIRUB SERPL-MCNC: 1.1 MG/DL (ref 0.1–1)
BUN SERPL-MCNC: 19 MG/DL (ref 6–20)
CALCIUM SERPL-MCNC: 9.2 MG/DL (ref 8.7–10.5)
CHLORIDE SERPL-SCNC: 107 MMOL/L (ref 95–110)
CHOLEST SERPL-MCNC: 191 MG/DL (ref 120–199)
CHOLEST/HDLC SERPL: 4.4 {RATIO} (ref 2–5)
CO2 SERPL-SCNC: 25 MMOL/L (ref 23–29)
CREAT SERPL-MCNC: 1.3 MG/DL (ref 0.5–1.4)
EST. GFR  (AFRICAN AMERICAN): >60 ML/MIN/1.73 M^2
EST. GFR  (NON AFRICAN AMERICAN): >60 ML/MIN/1.73 M^2
GLUCOSE SERPL-MCNC: 108 MG/DL (ref 70–110)
HDLC SERPL-MCNC: 43 MG/DL (ref 40–75)
HDLC SERPL: 22.5 % (ref 20–50)
INFLUENZA A, MOLECULAR: POSITIVE
INFLUENZA B, MOLECULAR: NEGATIVE
LDLC SERPL CALC-MCNC: 116.8 MG/DL (ref 63–159)
NONHDLC SERPL-MCNC: 148 MG/DL
POTASSIUM SERPL-SCNC: 4.9 MMOL/L (ref 3.5–5.1)
PROT SERPL-MCNC: 7.5 G/DL (ref 6–8.4)
SODIUM SERPL-SCNC: 139 MMOL/L (ref 136–145)
SPECIMEN SOURCE: ABNORMAL
TRIGL SERPL-MCNC: 156 MG/DL (ref 30–150)

## 2020-01-03 PROCEDURE — 99396 PREV VISIT EST AGE 40-64: CPT | Mod: S$GLB,,, | Performed by: INTERNAL MEDICINE

## 2020-01-03 PROCEDURE — 99396 PR PREVENTIVE VISIT,EST,40-64: ICD-10-PCS | Mod: S$GLB,,, | Performed by: INTERNAL MEDICINE

## 2020-01-03 PROCEDURE — 80061 LIPID PANEL: CPT

## 2020-01-03 PROCEDURE — 36415 COLL VENOUS BLD VENIPUNCTURE: CPT

## 2020-01-03 PROCEDURE — 99999 PR PBB SHADOW E&M-EST. PATIENT-LVL III: ICD-10-PCS | Mod: PBBFAC,,, | Performed by: INTERNAL MEDICINE

## 2020-01-03 PROCEDURE — 87502 INFLUENZA DNA AMP PROBE: CPT

## 2020-01-03 PROCEDURE — 99999 PR PBB SHADOW E&M-EST. PATIENT-LVL III: CPT | Mod: PBBFAC,,, | Performed by: INTERNAL MEDICINE

## 2020-01-03 PROCEDURE — 80053 COMPREHEN METABOLIC PANEL: CPT

## 2020-01-03 NOTE — PROGRESS NOTES
INTERNAL MEDICINE ANNUAL VISIT NOTE      CHIEF COMPLAINT     ANNUAL    HPI     Narciso Mckeon is a 54 y.o. C male who presents for annual.    Son was sick w/ the flu.   12/20/19, poison ivy. Went to . Had flu vaccine.   Teeth hurts, sinus hurts. Went to  and swabbed for flu and neg on 12/30/19. Yesterday sweats.     OA of B knees - had synvisc. Had a vasovagal episode w/ syncope.  Pt reports that he had severe knee swelling/tight skin. Reports R knee feels worse now than previously. Does stop him from doing what he normally does but does not want surgery.  Has chronic LBP. Follows w/ Dr. Maldonado in [place], [place].   On morphine ER 15mg BID, oxycodone 15mg qid prn, gabapentin 300mg TID, mobic 15mg daily.     Graves - follows w/ endocrinology.  Thyroglobulin 1.4 9/20/19    HLD - crestor 10mg daily    Past Medical History:  Past Medical History:   Diagnosis Date    Arthritis     Chronic neck and back pain     DDD (degenerative disc disease), cervical     Graves disease     Hyperthyroidism 11/18/2015       Past Surgical History:  Past Surgical History:   Procedure Laterality Date    COLONOSCOPY N/A 9/5/2018    Procedure: COLONOSCOPY;  Surgeon: Keith Arellano MD;  Location: [facility];  Service: Endoscopy;  Laterality: N/A;    FINGER SURGERY      removal of steel    KNEE SURGERY Left 01/2015    repair       Allergies:  Review of patient's allergies indicates:   Allergen Reactions    Bactrim [sulfamethoxazole-trimethoprim]      Possibly allergic reaction       Home Medications:  Prior to Admission medications    Medication Sig Start Date End Date Taking? Authorizing Provider   aspirin (ECOTRIN) 81 MG EC tablet TAKE ONE TABLET BY MOUTH ONCE DAILY 6/1/19  Yes Migdalia Connor MD   b complex vitamins tablet Take 1 tablet by mouth once daily.   Yes Historical Provider, MD   betamethasone dipropionate (DIPROLENE) 0.05 % ointment AAA hand BID PRN flare 2/1/18  Yes Alysia Obrien MD   clobetasol (TEMOVATE)  0.05 % cream Thin film to AA hands BID PRN flare 18  Yes Alysia Obrien MD   clonazePAM (KLONOPIN) 0.5 MG tablet Take 1 tablet (0.5 mg total) by mouth nightly as needed for Anxiety. 2/21/18 1/3/20 Yes Migdalia Connor MD   gabapentin (NEURONTIN) 300 MG capsule Take 1 capsule (300 mg total) by mouth 3 (three) times daily. 19  Yes CY Brizuela   meclizine (ANTIVERT) 25 mg tablet Take 1 tablet (25 mg total) by mouth 3 (three) times daily as needed. 19  Yes CY Brizuela   meloxicam (MOBIC) 15 MG tablet Take 15 mg by mouth once daily.   Yes Historical Provider, MD   morphine (MS CONTIN) 15 MG 12 hr tablet Take 15 mg by mouth as needed. 18  Yes Historical Provider, MD   multivitamin capsule Take 1 capsule by mouth once daily.   Yes Historical Provider, MD   oxyCODONE (ROXICODONE) 15 MG Tab Take 15 mg by mouth daily as needed. 18  Yes Historical Provider, MD   rosuvastatin (CRESTOR) 10 MG tablet TAKE ONE TABLET BY MOUTH once a day 19  Yes Migdalia Connor MD   tizanidine (ZANAFLEX) 4 MG tablet Take 2 mg by mouth every 8 (eight) hours.  16  Yes Historical Provider, MD       Family History:  Family History   Problem Relation Age of Onset    Heart disease Father 52        MI    Macular degeneration Mother     Melanoma Mother     Lupus Neg Hx     Eczema Neg Hx     Psoriasis Neg Hx     Glaucoma Neg Hx     Retinal detachment Neg Hx        Social History:  Social History     Tobacco Use    Smoking status: Former Smoker     Last attempt to quit: 2014     Years since quittin.1    Smokeless tobacco: Never Used   Substance Use Topics    Alcohol use: Yes     Comment: occas    Drug use: No       Review of Systems:  Review of Systems Comprehensive review of systems otherwise negative. See history/subjective section for more details.    Health Maintainence:    reviewed.    PHYSICAL EXAM     /66 (BP Location: Right arm, Patient Position: Sitting, BP Method: Large  "(Manual))   Pulse 107   Temp 98.6 °F (37 °C)   Ht 5' 10" (1.778 m)   Wt 101.7 kg (224 lb 3.3 oz)   BMI 32.17 kg/m²     GEN - A+OX4, NAD   HEENT - PERRL, EOMI, OP clear. MMM. TM dullness. Maxillary sinuses w/ some mild tenderness to palpation.   Neck - No thyromegaly or cervical LAD. No thyroid masses felt.  CV - RRR, no m/r   Chest - CTAB, no wheezing or rhonchi  Abd - S/NT/ND/+BS.   Ext - 2+BDP and radial pulses. No LE edema.   Neuro - PERRL, EOMI, no nystagmus, eyebrow raise, facial sensation, hearing, m of mastication, smile, palatal raise, shoulder shrug, tongue protrusion symmetric and intact. 5/5 BUE and BLE strength. Sensation to light touch intact throughout. 2+ DTRs. Normal gait.   MSK - No spinal tenderness to palpation. Normal gait.   Skin - redness w/ some excoriations of RUE.     LABS     Previous labs reviewed.    ASSESSMENT/PLAN     Narciso Mckeon is a 54 y.o. male with  Diagnoses and all orders for this visit:    Annual physical exam  -     Comprehensive metabolic panel; Future; Expected date: 01/03/2020  -     Lipid panel; Future; Expected date: 01/03/2020    Graves disease - f/u w/ endo.     Other chronic back pain - f/u w/ pain mgmt. Cont current meds.     Arthritis of knee - if symptoms worsen, pt will f/u w/ ortho for possible surgery. Cont current meds for pain mgmt.    NAFLD (nonalcoholic fatty liver disease) - f/u w/ hepatology.    Hyperlipidemia, unspecified hyperlipidemia type  -     Comprehensive metabolic panel; Future; Expected date: 01/03/2020  -     Lipid panel; Future; Expected date: 01/03/2020    Influenza A - flu a positive. Symptomatic management.  -     Influenza A & B by Molecular      RTC in 12 months, sooner if needed and depending on labs.    Migdalia Connor MD  Department of Internal Medicine - [facility]  11:11 AM  "

## 2020-01-20 RX ORDER — ROSUVASTATIN CALCIUM 10 MG/1
TABLET, COATED ORAL
Qty: 90 TABLET | Refills: 3 | Status: SHIPPED | OUTPATIENT
Start: 2020-01-20 | End: 2021-02-22

## 2020-01-31 ENCOUNTER — TELEPHONE (OUTPATIENT)
Dept: OPHTHALMOLOGY | Facility: CLINIC | Age: 55
End: 2020-01-31

## 2020-01-31 NOTE — TELEPHONE ENCOUNTER
----- Message from Alis Kim sent at 1/31/2020  3:11 PM CST -----  Type: Needs Medical Advice    Who Called:  Anya (spouse)  Best Call Back Number: [phone]  Additional Information: would like to schedule f/u appt on 02/17/2020 at 3:30 with spouse. Please give call back

## 2020-02-17 ENCOUNTER — OFFICE VISIT (OUTPATIENT)
Dept: OPHTHALMOLOGY | Facility: CLINIC | Age: 55
End: 2020-02-17
Payer: COMMERCIAL

## 2020-02-17 DIAGNOSIS — H52.7 REFRACTIVE ERROR: ICD-10-CM

## 2020-02-17 DIAGNOSIS — Z83.518 FAMILY HISTORY OF MACULAR DEGENERATION: ICD-10-CM

## 2020-02-17 DIAGNOSIS — H47.391 MYELINATED OPTIC NERVE FIBER LAYER, RIGHT: ICD-10-CM

## 2020-02-17 DIAGNOSIS — H40.039 ANATOMICAL NARROW ANGLE: Primary | ICD-10-CM

## 2020-02-17 DIAGNOSIS — E05.00 GRAVES DISEASE: ICD-10-CM

## 2020-02-17 PROCEDURE — 99999 PR PBB SHADOW E&M-EST. PATIENT-LVL III: CPT | Mod: PBBFAC,,, | Performed by: OPHTHALMOLOGY

## 2020-02-17 PROCEDURE — 99211 PR OFFICE/OUTPT VISIT, EST, LEVL I: ICD-10-PCS | Mod: S$GLB,,, | Performed by: OPHTHALMOLOGY

## 2020-02-17 PROCEDURE — 99211 OFF/OP EST MAY X REQ PHY/QHP: CPT | Mod: S$GLB,,, | Performed by: OPHTHALMOLOGY

## 2020-02-17 PROCEDURE — 92015 DETERMINE REFRACTIVE STATE: CPT | Mod: S$GLB,,, | Performed by: OPHTHALMOLOGY

## 2020-02-17 PROCEDURE — 99999 PR PBB SHADOW E&M-EST. PATIENT-LVL III: ICD-10-PCS | Mod: PBBFAC,,, | Performed by: OPHTHALMOLOGY

## 2020-02-17 PROCEDURE — 92015 PR REFRACTION: ICD-10-PCS | Mod: S$GLB,,, | Performed by: OPHTHALMOLOGY

## 2020-02-17 RX ORDER — AMOXICILLIN 500 MG/1
CAPSULE ORAL
COMMUNITY
Start: 2020-02-13 | End: 2020-07-15

## 2020-02-17 NOTE — PROGRESS NOTES
HPI     DLS: 8/16/19    Pt here for MRX only. Pt not due until August 2020 for full dilated exam.   Pt states needs new spec rx. States has been having a dry feeling off and   on OD. Has been going on for abt a year. Using AT's PRN.     Last edited by Jacqueline Malik on 2/17/2020  3:29 PM. (History)            Assessment /Plan     For exam results, see Encounter Report.    Anatomical narrow angle    Family history of macular degeneration    Graves disease    Myelinated optic nerve fiber layer, right    Refractive error      MRx given today.    Open to thin TM. Repeat gonioscopy next visit. IOP and C:D remain WNL. Reviewed ACG signs and symptoms.     Mother received injections. Macula currently looks stable    No lagophthalmos. Continue lubrication with artificial tears prn. Denies diplopia, EOM's appear normal. Mitch WNL.     Appears to have myelinated nerve fibers inferior disc OD, but not previously documented. Due to Graves, baseline photos taken today 8/9/18. Pt then recalled he had photos on his phone by his friend, Dr. Adkins - baseline photos from 8/9/18 consistent with those. Baseline OCT nerve done 2/5/19.      Follow up in as previously scheduled (around 8/6/2020) for Gonio, IOP check, DFE with 1% only, OCT optic nerve, MRx.

## 2020-03-06 ENCOUNTER — OFFICE VISIT (OUTPATIENT)
Dept: ENDOCRINOLOGY | Facility: CLINIC | Age: 55
End: 2020-03-06
Payer: COMMERCIAL

## 2020-03-06 ENCOUNTER — LAB VISIT (OUTPATIENT)
Dept: LAB | Facility: HOSPITAL | Age: 55
End: 2020-03-06
Attending: INTERNAL MEDICINE
Payer: COMMERCIAL

## 2020-03-06 VITALS
SYSTOLIC BLOOD PRESSURE: 126 MMHG | DIASTOLIC BLOOD PRESSURE: 82 MMHG | HEIGHT: 70 IN | HEART RATE: 78 BPM | BODY MASS INDEX: 33.23 KG/M2 | TEMPERATURE: 98 F | WEIGHT: 232.13 LBS

## 2020-03-06 DIAGNOSIS — E06.9 THYROIDITIS: ICD-10-CM

## 2020-03-06 DIAGNOSIS — E88.810 DYSMETABOLIC SYNDROME: ICD-10-CM

## 2020-03-06 DIAGNOSIS — E06.3 HASHIMOTO'S DISEASE: ICD-10-CM

## 2020-03-06 DIAGNOSIS — K76.0 NAFLD (NONALCOHOLIC FATTY LIVER DISEASE): ICD-10-CM

## 2020-03-06 DIAGNOSIS — R79.89 ABNORMAL THYROID BLOOD TEST: ICD-10-CM

## 2020-03-06 DIAGNOSIS — E05.00 GRAVES DISEASE: ICD-10-CM

## 2020-03-06 DIAGNOSIS — E04.9 GOITER: ICD-10-CM

## 2020-03-06 DIAGNOSIS — E78.5 HYPERLIPIDEMIA, UNSPECIFIED HYPERLIPIDEMIA TYPE: ICD-10-CM

## 2020-03-06 DIAGNOSIS — E55.9 HYPOVITAMINOSIS D: ICD-10-CM

## 2020-03-06 DIAGNOSIS — Z86.39 HISTORY OF THYROTOXICOSIS: ICD-10-CM

## 2020-03-06 DIAGNOSIS — E04.1 NODULAR THYROID DISEASE: Primary | ICD-10-CM

## 2020-03-06 LAB
BASOPHILS # BLD AUTO: 0.04 K/UL (ref 0–0.2)
BASOPHILS NFR BLD: 0.7 % (ref 0–1.9)
CA-I BLDV-SCNC: 1.27 MMOL/L (ref 1.06–1.42)
DIFFERENTIAL METHOD: ABNORMAL
EOSINOPHIL # BLD AUTO: 0.1 K/UL (ref 0–0.5)
EOSINOPHIL NFR BLD: 1.4 % (ref 0–8)
ERYTHROCYTE [DISTWIDTH] IN BLOOD BY AUTOMATED COUNT: 13 % (ref 11.5–14.5)
HCT VFR BLD AUTO: 42.3 % (ref 40–54)
HGB BLD-MCNC: 14.4 G/DL (ref 14–18)
IMM GRANULOCYTES # BLD AUTO: 0.02 K/UL (ref 0–0.04)
IMM GRANULOCYTES NFR BLD AUTO: 0.3 % (ref 0–0.5)
LYMPHOCYTES # BLD AUTO: 2 K/UL (ref 1–4.8)
LYMPHOCYTES NFR BLD: 33.8 % (ref 18–48)
MCH RBC QN AUTO: 34.7 PG (ref 27–31)
MCHC RBC AUTO-ENTMCNC: 34 G/DL (ref 32–36)
MCV RBC AUTO: 102 FL (ref 82–98)
MONOCYTES # BLD AUTO: 0.6 K/UL (ref 0.3–1)
MONOCYTES NFR BLD: 9.8 % (ref 4–15)
NEUTROPHILS # BLD AUTO: 3.1 K/UL (ref 1.8–7.7)
NEUTROPHILS NFR BLD: 54 % (ref 38–73)
NRBC BLD-RTO: 0 /100 WBC
PLATELET # BLD AUTO: 192 K/UL (ref 150–350)
PMV BLD AUTO: 9.9 FL (ref 9.2–12.9)
PTH-INTACT SERPL-MCNC: 35 PG/ML (ref 9–77)
RBC # BLD AUTO: 4.15 M/UL (ref 4.6–6.2)
WBC # BLD AUTO: 5.8 K/UL (ref 3.9–12.7)

## 2020-03-06 PROCEDURE — 84443 ASSAY THYROID STIM HORMONE: CPT

## 2020-03-06 PROCEDURE — 3008F PR BODY MASS INDEX (BMI) DOCUMENTED: ICD-10-PCS | Mod: CPTII,S$GLB,, | Performed by: INTERNAL MEDICINE

## 2020-03-06 PROCEDURE — 99214 PR OFFICE/OUTPT VISIT, EST, LEVL IV, 30-39 MIN: ICD-10-PCS | Mod: S$GLB,,, | Performed by: INTERNAL MEDICINE

## 2020-03-06 PROCEDURE — 84550 ASSAY OF BLOOD/URIC ACID: CPT

## 2020-03-06 PROCEDURE — 99999 PR PBB SHADOW E&M-EST. PATIENT-LVL III: CPT | Mod: PBBFAC,,, | Performed by: INTERNAL MEDICINE

## 2020-03-06 PROCEDURE — 82306 VITAMIN D 25 HYDROXY: CPT

## 2020-03-06 PROCEDURE — 99999 PR PBB SHADOW E&M-EST. PATIENT-LVL III: ICD-10-PCS | Mod: PBBFAC,,, | Performed by: INTERNAL MEDICINE

## 2020-03-06 PROCEDURE — 3008F BODY MASS INDEX DOCD: CPT | Mod: CPTII,S$GLB,, | Performed by: INTERNAL MEDICINE

## 2020-03-06 PROCEDURE — 84439 ASSAY OF FREE THYROXINE: CPT

## 2020-03-06 PROCEDURE — 85025 COMPLETE CBC W/AUTO DIFF WBC: CPT

## 2020-03-06 PROCEDURE — 84480 ASSAY TRIIODOTHYRONINE (T3): CPT

## 2020-03-06 PROCEDURE — 86800 THYROGLOBULIN ANTIBODY: CPT

## 2020-03-06 PROCEDURE — 82330 ASSAY OF CALCIUM: CPT

## 2020-03-06 PROCEDURE — 83970 ASSAY OF PARATHORMONE: CPT

## 2020-03-06 PROCEDURE — 36415 COLL VENOUS BLD VENIPUNCTURE: CPT | Mod: PO

## 2020-03-06 PROCEDURE — 82977 ASSAY OF GGT: CPT

## 2020-03-06 PROCEDURE — 99214 OFFICE O/P EST MOD 30 MIN: CPT | Mod: S$GLB,,, | Performed by: INTERNAL MEDICINE

## 2020-03-06 NOTE — PROGRESS NOTES
Subjective:      Patient ID: Narciso Mckeon is a 54 y.o. male.    Chief Complaint:      53 yr old gentleman with history of post treatment Graves disease seen in [facility] today    History of Present Illness    Patient is a 53 yr old gentleman with history of Graves disease with hyperthyroidism seen in [facility] today. He had been managed with long term methimazole therapy and had previously been seen on this account with Dr Geovanni Watson and Dr Huerta. He is presently of this medication though and his last TFTs confirmed biochemical euthyroid state.  His most recent thyroid USS from 05/19 showed stable thyroid nodular disease presently not at the threshold for biopsy.  His next thyroid USS should be for ~ 05/20.  Patient has a background epworth score of 9. Patient has polysomogram done ~ 2 yrs ago and   Showed  RLS with loud snoring but no apnea.  He continues to have intermittent neck tightness but no dysphagia nor dyspnea.  No persistent palpitations. He continues to have intermittent excessive tearing in the right eye.  Patient sees Dr Freire on this account.  Patient has been of methimazole now for ~ 2 yrs.  There is no family history of thyroid disease.   Patient was born and raised in [place] [facility] [place]. Patient has no history of residence outside the country.  Patients does does not contain excessive amounts of sea food, cabbage or soy products.  Patient stopped smoking in 2014 but had smoked for 20 yrs before.  Patient drinks ~ 3 beers /week.     The 10-year ASCVD risk score (Joyce JUANITA Jr., et al., 2013) is: 3.7%    Values used to calculate the score:      Age: 52 years      Sex: Male      Is Non- : No      Diabetic: No      Tobacco smoker: No      Systolic Blood Pressure: 117 mmHg      Is BP treated: No      HDL Cholesterol: 50 mg/dL      Total Cholesterol: 189 mg/dL      Patients most recent fibroscan from 05/19 showed F0 and stage 2 hepatic steatosis.  Patient has persistent dry eyes and  "itching. He also has xerosis. He does have intermittent blurring of vision especially in the right eye.       Review of Systems   Constitutional: Negative for chills and diaphoresis.   HENT: Negative for facial swelling, hearing loss and trouble swallowing.    Eyes: Negative for photophobia and visual disturbance.   Respiratory: Negative for cough and shortness of breath.    Cardiovascular: Negative for chest pain, palpitations and leg swelling.   Gastrointestinal: Negative for nausea and vomiting.   Endocrine: Negative for polyphagia and polyuria.   Genitourinary: Negative for difficulty urinating and dysuria.   Musculoskeletal: Positive for back pain (chronic and stable). Negative for arthralgias.   Skin: Negative for color change, pallor and rash.   Neurological: Negative for headaches.   Hematological: Does not bruise/bleed easily.   Psychiatric/Behavioral: Negative for confusion. The patient is not nervous/anxious.        Objective: /82 (BP Location: Left arm, Patient Position: Sitting, BP Method: Medium (Manual))   Pulse 78   Temp 97.9 °F (36.6 °C) (Oral)   Ht 5' 10" (1.778 m)   Wt 105.3 kg (232 lb 2.3 oz)   BMI 33.31 kg/m²  Body surface area is 2.28 meters squared.         Physical Exam   Constitutional: He is oriented to person, place, and time. Vital signs are normal. He appears well-developed and well-nourished.  Non-toxic appearance. No distress.   Pleasant middle aged gentleman. Not pale, anicteric and afebrile. Well hydrated and not in any acute distress.   HENT:   Head: Normocephalic and atraumatic. Head is without right periorbital erythema and without left periorbital erythema. Hair is normal.   Mouth/Throat: No oropharyngeal exudate.   Eyes: Pupils are equal, round, and reactive to light. Conjunctivae, EOM and lids are normal. Lids are everted and swept, no foreign bodies found. Right conjunctiva is not injected. Left conjunctiva is not injected. No scleral icterus.   No proptosis and no " diplopia   Neck: Trachea normal, normal range of motion, full passive range of motion without pain and phonation normal. Neck supple. No JVD present. No tracheal tenderness present. Carotid bruit is not present. No tracheal deviation present. No thyroid mass and no thyromegaly present.   Cardiovascular: Normal rate, regular rhythm, normal heart sounds and normal pulses. Exam reveals no gallop.   No murmur heard.  Pulmonary/Chest: Effort normal and breath sounds normal. No respiratory distress. He has no decreased breath sounds. He has no wheezes. He has no rales.   Abdominal: Soft. Bowel sounds are normal. He exhibits no distension and no mass. There is no hepatosplenomegaly. There is no tenderness.   Musculoskeletal: Normal range of motion. He exhibits no edema or tenderness.   No pedal edema. No digital clubbing nor thyroid acropachy. No pretibial myxedema but has fine tremor of outstretched hands   Lymphadenopathy:     He has no cervical adenopathy.   Neurological: He is alert and oriented to person, place, and time. He has normal strength and normal reflexes. He displays no tremor and normal reflexes. No cranial nerve deficit or sensory deficit. He exhibits normal muscle tone. Gait normal.   Skin: Skin is warm, dry and intact. No bruising, no ecchymosis, no petechiae and no rash noted. He is not diaphoretic. No cyanosis or erythema. No pallor. Nails show no clubbing.   Diffuse xerosis   Psychiatric: He has a normal mood and affect. His speech is normal and behavior is normal. Judgment and thought content normal. His mood appears not anxious. Cognition and memory are normal. He does not exhibit a depressed mood.   Vitals reviewed.      Lab Review:     Results for LIZETTE REILLY (MRN [id number]) as of 3/6/2020 15:01   Ref. Range 9/20/2019 09:47 1/3/2020 11:31 1/3/2020 11:53   Sodium Latest Ref Range: 136 - 145 mmol/L 140  139   Potassium Latest Ref Range: 3.5 - 5.1 mmol/L 4.3  4.9   Chloride Latest Ref  Range: 95 - 110 mmol/L 106  107   CO2 Latest Ref Range: 23 - 29 mmol/L 27  25   Anion Gap Latest Ref Range: 8 - 16 mmol/L 7 (L)  7 (L)   BUN, Bld Latest Ref Range: 6 - 20 mg/dL 20  19   Creatinine Latest Ref Range: 0.5 - 1.4 mg/dL 1.1  1.3   eGFR if non African American Latest Ref Range: >60 mL/min/1.73 m^2 >60  >60.0   eGFR if African American Latest Ref Range: >60 mL/min/1.73 m^2 >60  >60.0   Glucose Latest Ref Range: 70 - 110 mg/dL 106  108   Calcium Latest Ref Range: 8.7 - 10.5 mg/dL 9.3  9.2   Calcium, Ion Latest Ref Range: 1.06 - 1.42 mmol/L 1.25     Alkaline Phosphatase Latest Ref Range: 55 - 135 U/L 94  130   PROTEIN TOTAL Latest Ref Range: 6.0 - 8.4 g/dL 7.3  7.5   Albumin Latest Ref Range: 3.5 - 5.2 g/dL 4.2  3.9   Uric Acid Latest Ref Range: 3.4 - 7.0 mg/dL 7.1 (H)     BILIRUBIN TOTAL Latest Ref Range: 0.1 - 1.0 mg/dL 0.9  1.1 (H)   AST Latest Ref Range: 10 - 40 U/L 26  30   ALT Latest Ref Range: 10 - 44 U/L 42  65 (H)   GGT Latest Ref Range: 8 - 55 U/L 234 (H)     Triglycerides Latest Ref Range: 30 - 150 mg/dL   156 (H)   Cholesterol Latest Ref Range: 120 - 199 mg/dL   191   HDL Latest Ref Range: 40 - 75 mg/dL   43   Hdl/Cholesterol Ratio Latest Ref Range: 20.0 - 50.0 %   22.5   LDL Cholesterol External Latest Ref Range: 63.0 - 159.0 mg/dL   116.8   Non-HDL Cholesterol Latest Units: mg/dL   148   Total Cholesterol/HDL Ratio Latest Ref Range: 2.0 - 5.0    4.4   Vit D, 25-Hydroxy Latest Ref Range: 30 - 96 ng/mL 39     Hemoglobin A1C External Latest Ref Range: 4.0 - 5.6 % 4.7     Estimated Avg Glucose Latest Ref Range: 68 - 131 mg/dL 88     TSH Latest Ref Range: 0.400 - 4.000 uIU/mL 1.605     T3, Total Latest Ref Range: 60 - 180 ng/dL 114     Free T4 Latest Ref Range: 0.71 - 1.51 ng/dL 0.91     Thyroglobulin Interpretation Unknown SEE BELOW     Thyroglobulin Antibody Screen Latest Ref Range: <4.0 IU/mL 24 (H)     Thyroglobulin, Tumor Marker Latest Units: ng/mL 1.4 (H)     PTH Latest Ref Range: 9.0 - 77.0  pg/mL 26.0     PSA Total Latest Ref Range: 0.00 - 4.00 ng/mL 2.0     PSA, Free Latest Ref Range: 0.01 - 1.50 ng/mL 0.33     PSA, Free Pct Latest Ref Range: Not established % 16.50         Assessment:     1. Nodular thyroid disease  US Soft Tissue Head Neck Thyroid   2. NAFLD (nonalcoholic fatty liver disease)  CBC auto differential    Gamma GT   3. Dysmetabolic syndrome  Uric acid   4. Hashimoto's disease  T4, free    T3    Thyroglobulin    TSH    CBC auto differential   5. History of thyrotoxicosis     6. Thyroiditis  T4, free    T3    Thyroglobulin    TSH   7. Graves disease     8. Goiter     9. Hyperlipidemia, unspecified hyperlipidemia type     10. Abnormal thyroid blood test     11. Hypovitaminosis D  Vitamin D    PTH, intact    Calcium, ionized        Regarding Graves disease; appears to be clinical quiescent at the moment as far as thyroid activity. Will recheck full TFTs and thyroid antibody profile.  Regarding thyroid nodular disease; to obtain ffup thyroid USS ~ 05/2020.  Regarding possible opthalmopathy; to continue formal opthalmic referral to evaluate for this.  Depending on the current status as regards antibody titers and presence or absence of orbitopathy may decide on permanent treatment for Graves.  His hx of NAFLD essentially exclude methimazole or PTU use as an option and his recent history of long term smoking makes radio iodine ablation a less than optimal option. Elective thyroidectomy would be the preferred management option for him and I have informed him as much but will await results of latest labs as detailed above.  Regarding NAFLD; ongoing ffup as per hepatology group and rec as regarding ETOH intake as per hepatology team.  Regarding hyperlipidemia; to continue antilipidemic therapy as before.    Plan:       FFup in ~ 6mths.

## 2020-03-07 LAB
25(OH)D3+25(OH)D2 SERPL-MCNC: 36 NG/ML (ref 30–96)
GGT SERPL-CCNC: 241 U/L (ref 8–55)
T3 SERPL-MCNC: 101 NG/DL (ref 60–180)
T4 FREE SERPL-MCNC: 0.9 NG/DL (ref 0.71–1.51)
TSH SERPL DL<=0.005 MIU/L-ACNC: 1.26 UIU/ML (ref 0.4–4)
URATE SERPL-MCNC: 7.5 MG/DL (ref 3.4–7)

## 2020-03-08 ENCOUNTER — PATIENT MESSAGE (OUTPATIENT)
Dept: OPHTHALMOLOGY | Facility: CLINIC | Age: 55
End: 2020-03-08

## 2020-03-09 LAB
THRYOGLOBULIN INTERPRETATION: ABNORMAL
THYROGLOB AB SERPL-ACNC: 24 IU/ML
THYROGLOB SERPL-MCNC: 1.2 NG/ML

## 2020-03-11 ENCOUNTER — OFFICE VISIT (OUTPATIENT)
Dept: OPHTHALMOLOGY | Facility: CLINIC | Age: 55
End: 2020-03-11
Payer: COMMERCIAL

## 2020-03-11 DIAGNOSIS — Z83.518 FAMILY HISTORY OF MACULAR DEGENERATION: ICD-10-CM

## 2020-03-11 DIAGNOSIS — H52.7 REFRACTIVE ERROR: ICD-10-CM

## 2020-03-11 DIAGNOSIS — H02.882 MEIBOMIAN GLAND DYSFUNCTION (MGD) OF RIGHT LOWER EYELID: Primary | ICD-10-CM

## 2020-03-11 DIAGNOSIS — E05.00 GRAVES DISEASE: ICD-10-CM

## 2020-03-11 DIAGNOSIS — H47.391 MYELINATED OPTIC NERVE FIBER LAYER, RIGHT: ICD-10-CM

## 2020-03-11 DIAGNOSIS — H40.039 ANATOMICAL NARROW ANGLE: ICD-10-CM

## 2020-03-11 PROCEDURE — 92012 PR EYE EXAM, EST PATIENT,INTERMED: ICD-10-PCS | Mod: S$GLB,,, | Performed by: OPHTHALMOLOGY

## 2020-03-11 PROCEDURE — 99999 PR PBB SHADOW E&M-EST. PATIENT-LVL II: ICD-10-PCS | Mod: PBBFAC,,, | Performed by: OPHTHALMOLOGY

## 2020-03-11 PROCEDURE — 99999 PR PBB SHADOW E&M-EST. PATIENT-LVL II: CPT | Mod: PBBFAC,,, | Performed by: OPHTHALMOLOGY

## 2020-03-11 PROCEDURE — 92012 INTRM OPH EXAM EST PATIENT: CPT | Mod: S$GLB,,, | Performed by: OPHTHALMOLOGY

## 2020-03-11 RX ORDER — NEOMYCIN SULFATE, POLYMYXIN B SULFATE, AND DEXAMETHASONE 3.5; 10000; 1 MG/G; [USP'U]/G; MG/G
OINTMENT OPHTHALMIC NIGHTLY
Qty: 1 TUBE | Refills: 0 | Status: SHIPPED | OUTPATIENT
Start: 2020-03-11 | End: 2020-03-12 | Stop reason: RX

## 2020-03-11 NOTE — PROGRESS NOTES
HPI     53 yo male here today for RLL red, swollen w/ tenderness x 3 days ago.    +Cold compresses and ATS which caused some drainage.  Pt states symptoms   improved today but still some redness.   No visual complaints.    Last edited by Saira Trivedi on 3/11/2020 11:01 AM. (History)            Assessment /Plan     For exam results, see Encounter Report.    Meibomian gland dysfunction (MGD) of right lower eyelid    Anatomical narrow angle    Family history of macular degeneration    Graves disease    Myelinated optic nerve fiber layer, right    Refractive error    Other orders  -     neomycin-polymyxin-dexamethasone (DEXACINE) 3.5 mg/g-10,000 unit/g-0.1 % Oint; Place into the right eye every evening. for 10 days  Dispense: 1 Tube; Refill: 0        Warm compresses, lid hygiene. Handout given.  Maxitrol margi QHS - can change to gtts BID if margi is still on backorder.    Open to thin TM. Repeat gonioscopy next visit. IOP and C:D remain WNL. Reviewed ACG signs and symptoms.     Mother received injections. Macula currently looks stable    No lagophthalmos. Continue lubrication with artificial tears prn. Denies diplopia, EOM's appear normal. Nataliegina WNL.     Appears to have myelinated nerve fibers inferior disc OD, but not previously documented. Due to Graves, baseline photos taken today 8/9/18. Pt then recalled he had photos on his phone by his friend, Dr. Adkins - baseline photos from 8/9/18 consistent with those. Baseline OCT nerve done 2/5/19.      Follow up in as previously scheduled (around 8/6/2020) for Gonio, IOP check, DFE with 1% only, OCT optic nerve, MRx.

## 2020-03-12 ENCOUNTER — TELEPHONE (OUTPATIENT)
Dept: OPHTHALMOLOGY | Facility: CLINIC | Age: 55
End: 2020-03-12

## 2020-03-12 RX ORDER — NEOMYCIN SULFATE, POLYMYXIN B SULFATE AND DEXAMETHASONE 3.5; 10000; 1 MG/ML; [USP'U]/ML; MG/ML
1 SUSPENSION/ DROPS OPHTHALMIC 2 TIMES DAILY
Qty: 5 ML | Refills: 0 | Status: SHIPPED | OUTPATIENT
Start: 2020-03-12 | End: 2020-03-22

## 2020-03-12 NOTE — TELEPHONE ENCOUNTER
----- Message from Jacqueline Malik sent at 3/12/2020  4:29 PM CDT -----  Contact: [facility]  Please let me know of alternative for pt.     ----- Message -----  From: David Willams  Sent: 3/12/2020   3:22 PM CDT  To: Porsha Boykin Staff    Type:  Pharmacy Calling to Clarify an RX    Name of Caller:  Alex  Pharmacy Name:  [facility] [place] [facility]  Prescription Name:  neomycin-polymyxin-dexamethasone (DEXACINE) 3.5 mg/g-10,000 unit/g-0.1 % Oint  What do they need to clarify?:  Not available / need substitution  Best Call Back Number:  [phone]  Additional Information:

## 2020-03-31 ENCOUNTER — PATIENT MESSAGE (OUTPATIENT)
Dept: OPHTHALMOLOGY | Facility: CLINIC | Age: 55
End: 2020-03-31

## 2020-05-06 ENCOUNTER — HOSPITAL ENCOUNTER (OUTPATIENT)
Dept: RADIOLOGY | Facility: CLINIC | Age: 55
Discharge: HOME | End: 2020-05-06
Attending: INTERNAL MEDICINE
Payer: COMMERCIAL

## 2020-05-06 DIAGNOSIS — E04.1 NODULAR THYROID DISEASE: ICD-10-CM

## 2020-05-06 PROCEDURE — 76536 US EXAM OF HEAD AND NECK: CPT | Mod: TC,PO

## 2020-05-06 PROCEDURE — 76536 US EXAM OF HEAD AND NECK: CPT | Mod: 26,,, | Performed by: RADIOLOGY

## 2020-05-06 PROCEDURE — 76536 US SOFT TISSUE HEAD NECK THYROID: ICD-10-PCS | Mod: 26,,, | Performed by: RADIOLOGY

## 2020-05-18 DIAGNOSIS — Z20.822 CLOSE EXPOSURE TO COVID-19 VIRUS: Primary | ICD-10-CM

## 2020-05-22 ENCOUNTER — TELEPHONE (OUTPATIENT)
Dept: HEPATOLOGY | Facility: CLINIC | Age: 55
End: 2020-05-22

## 2020-05-22 NOTE — TELEPHONE ENCOUNTER
MA spoke to the pt. I was able to schedule him for his labs and us July 6th with his f/u in clinic on the 15th. I also maield him the reminders and updated his new address in our system.    ----- Message from Nini Blevins sent at 5/22/2020  9:15 AM CDT -----  Contact: Anya Mckeon (wife)      ----- Message -----  From: Alicia Kumar  Sent: 5/22/2020   9:07 AM CDT  To: Hepatology Scheduling    Anya (wife) calling to set up appt for pt.    Anya# [phone]

## 2020-05-25 ENCOUNTER — LAB VISIT (OUTPATIENT)
Dept: LAB | Facility: HOSPITAL | Age: 55
End: 2020-05-25
Attending: INTERNAL MEDICINE
Payer: COMMERCIAL

## 2020-05-25 DIAGNOSIS — Z20.822 CLOSE EXPOSURE TO COVID-19 VIRUS: ICD-10-CM

## 2020-05-25 LAB — SARS-COV-2 IGG SERPLBLD QL IA.RAPID: POSITIVE

## 2020-05-25 PROCEDURE — 86769 SARS-COV-2 COVID-19 ANTIBODY: CPT

## 2020-05-25 PROCEDURE — 36415 COLL VENOUS BLD VENIPUNCTURE: CPT

## 2020-07-06 ENCOUNTER — LAB VISIT (OUTPATIENT)
Dept: LAB | Facility: HOSPITAL | Age: 55
End: 2020-07-06
Attending: INTERNAL MEDICINE
Payer: COMMERCIAL

## 2020-07-06 ENCOUNTER — HOSPITAL ENCOUNTER (OUTPATIENT)
Dept: RADIOLOGY | Facility: CLINIC | Age: 55
Discharge: HOME | End: 2020-07-06
Attending: INTERNAL MEDICINE
Payer: COMMERCIAL

## 2020-07-06 DIAGNOSIS — K76.0 NAFLD (NONALCOHOLIC FATTY LIVER DISEASE): ICD-10-CM

## 2020-07-06 DIAGNOSIS — R79.89 ELEVATED FERRITIN LEVEL: ICD-10-CM

## 2020-07-06 LAB
ALBUMIN SERPL BCP-MCNC: 3.8 G/DL (ref 3.5–5.2)
ALP SERPL-CCNC: 84 U/L (ref 55–135)
ALT SERPL W/O P-5'-P-CCNC: 27 U/L (ref 10–44)
ANION GAP SERPL CALC-SCNC: 6 MMOL/L (ref 8–16)
AST SERPL-CCNC: 23 U/L (ref 10–40)
BASOPHILS # BLD AUTO: 0.04 K/UL (ref 0–0.2)
BASOPHILS NFR BLD: 0.8 % (ref 0–1.9)
BILIRUB SERPL-MCNC: 0.6 MG/DL (ref 0.1–1)
BUN SERPL-MCNC: 21 MG/DL (ref 6–20)
CALCIUM SERPL-MCNC: 9.4 MG/DL (ref 8.7–10.5)
CHLORIDE SERPL-SCNC: 107 MMOL/L (ref 95–110)
CO2 SERPL-SCNC: 28 MMOL/L (ref 23–29)
CREAT SERPL-MCNC: 1.1 MG/DL (ref 0.5–1.4)
DIFFERENTIAL METHOD: ABNORMAL
EOSINOPHIL # BLD AUTO: 0.1 K/UL (ref 0–0.5)
EOSINOPHIL NFR BLD: 1.8 % (ref 0–8)
ERYTHROCYTE [DISTWIDTH] IN BLOOD BY AUTOMATED COUNT: 13.8 % (ref 11.5–14.5)
EST. GFR  (AFRICAN AMERICAN): >60 ML/MIN/1.73 M^2
EST. GFR  (NON AFRICAN AMERICAN): >60 ML/MIN/1.73 M^2
FERRITIN SERPL-MCNC: 313 NG/ML (ref 20–300)
GLUCOSE SERPL-MCNC: 114 MG/DL (ref 70–110)
HCT VFR BLD AUTO: 42.2 % (ref 40–54)
HGB BLD-MCNC: 14 G/DL (ref 14–18)
IMM GRANULOCYTES # BLD AUTO: 0.01 K/UL (ref 0–0.04)
IMM GRANULOCYTES NFR BLD AUTO: 0.2 % (ref 0–0.5)
INR PPP: 1 (ref 0.8–1.2)
LYMPHOCYTES # BLD AUTO: 1.6 K/UL (ref 1–4.8)
LYMPHOCYTES NFR BLD: 31.6 % (ref 18–48)
MCH RBC QN AUTO: 34.8 PG (ref 27–31)
MCHC RBC AUTO-ENTMCNC: 33.2 G/DL (ref 32–36)
MCV RBC AUTO: 105 FL (ref 82–98)
MONOCYTES # BLD AUTO: 0.5 K/UL (ref 0.3–1)
MONOCYTES NFR BLD: 9.4 % (ref 4–15)
NEUTROPHILS # BLD AUTO: 2.9 K/UL (ref 1.8–7.7)
NEUTROPHILS NFR BLD: 56.2 % (ref 38–73)
NRBC BLD-RTO: 0 /100 WBC
PLATELET # BLD AUTO: 188 K/UL (ref 150–350)
PMV BLD AUTO: 9.8 FL (ref 9.2–12.9)
POTASSIUM SERPL-SCNC: 4.3 MMOL/L (ref 3.5–5.1)
PROT SERPL-MCNC: 7 G/DL (ref 6–8.4)
PROTHROMBIN TIME: 10.5 SEC (ref 9–12.5)
RBC # BLD AUTO: 4.02 M/UL (ref 4.6–6.2)
SODIUM SERPL-SCNC: 141 MMOL/L (ref 136–145)
WBC # BLD AUTO: 5.09 K/UL (ref 3.9–12.7)

## 2020-07-06 PROCEDURE — 85025 COMPLETE CBC W/AUTO DIFF WBC: CPT

## 2020-07-06 PROCEDURE — 85610 PROTHROMBIN TIME: CPT

## 2020-07-06 PROCEDURE — 76700 US EXAM ABDOM COMPLETE: CPT | Mod: TC,PO

## 2020-07-06 PROCEDURE — 76700 US ABDOMEN COMPLETE: ICD-10-PCS | Mod: 26,,, | Performed by: RADIOLOGY

## 2020-07-06 PROCEDURE — 82105 ALPHA-FETOPROTEIN SERUM: CPT

## 2020-07-06 PROCEDURE — 36415 COLL VENOUS BLD VENIPUNCTURE: CPT | Mod: PO

## 2020-07-06 PROCEDURE — 80053 COMPREHEN METABOLIC PANEL: CPT

## 2020-07-06 PROCEDURE — 76700 US EXAM ABDOM COMPLETE: CPT | Mod: 26,,, | Performed by: RADIOLOGY

## 2020-07-06 PROCEDURE — 82728 ASSAY OF FERRITIN: CPT

## 2020-07-07 LAB — AFP SERPL-MCNC: 4.5 NG/ML (ref 0–8.4)

## 2020-07-15 ENCOUNTER — TELEPHONE (OUTPATIENT)
Dept: HEPATOLOGY | Facility: CLINIC | Age: 55
End: 2020-07-15

## 2020-07-15 ENCOUNTER — OFFICE VISIT (OUTPATIENT)
Dept: HEPATOLOGY | Facility: CLINIC | Age: 55
End: 2020-07-15
Payer: COMMERCIAL

## 2020-07-15 ENCOUNTER — PROCEDURE VISIT (OUTPATIENT)
Dept: HEPATOLOGY | Facility: CLINIC | Age: 55
End: 2020-07-15
Payer: COMMERCIAL

## 2020-07-15 ENCOUNTER — LAB VISIT (OUTPATIENT)
Dept: LAB | Facility: HOSPITAL | Age: 55
End: 2020-07-15
Payer: COMMERCIAL

## 2020-07-15 VITALS
BODY MASS INDEX: 33.46 KG/M2 | OXYGEN SATURATION: 98 % | SYSTOLIC BLOOD PRESSURE: 141 MMHG | WEIGHT: 233.69 LBS | HEIGHT: 70 IN | DIASTOLIC BLOOD PRESSURE: 85 MMHG | HEART RATE: 74 BPM

## 2020-07-15 DIAGNOSIS — K76.0 NAFLD (NONALCOHOLIC FATTY LIVER DISEASE): ICD-10-CM

## 2020-07-15 DIAGNOSIS — K76.0 NAFLD (NONALCOHOLIC FATTY LIVER DISEASE): Primary | ICD-10-CM

## 2020-07-15 DIAGNOSIS — E66.9 OBESITY (BMI 30-39.9): ICD-10-CM

## 2020-07-15 DIAGNOSIS — K74.00 LIVER FIBROSIS: ICD-10-CM

## 2020-07-15 DIAGNOSIS — Z78.9 ALCOHOL USE: ICD-10-CM

## 2020-07-15 DIAGNOSIS — E78.5 HYPERLIPIDEMIA, UNSPECIFIED HYPERLIPIDEMIA TYPE: ICD-10-CM

## 2020-07-15 PROCEDURE — 3008F BODY MASS INDEX DOCD: CPT | Mod: CPTII,S$GLB,, | Performed by: PHYSICIAN ASSISTANT

## 2020-07-15 PROCEDURE — 86706 HEP B SURFACE ANTIBODY: CPT

## 2020-07-15 PROCEDURE — 91200 FIBROSCAN (VIBRATION CONTROLLED TRANSIENT ELASTOGRAPHY): ICD-10-PCS | Mod: S$GLB,,, | Performed by: PHYSICIAN ASSISTANT

## 2020-07-15 PROCEDURE — 3008F PR BODY MASS INDEX (BMI) DOCUMENTED: ICD-10-PCS | Mod: CPTII,S$GLB,, | Performed by: PHYSICIAN ASSISTANT

## 2020-07-15 PROCEDURE — 91200 LIVER ELASTOGRAPHY: CPT | Mod: S$GLB,,, | Performed by: PHYSICIAN ASSISTANT

## 2020-07-15 PROCEDURE — 36415 COLL VENOUS BLD VENIPUNCTURE: CPT

## 2020-07-15 PROCEDURE — 99999 PR PBB SHADOW E&M-EST. PATIENT-LVL V: CPT | Mod: PBBFAC,,, | Performed by: PHYSICIAN ASSISTANT

## 2020-07-15 PROCEDURE — 99215 PR OFFICE/OUTPT VISIT, EST, LEVL V, 40-54 MIN: ICD-10-PCS | Mod: S$GLB,,, | Performed by: PHYSICIAN ASSISTANT

## 2020-07-15 PROCEDURE — 86790 VIRUS ANTIBODY NOS: CPT

## 2020-07-15 PROCEDURE — 99999 PR PBB SHADOW E&M-EST. PATIENT-LVL V: ICD-10-PCS | Mod: PBBFAC,,, | Performed by: PHYSICIAN ASSISTANT

## 2020-07-15 PROCEDURE — 99215 OFFICE O/P EST HI 40 MIN: CPT | Mod: S$GLB,,, | Performed by: PHYSICIAN ASSISTANT

## 2020-07-15 NOTE — PROGRESS NOTES
[facility] - Established Patient     Last Clinic Visit: 5/15/2019 with Dr. Narciso Pickens    CHIEF COMPLAINT: Fatty liver     HPI: This is a 54 y.o. White male referred for continued evaluation of NAFLD. The patient was previously followed by Dr. Narciso Pickens, is new to me today.    I reviewed his medical records at length:  Initial referral was made in 2016 for elevated GGT and Alkphos; GGT, has come down but not normalized.   Followed for fatty liver w/RF of obesity, HLD, etoh use.   Serial ultrasounds without hepatosplenomegaly or   Prior serologic workup negative for genetic, autoimmune or viral causes of hepatic disease.   Prior fibroscans showed F0-1; S1   Never has had synthetic dysfunction or thrombocytopenia  Was instructed to cut back on alcohol and lose weight at prior visits Body mass index is 33.53 kg/m².      Most recent labs show normal liver enzymes; US unremarkable.   However, continued mild elevation of ferritin, MCV. The patient has had a positive PETH in the past, and from review of previous medical records and visits, l suspect this is due to alcohol. Do not suspect HH.    Hepatitis immunization status: unknown    Interval history:   He is here today with his wife, an RN who works for [employer]. The patient feels well, denies symptoms of hepatic decompensation including jaundice, ascites, cognitive problems to suggest hepatic encephalopathy, or GI bleeding. No other complaints today; ROS below.    Has not had improvement with weight loss, but admits he has not really been attempting to. 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 Admits to alcohol use and candy. Drinks a ton of juices and Gatorades/Powerades. Recently eating more salads with wife.     Reports he is now hypothyroid after previously being hyperthyroid. Currently not on any thyroid rx.    Occupation: contractor; prior work includes owner of bar       Review  of patient's allergies indicates:   Allergen Reactions    Bactrim [sulfamethoxazole-trimethoprim]      Possibly allergic reaction        Current Outpatient Medications on File Prior to Visit   Medication Sig Dispense Refill    aspirin (ECOTRIN) 81 MG EC tablet TAKE ONE TABLET BY MOUTH ONCE DAILY 90 tablet 3    b complex vitamins tablet Take 1 tablet by mouth once daily.      betamethasone dipropionate (DIPROLENE) 0.05 % ointment AAA hand BID PRN flare 45 g 1    clobetasol (TEMOVATE) 0.05 % cream Thin film to AA hands BID PRN flare 45 g 1    gabapentin (NEURONTIN) 300 MG capsule Take 1 capsule (300 mg total) by mouth 3 (three) times daily. 90 capsule 0    meclizine (ANTIVERT) 25 mg tablet Take 1 tablet (25 mg total) by mouth 3 (three) times daily as needed. 30 tablet 1    meloxicam (MOBIC) 15 MG tablet Take 15 mg by mouth once daily.      morphine (MS CONTIN) 15 MG 12 hr tablet Take 15 mg by mouth as needed.      multivitamin capsule Take 1 capsule by mouth once daily.      oxyCODONE (ROXICODONE) 15 MG Tab Take 15 mg by mouth daily as needed.      rosuvastatin (CRESTOR) 10 MG tablet TAKE ONE TABLET BY MOUTH once a day 90 tablet 3    tizanidine (ZANAFLEX) 4 MG tablet Take 2 mg by mouth every 8 (eight) hours.       clonazePAM (KLONOPIN) 0.5 MG tablet Take 1 tablet (0.5 mg total) by mouth nightly as needed for Anxiety. 30 tablet 0    [DISCONTINUED] amoxicillin (AMOXIL) 500 MG capsule        No current facility-administered medications on file prior to visit.        PMHX:  has a past medical history of Arthritis, Chronic neck and back pain, DDD (degenerative disc disease), cervical, Graves disease, and Hyperthyroidism (11/18/2015).    PSHX:  has a past surgical history that includes Knee surgery (Left, 01/2015); Finger surgery; and Colonoscopy (N/A, 9/5/2018).    FAMILY HISTORY:   Family History   Problem Relation Age of Onset    Heart disease Father 52        MI    Macular degeneration Mother      Melanoma Mother     Lupus Neg Hx     Eczema Neg Hx     Psoriasis Neg Hx     Glaucoma Neg Hx     Retinal detachment Neg Hx        SOCIAL HISTORY:   Social History     Tobacco Use   Smoking Status Former Smoker    Quit date: 2014    Years since quittin.7   Smokeless Tobacco Never Used       Social History     Substance and Sexual Activity   Alcohol Use Yes    Comment: occas       Social History     Substance and Sexual Activity   Drug Use No       Review of Systems   Constitutional: Negative for chills, fever and unexpected weight change.   Respiratory: Negative for cough and shortness of breath.    Cardiovascular: Negative for chest pain, palpitations and leg swelling.   Gastrointestinal: Negative for abdominal distention, abdominal pain, blood in stool, constipation, diarrhea, nausea and vomiting. No change in stool color.  Genitourinary: Negative for dysuria and hematuria. Denies dark urine.   Musculoskeletal: Negative for gait problem and myalgias. Reports intermittent L knee pain.   Skin: Negative for color change, rash and wound.   Psychiatric/Behavioral: Negative for confusion, decreased concentration and sleep disturbance.The patient is not nervous/anxious.        DATA     Physical Exam   Constitutional: Friendly White male. Well-nourished. No distress. Alert and oriented to person, place, and time.  Eyes: No scleral icterus.   Cardiovascular: Normal rate, regular rhythm and normal heart sounds. No murmur heard.  Pulmonary/Chest: Respiratory effort and breath sounds normal. No respiratory distress.   Abdominal: Soft, non-tender. Bowel sounds are normal. No distension; no ascites appreciated. There is no palpable hepatosplenomegaly. No hernia or mass.   Musculoskeletal: No edema.   Neurological: No tremor. Coordination and gait normal. No asterixis.     Skin: Skin is warm and dry. No jaundice. No telangiectasias or palmar erythema noted.  Psychiatric: Normal mood and affect. Speech, behavior,  "and thought content normal.     BP (!) 141/85 (BP Location: Right arm, Patient Position: Sitting, BP Method: Medium (Automatic))   Pulse 74   Ht 5' 10" (1.778 m)   Wt 106 kg (233 lb 11 oz)   SpO2 98%   BMI 33.53 kg/m²     LABS:  Lab Results   Component Value Date    WBC 5.09 07/06/2020    HGB 14.0 07/06/2020    HCT 42.2 07/06/2020     07/06/2020     07/06/2020    K 4.3 07/06/2020    CREATININE 1.1 07/06/2020    ALT 27 07/06/2020    AST 23 07/06/2020    ALKPHOS 84 07/06/2020    BILITOT 0.6 07/06/2020    BILIDIR 0.3 02/22/2016    ALBUMIN 3.8 07/06/2020    INR 1.0 07/06/2020    AFP 4.5 07/06/2020    SMOOTHMUSCAB Positive 1:80 (A) 08/24/2016    AMAIFA Negative 1:40 08/09/2016    IGGSERUM 1016 08/09/2016    ANASCREEN Negative <1:160 03/12/2018    FERRITIN 313 (H) 07/06/2020    FESATURATED 25 08/09/2016    PETH Negative 06/01/2018    WAFVN3QONHIF 153 04/01/2016    CERULOPLSM 23.0 03/12/2018    CHOL 191 01/03/2020    TRIG 156 (H) 01/03/2020    LDLCALC 116.8 01/03/2020    HGBA1C 4.7 09/20/2019       No results found for: HEPAIGG    Hepatitis B Surface Ag   Date Value Ref Range Status   08/10/2016 Negative  Final     No results found for: HEPBCAB  No results found for: HEPBSAB  Hepatitis C Ab   Date Value Ref Range Status   08/10/2016 Negative  Final     Immunization History   Administered Date(s) Administered    Influenza - Quadrivalent 10/27/2016    Influenza - Quadrivalent - PF (6 months and older) 11/05/2015          DIAGNOSTIC STUDIES:  ABDOMINAL Ultrasound -   Results for orders placed during the hospital encounter of 07/06/20   US Abdomen Complete    Narrative EXAMINATION:  US ABDOMEN COMPLETE    CLINICAL HISTORY:  NAFLD;.    Fatty (change of) liver, not elsewhere classified    TECHNIQUE:  Complete ultrasound evaluation of the abdomen (including the pancreas, aorta, IVC, liver gallbladder, common bile duct,kidneys, and spleen) was performed utilizing grayscale and color flow " imaging.    COMPARISON:  None.    FINDINGS:  Pancreas: The pancreas is normal in echogenicity without focal mass or definite pseudocyst where visualized.    Aorta: The visualized abdominal aorta is normal is caliber without evidence of aneurysm.  The proximal abdominal aorta is obscured by bowel gas.    Liver: The liver is normal in size measuring 14.9 cm in sagittal dimension.  The liver demonstrates a normal homogeneous parenchymal echotexture.  No sonographic evidence of fatty infiltration of the liver..  No focal hepatic lesions are seen. The portal vein is patent and shows normal directional flow.The IVC is patent where visualized.    Biliary system: The gallbladder shows no evidence of shadowing stones, distension, pericholecystic fluid, or sonographic Tapia sign. The common duct is not dilated, measuring 2 mm. No intrahepatic ductal dilatation.    Kidneys: The kidneys are normal in size measuring 10.8 cm on the right and 11.2 cm on the left.No hydronephrosis, stones, or renal mass.    Spleen: The spleen is enlarged measuring 13.1 x 5.2 cm but shows a normal homogenous echotexture without solid mass.    Miscellaneous: No ascites.      Impression Mild splenomegaly..      Electronically signed by: Tarun Burrows MD  Date:    07/06/2020  Time:    09:20         Fibroscan today: F2, S1    EDUCATION / DISCUSSION:      There is no FDA approved therapy for non-alcoholic fatty liver disease. Therefore, these things are important:  1. Weight loss   2. Low carb/sugar, high fiber and protein diet.Try to limit your carb intake to LESS than 30-45 grams of carbs with a meal or LESS than 5-10 grams with any snack (total of any snack foods eaten during that time). Use [employer] derick to add up your carbs through the day. Do NOT drink any beverages with calories or carbs (this can lead to high blood sugar and weight gain). Also, some of our patients have been very successful with weight loss using the pre made/planned meal  planning services that limit calories and portion size (one example is Sensible Meals but there are many out there)  3. Exercise, 5 days per week, 30 minutes per day, as tolerated  4. Recommend good cholesterol, blood pressure, blood sugar levels     *If statins are being considered for HLD, statins are safe in patients with liver disease. Statins can be used to treat dyslipidemia in patients with both NAFLD and FONSECA.    In some people, fatty liver can progress to steatohepatitis (inflamatory fatty liver) and possibly to cirrhosis, putting one at increased risk for liver cancer, liver failure, and death. Therefore, the lifestyle changes are very important to decrease this risk.     Website with information about fatty liver and inflammation related to fatty liver (FONSECA) = www.nashtruth.BCNX  AND www.NASHactually.com    Tips for low/moderate carbohydrate diet:  3 meals a day made up of the following:  -- Unlimited green vegetables, tomatoes, mushrooms, spaghetti squash, cauliflower, meat, poultry, seafood, eggs and hard cheeses.   -- Milk and plain yogurt  -- Dressings, seasonings, condiments, etc should have less than 2 g sugars.   -- Beans (1-1.5 cups) or nuts (1/4 cup): can have 1 x a day.   -- 1-2 servings of citrus fruits, berries, pineapple or melon a day (1/2 cup)  -- Avoid fried foods    *No grains, rice, pasta, potatoes, bread, corn, peas, oatmeal, grits, tortillas, crackers, chips    *No soda, sweet tea, juices or lemonade    Try www.dietdoctor.BCNX for recipes (moderate carb intake)        ASSESSMENT & PLAN     54 y.o. White male with:    1. Fatty liver with F2 liver fibrosis, likely related to alcohol and metabolic risk factors  -- Fibroscan today showed increase in liver fibrosis to F2 today, S1. This has progressed since last year.  -- US 7/2020 stable from previous   -- transaminases wnl recently; synthetic liver function wnl  -- risk factors for fatty liver disease include obesity, HLD, etoh use   --  Immunity to Hep A and B : Will check immunity markers for HBV/HAV and arrange for vaccination if needed  -- Fibroscan today  -- Recommendations discussed with patient as above  -- Encouraged weight loss and decrease ETOH intake     2. Obesity, HLD  -- Body mass index is 33.53 kg/m².; counseled on weight loss and diet choices thoroughly  -- increases risk for fatty liver  -- continue statin    3. Elevated ferritin w/MCV increased and RBC decreased; without anemia   -- likely secondary to alcohol       4. Alcohol use, mild to moderate, without reported dependence   -- prior PETH positive, endorses use of ETOH     Social History     Substance and Sexual Activity   Alcohol Use Yes    Alcohol/week: 2.0 - 3.0 standard drinks    Types: 2 - 3 Cans of beer per week    Comment: 2 times a week            Orders Placed This Encounter   Procedures    FibroScan (Vibration Controlled Transient Elastography)    Hepatitis B Surface Ab, Qualitative    Hepatitis A antibody, IgG         Total duration of visit = 60 min, with > 50% spent counseling      · Fibroscan showed increased fibrosis to F2 today from F1 last year. Must work on diet, weight loss, minimizing alcohol.  · We discussed fatty liver and its progression at length today. The patient is aware that fatty liver may lead to liver fibrosis, cirrhosis; counseling as per above.  · We discussed alcoholintake and diet at length today. Encouraged low carb, high protein diet. Encouraged cessation of sugary drinks including gatorade, powerade, soft drinks, and juices. Encouraged optimal water intake. Resources given on AVS for nutrition.   · All questions were answered to patient and wife's satisfaction.    · Will screen for hep A & B immunization today, will send order for vaccine in if needed  · F/u in 1 year with US & HFP prior to visit; in addition, weight loss goal pf 23 lbs and cutting back alcohol       Thank you for allowing me to participate in the care of Narciso Metcalf  JAEL JuneC  Hepatology   oral

## 2020-07-15 NOTE — PROCEDURES
FibroScan (Vibration Controlled Transient Elastography)    Date/Time: 7/15/2020 4:30 PM  Performed by: Abi Bolivar PA-C  Authorized by: Abi Bolivar PA-C     Diagnosis:  NAFLD and Alcohol    Probe:  M    Universal Protocol: Patient's identity, procedure and site were verified, confirmatory pause was performed.  Discussed procedure including risks and potential complications.  Questions answered.  Patient verbalizes understanding and wishes to proceed with VCTE.     Procedure: After providing explanations of the procedure, patient was placed in the supine position with right arm in maximum abduction to allow optimal exposure of right lateral abdomen.  Patient was briefly assessed, Testing was performed in the mid-axillary location, 50Hz Shear Wave pulses were applied and the resulting Shear Wave and Propagation Speed detected with a 3.5 MHz ultrasonic signal, using the FibroScan probe, Skin to liver capsule distance and liver parenchyma were accessed during the entire examination with the FibroScan probe, Patient was instructed to breathe normally and to abstain from sudden movements during the procedure, allowing for random measurements of liver stiffness. At least 10 Shear Waves were produced, Individual measurements of each Shear Wave were calculated.  Patient tolerated the procedure well with no complications.  Meets discharge criteria as was dismissed.  Rates pain 0 out of 10.  Patient will follow up with ordering provider to review results.      Findings  Median liver stiffness score:  9.9  CAP Reading: dB/m:  253    IQR/med %:  18  Interpretation  Fibrosis interpretation is based on medial liver stiffness - Kilopascal (kPa).    Fibrosis Stage:  F2  Steatosis interpretation is based on controlled attenuation parameter - (dB/m).    Steatosis Grade:  S1

## 2020-07-15 NOTE — PATIENT INSTRUCTIONS
There is no FDA approved therapy for non-alcoholic fatty liver disease. Therefore, these things are important:  1. Weight loss  2. Low carb/sugar, high fiber and protein diet.Try to limit your carb intake to LESS than 30-45 grams of carbs with a meal or LESS than 5-10 grams with any snack (total of any snack foods eaten during that time). Use [employer] derick to add up your carbs through the day. Do NOT drink any beverages with calories or carbs (this can lead to high blood sugar and weight gain). Also, some of our patients have been very successful with weight loss using the pre made/planned meal planning services that limit calories and portion size (one example is Sensible Meals but there are many out there)  3. Exercise, 5 days per week, 30 minutes per day, as tolerated  4. Recommend good cholesterol, blood pressure, blood sugar levels     *If statins are being considered for HLD, statins are safe in patients with liver disease. Statins can be used to treat dyslipidemia in patients with both NAFLD and FONSECA.    In some people, fatty liver can progress to steatohepatitis (inflamatory fatty liver) and possibly to cirrhosis, putting one at increased risk for liver cancer, liver failure, and death. Therefore, the lifestyle changes are very important to decrease this risk.     Website with information about fatty liver and inflammation related to fatty liver (FONSECA) = www.nashtruth.[employer]  AND www.NASHactually.com    Tips for low/moderate carbohydrate diet:  3 meals a day made up of the following:  -- Unlimited green vegetables, tomatoes, mushrooms, spaghetti squash, cauliflower, meat, poultry, seafood, eggs and hard cheeses.   -- Milk and plain yogurt  -- Dressings, seasonings, condiments, etc should have less than 2 g sugars.   -- Beans (1-1.5 cups) or nuts (1/4 cup): can have 1 x a day.   -- 1-2 servings of citrus fruits, berries, pineapple or melon a day (1/2 cup)  -- Avoid fried foods    *No grains, rice, pasta,  potatoes, bread, corn, peas, oatmeal, grits, tortillas, crackers, chips    *No soda, sweet tea, juices or lemonade    Try www.dietdoctor.19pay for recipes (moderate carb intake)

## 2020-07-16 ENCOUNTER — PATIENT MESSAGE (OUTPATIENT)
Dept: HEPATOLOGY | Facility: CLINIC | Age: 55
End: 2020-07-16

## 2020-07-16 DIAGNOSIS — Z23 NEED FOR HEPATITIS A AND B VACCINATION: Primary | ICD-10-CM

## 2020-07-16 LAB
HBV SURFACE AB SER-ACNC: NEGATIVE M[IU]/ML
HEPATITIS A ANTIBODY, IGG: NEGATIVE

## 2020-08-17 ENCOUNTER — OFFICE VISIT (OUTPATIENT)
Dept: ENDOCRINOLOGY | Facility: CLINIC | Age: 55
End: 2020-08-17
Payer: COMMERCIAL

## 2020-08-17 ENCOUNTER — LAB VISIT (OUTPATIENT)
Dept: LAB | Facility: HOSPITAL | Age: 55
End: 2020-08-17
Attending: INTERNAL MEDICINE
Payer: COMMERCIAL

## 2020-08-17 VITALS
BODY MASS INDEX: 31.68 KG/M2 | DIASTOLIC BLOOD PRESSURE: 78 MMHG | TEMPERATURE: 98 F | SYSTOLIC BLOOD PRESSURE: 140 MMHG | HEART RATE: 98 BPM | WEIGHT: 221.25 LBS | HEIGHT: 70 IN

## 2020-08-17 DIAGNOSIS — E78.5 HYPERLIPIDEMIA, UNSPECIFIED HYPERLIPIDEMIA TYPE: ICD-10-CM

## 2020-08-17 DIAGNOSIS — E06.9 THYROIDITIS: ICD-10-CM

## 2020-08-17 DIAGNOSIS — K76.0 NAFLD (NONALCOHOLIC FATTY LIVER DISEASE): ICD-10-CM

## 2020-08-17 DIAGNOSIS — E04.1 NODULAR THYROID DISEASE: Primary | ICD-10-CM

## 2020-08-17 DIAGNOSIS — E88.810 DYSMETABOLIC SYNDROME: ICD-10-CM

## 2020-08-17 DIAGNOSIS — E06.3 HASHIMOTO'S DISEASE: ICD-10-CM

## 2020-08-17 DIAGNOSIS — R73.9 HYPERGLYCEMIA: ICD-10-CM

## 2020-08-17 DIAGNOSIS — E78.00 HYPERCHOLESTEROLEMIA: ICD-10-CM

## 2020-08-17 DIAGNOSIS — E55.9 HYPOVITAMINOSIS D: ICD-10-CM

## 2020-08-17 DIAGNOSIS — Z86.39 HISTORY OF GRAVES' DISEASE: ICD-10-CM

## 2020-08-17 DIAGNOSIS — E04.1 NODULAR THYROID DISEASE: ICD-10-CM

## 2020-08-17 DIAGNOSIS — E66.9 OBESITY (BMI 30-39.9): ICD-10-CM

## 2020-08-17 PROCEDURE — 99999 PR PBB SHADOW E&M-EST. PATIENT-LVL III: CPT | Mod: PBBFAC,,, | Performed by: INTERNAL MEDICINE

## 2020-08-17 PROCEDURE — 84432 ASSAY OF THYROGLOBULIN: CPT

## 2020-08-17 PROCEDURE — 99214 OFFICE O/P EST MOD 30 MIN: CPT | Mod: S$GLB,,, | Performed by: INTERNAL MEDICINE

## 2020-08-17 PROCEDURE — 83036 HEMOGLOBIN GLYCOSYLATED A1C: CPT

## 2020-08-17 PROCEDURE — 84480 ASSAY TRIIODOTHYRONINE (T3): CPT

## 2020-08-17 PROCEDURE — 99214 PR OFFICE/OUTPT VISIT, EST, LEVL IV, 30-39 MIN: ICD-10-PCS | Mod: S$GLB,,, | Performed by: INTERNAL MEDICINE

## 2020-08-17 PROCEDURE — 99999 PR PBB SHADOW E&M-EST. PATIENT-LVL III: ICD-10-PCS | Mod: PBBFAC,,, | Performed by: INTERNAL MEDICINE

## 2020-08-17 PROCEDURE — 84439 ASSAY OF FREE THYROXINE: CPT

## 2020-08-17 PROCEDURE — 36415 COLL VENOUS BLD VENIPUNCTURE: CPT | Mod: PO

## 2020-08-17 PROCEDURE — 84443 ASSAY THYROID STIM HORMONE: CPT

## 2020-08-17 PROCEDURE — 84550 ASSAY OF BLOOD/URIC ACID: CPT

## 2020-08-17 PROCEDURE — 80061 LIPID PANEL: CPT

## 2020-08-17 PROCEDURE — 3008F BODY MASS INDEX DOCD: CPT | Mod: CPTII,S$GLB,, | Performed by: INTERNAL MEDICINE

## 2020-08-17 PROCEDURE — 3008F PR BODY MASS INDEX (BMI) DOCUMENTED: ICD-10-PCS | Mod: CPTII,S$GLB,, | Performed by: INTERNAL MEDICINE

## 2020-08-17 NOTE — PROGRESS NOTES
Subjective:      Patient ID: Narciso Mckeon is a 54 y.o. male.    Chief Complaint:      Patient is a 54 yr old gentleman with history of post treatment Graves disease seen in [facility] today    History of Present Illness    Patient is a 54 yr old gentleman with history of Graves disease with hyperthyroidism seen in [facility] today. He had been managed with long term methimazole therapy and had previously been seen on this account with Dr Geovanni Watson and Dr Huerta. He is presently of this medication though and his last TFTs confirmed biochemical euthyroid state.  His most recent thyroid USS from 05/19 showed stable thyroid nodular disease presently not at the threshold for biopsy.  His next thyroid USS should be for ~ 05/20.  Patient has a background epworth score of 9. Patient has polysomogram done ~ 2 yrs ago and   Showed  RLS with loud snoring but no apnea.  He continues to have intermittent neck tightness but no dysphagia nor dyspnea.  No persistent palpitations. He continues to have intermittent excessive tearing in the right eye.  Patient sees Dr Freire on this account.  Patient has been of methimazole now for ~ 2 yrs.  There is no family history of thyroid disease.   Patient was born and raised in [facility]. Patient has no history of residence outside the country.  Patients does does not contain excessive amounts of sea food, cabbage or soy products.  Patient stopped smoking in 2014 but had smoked for 20 yrs before.  Patient drinks ~ 3 beers /week.     The 10-year ASCVD risk score ([place] JUANITA Jr., et al., 2013) is: 3.7%    Values used to calculate the score:      Age: 52 years      Sex: Male      Is Non- : No      Diabetic: No      Tobacco smoker: No      Systolic Blood Pressure: 117 mmHg      Is BP treated: No      HDL Cholesterol: 50 mg/dL      Total Cholesterol: 189 mg/dL      Patients most recent fibroscan from 05/19 showed F0 and stage 2 hepatic steatosis.  Patient has persistent dry  "eyes and itching. He also has xerosis. He does have intermittent blurring of vision especially in the right eye.    Patient has lost ~ 17lbs compared to last year.    Review of Systems   Constitutional: Negative for chills and diaphoresis.   HENT: Negative for facial swelling, hearing loss and trouble swallowing.    Eyes: Negative for photophobia and visual disturbance.   Respiratory: Negative for cough and shortness of breath.    Cardiovascular: Negative for chest pain, palpitations and leg swelling.   Gastrointestinal: Negative for nausea and vomiting.   Endocrine: Negative for polyphagia and polyuria.   Genitourinary: Negative for difficulty urinating and dysuria.   Musculoskeletal: Positive for back pain (chronic and stable). Negative for arthralgias.   Skin: Negative for color change, pallor and rash.   Neurological: Negative for headaches.   Hematological: Does not bruise/bleed easily.   Psychiatric/Behavioral: Negative for confusion. The patient is not nervous/anxious.        Objective: BP (!) 140/78 (BP Location: Right arm, Patient Position: Sitting, BP Method: Large (Manual))   Pulse 98   Temp 98.2 °F (36.8 °C) (Temporal)   Ht 5' 10" (1.778 m)   Wt 100.4 kg (221 lb 3.7 oz)   BMI 31.74 kg/m²  Body surface area is 2.23 meters squared.         Physical Exam  Vitals signs reviewed.   Constitutional:       General: He is not in acute distress.     Appearance: He is well-developed. He is not toxic-appearing or diaphoretic.      Comments: Pleasant middle aged gentleman. Not pale, anicteric and afebrile. Well hydrated and not in any acute distress.   HENT:      Head: Normocephalic and atraumatic. No right periorbital erythema or left periorbital erythema. Hair is normal.      Mouth/Throat:      Pharynx: No oropharyngeal exudate.   Eyes:      General: Lids are normal. Lids are everted, no foreign bodies appreciated. No scleral icterus.     Conjunctiva/sclera: Conjunctivae normal.      Right eye: Right " conjunctiva is not injected.      Left eye: Left conjunctiva is not injected.      Pupils: Pupils are equal, round, and reactive to light.      Comments: No proptosis and no diplopia   Neck:      Musculoskeletal: Full passive range of motion without pain, normal range of motion and neck supple.      Thyroid: No thyroid mass or thyromegaly.      Vascular: No carotid bruit or JVD.      Trachea: Trachea and phonation normal. No tracheal tenderness or tracheal deviation.   Cardiovascular:      Rate and Rhythm: Normal rate and regular rhythm.      Pulses: Normal pulses.      Heart sounds: Normal heart sounds. No murmur. No gallop.    Pulmonary:      Effort: Pulmonary effort is normal. No respiratory distress.      Breath sounds: Normal breath sounds. No decreased breath sounds, wheezing or rales.   Abdominal:      General: Bowel sounds are normal. There is no distension.      Palpations: Abdomen is soft. There is no mass.      Tenderness: There is no abdominal tenderness.   Musculoskeletal: Normal range of motion.         General: No tenderness.      Comments: No pedal edema. No digital clubbing nor thyroid acropachy. No pretibial myxedema but has fine tremor of outstretched hands   Lymphadenopathy:      Cervical: No cervical adenopathy.   Skin:     General: Skin is warm and dry.      Coloration: Skin is not pale.      Findings: No bruising, ecchymosis, erythema, petechiae or rash.      Nails: There is no clubbing.        Comments: Diffuse xerosis   Neurological:      Mental Status: He is alert and oriented to person, place, and time.      Cranial Nerves: No cranial nerve deficit.      Sensory: No sensory deficit.      Motor: No tremor or abnormal muscle tone.      Gait: Gait normal.      Deep Tendon Reflexes: Reflexes are normal and symmetric. Reflexes normal.   Psychiatric:         Mood and Affect: Mood is not anxious or depressed.         Speech: Speech normal.         Behavior: Behavior normal.         Thought  Content: Thought content normal.         Judgment: Judgment normal.         Lab Review:     Results for LIZETTE REILLY (MRN [id number]) as of 8/17/2020 14:25   Ref. Range 5/25/2020 16:22 7/6/2020 08:58 7/6/2020 08:59 7/15/2020 00:00 7/15/2020 16:46   WBC Latest Ref Range: 3.90 - 12.70 K/uL  5.09      RBC Latest Ref Range: 4.60 - 6.20 M/uL  4.02 (L)      Hemoglobin Latest Ref Range: 14.0 - 18.0 g/dL  14.0      Hematocrit Latest Ref Range: 40.0 - 54.0 %  42.2      MCV Latest Ref Range: 82 - 98 fL  105 (H)      MCH Latest Ref Range: 27.0 - 31.0 pg  34.8 (H)      MCHC Latest Ref Range: 32.0 - 36.0 g/dL  33.2      RDW Latest Ref Range: 11.5 - 14.5 %  13.8      Platelets Latest Ref Range: 150 - 350 K/uL  188      MPV Latest Ref Range: 9.2 - 12.9 fL  9.8      Gran% Latest Ref Range: 38.0 - 73.0 %  56.2      Gran # (ANC) Latest Ref Range: 1.8 - 7.7 K/uL  2.9      Lymph% Latest Ref Range: 18.0 - 48.0 %  31.6      Lymph # Latest Ref Range: 1.0 - 4.8 K/uL  1.6      Mono% Latest Ref Range: 4.0 - 15.0 %  9.4      Mono # Latest Ref Range: 0.3 - 1.0 K/uL  0.5      Eosinophil% Latest Ref Range: 0.0 - 8.0 %  1.8      Eos # Latest Ref Range: 0.0 - 0.5 K/uL  0.1      Basophil% Latest Ref Range: 0.0 - 1.9 %  0.8      Baso # Latest Ref Range: 0.00 - 0.20 K/uL  0.04      nRBC Latest Ref Range: 0 /100 WBC  0      Differential Method Unknown  Automated      Immature Grans (Abs) Latest Ref Range: 0.00 - 0.04 K/uL  0.01      Immature Granulocytes Latest Ref Range: 0.0 - 0.5 %  0.2      Ferritin Latest Ref Range: 20.0 - 300.0 ng/mL  313 (H)      Protime Latest Ref Range: 9.0 - 12.5 sec  10.5      INR Latest Ref Range: 0.8 - 1.2   1.0      Sodium Latest Ref Range: 136 - 145 mmol/L  141      Potassium Latest Ref Range: 3.5 - 5.1 mmol/L  4.3      Chloride Latest Ref Range: 95 - 110 mmol/L  107      CO2 Latest Ref Range: 23 - 29 mmol/L  28      Anion Gap Latest Ref Range: 8 - 16 mmol/L  6 (L)      BUN, Bld Latest Ref Range: 6 - 20 mg/dL   21 (H)      Creatinine Latest Ref Range: 0.5 - 1.4 mg/dL  1.1      eGFR if non African American Latest Ref Range: >60 mL/min/1.73 m^2  >60.0      eGFR if African American Latest Ref Range: >60 mL/min/1.73 m^2  >60.0      Glucose Latest Ref Range: 70 - 110 mg/dL  114 (H)      Calcium Latest Ref Range: 8.7 - 10.5 mg/dL  9.4      Alkaline Phosphatase Latest Ref Range: 55 - 135 U/L  84      PROTEIN TOTAL Latest Ref Range: 6.0 - 8.4 g/dL  7.0      Albumin Latest Ref Range: 3.5 - 5.2 g/dL  3.8      BILIRUBIN TOTAL Latest Ref Range: 0.1 - 1.0 mg/dL  0.6      AST Latest Ref Range: 10 - 40 U/L  23      ALT Latest Ref Range: 10 - 44 U/L  27      AFP Latest Ref Range: 0.0 - 8.4 ng/mL  4.5      Hep B S Ab Unknown     Negative   Antibody SARS CoV-2 Latest Ref Range: Negative  Positive (A)       RADIOLOGY REPORT Unknown    Attch    US ABDOMEN COMPLETE Unknown   Rpt     Hepatitis A Antibody IgG Unknown     Negative       Assessment:     1. Nodular thyroid disease  US Soft Tissue Head Neck Thyroid    T4, free    T3    Thyroglobulin    TSH   2. Thyroiditis     3. Obesity (BMI 30-39.9)     4. History of Graves' disease     5. Hyperlipidemia, unspecified hyperlipidemia type  Lipid Panel   6. Hypercholesterolemia  Lipid Panel   7. Hypovitaminosis D     8. Hashimoto's disease     9. Dysmetabolic syndrome  Hemoglobin A1C    Uric acid    Urinalysis    Microalbumin/creatinine urine ratio   10. NAFLD (nonalcoholic fatty liver disease)  Uric acid    vitamin E 200 UNIT capsule   11. Hyperglycemia  Hemoglobin A1C        Regarding Graves disease; appears to be clinical quiescent at the moment as far as thyroid activity. Will recheck full TFTs and thyroid antibody profile.  Regarding thyroid nodular disease; to obtain ffup thyroid USS ~ 05/2021.  Regarding possible opthalmopathy; to continue formal opthalmic referral to evaluate for this.  Depending on the current status as regards antibody titers and presence or absence of orbitopathy may  decide on permanent treatment for Graves.  His hx of NAFLD essentially exclude methimazole or PTU use as an option and his recent history of long term smoking makes radio iodine ablation a less than optimal option. Elective thyroidectomy would be the preferred management option for him and I have informed him as much but will await results of latest labs as detailed above.  Regarding NAFLD; ongoing ffup as per hepatology group and rec as regarding ETOH intake as per hepatology team. To start vit E supplements and will check current HBA1c to guide decision re possible addition of metformin.  Regarding hyperlipidemia; to continue antilipidemic therapy as before.       Plan:       FFup in ~ 4mths.

## 2020-08-18 LAB
CHOLEST SERPL-MCNC: 184 MG/DL (ref 120–199)
CHOLEST/HDLC SERPL: 3.8 {RATIO} (ref 2–5)
ESTIMATED AVG GLUCOSE: 80 MG/DL (ref 68–131)
HBA1C MFR BLD HPLC: 4.4 % (ref 4–5.6)
HDLC SERPL-MCNC: 48 MG/DL (ref 40–75)
HDLC SERPL: 26.1 % (ref 20–50)
LDLC SERPL CALC-MCNC: 100 MG/DL (ref 63–159)
NONHDLC SERPL-MCNC: 136 MG/DL
T3 SERPL-MCNC: 114 NG/DL (ref 60–180)
T4 FREE SERPL-MCNC: 0.99 NG/DL (ref 0.71–1.51)
TRIGL SERPL-MCNC: 180 MG/DL (ref 30–150)
TSH SERPL DL<=0.005 MIU/L-ACNC: 1.93 UIU/ML (ref 0.4–4)
URATE SERPL-MCNC: 5.8 MG/DL (ref 3.4–7)

## 2020-08-20 LAB
THRYOGLOBULIN INTERPRETATION: ABNORMAL
THYROGLOB AB SERPL-ACNC: 23 IU/ML
THYROGLOB SERPL-MCNC: 3.2 NG/ML

## 2020-09-29 ENCOUNTER — OFFICE VISIT (OUTPATIENT)
Dept: OPTOMETRY | Facility: CLINIC | Age: 55
End: 2020-09-29
Payer: COMMERCIAL

## 2020-09-29 DIAGNOSIS — H25.13 NUCLEAR SCLEROSIS, BILATERAL: ICD-10-CM

## 2020-09-29 DIAGNOSIS — H52.7 REFRACTIVE ERROR: ICD-10-CM

## 2020-09-29 DIAGNOSIS — H04.123 DRY EYE SYNDROME, BILATERAL: ICD-10-CM

## 2020-09-29 DIAGNOSIS — H40.039 ANATOMICAL NARROW ANGLE: Primary | ICD-10-CM

## 2020-09-29 PROCEDURE — 99999 PR PBB SHADOW E&M-EST. PATIENT-LVL III: CPT | Mod: PBBFAC,,, | Performed by: OPTOMETRIST

## 2020-09-29 PROCEDURE — 92014 PR EYE EXAM, EST PATIENT,COMPREHESV: ICD-10-PCS | Mod: S$GLB,,, | Performed by: OPTOMETRIST

## 2020-09-29 PROCEDURE — 92133 CPTRZD OPH DX IMG PST SGM ON: CPT | Mod: S$GLB,,, | Performed by: OPTOMETRIST

## 2020-09-29 PROCEDURE — 92014 COMPRE OPH EXAM EST PT 1/>: CPT | Mod: S$GLB,,, | Performed by: OPTOMETRIST

## 2020-09-29 PROCEDURE — 92133 POSTERIOR SEGMENT OCT OPTIC NERVE(OCULAR COHERENCE TOMOGRAPHY) - OU - BOTH EYES: ICD-10-PCS | Mod: S$GLB,,, | Performed by: OPTOMETRIST

## 2020-09-29 PROCEDURE — 99999 PR PBB SHADOW E&M-EST. PATIENT-LVL III: ICD-10-PCS | Mod: PBBFAC,,, | Performed by: OPTOMETRIST

## 2020-09-29 NOTE — PROGRESS NOTES
HPI     Here for f/u IOP, DFE today. Denies eye pain. Does not use any eye gtts   currently just water if need be.     Last edited by Burke Eller, OD on 9/29/2020  2:38 PM. (History)            Assessment /Plan     For exam results, see Encounter Report.    Anatomical narrow angle    Nuclear sclerosis, bilateral    Refractive error    Dry eye syndrome, bilateral      Anatomical narrow angle OU, 2-3+ on von aaron. Last gonio opened to TM. Denies halos around lights, pain, redness. Mild cataracts OU, not yet significant. Discussed risk and signs/symptoms of angle closure glc. Increased risk with increasing NS. OCT RNFL stable from previous. Previous [place] pt, being monitored every 6 months. Referred to Dr. Pires for f/u in 6 months.     Recent rx 02/2020, declines refraction today.    Advised against rinsing eye with tap water, recommend saline eye rinse instead. Recommend otc artificial tears 2-4 times daily. Return if worsening or no resolution.      RTC in 6 months for est care with Dr. Pires, or sooner prn.

## 2020-10-30 ENCOUNTER — TELEPHONE (OUTPATIENT)
Dept: HEPATOLOGY | Facility: CLINIC | Age: 55
End: 2020-10-30

## 2020-10-30 ENCOUNTER — PATIENT MESSAGE (OUTPATIENT)
Dept: HEPATOLOGY | Facility: CLINIC | Age: 55
End: 2020-10-30

## 2020-12-28 ENCOUNTER — TELEPHONE (OUTPATIENT)
Dept: HEPATOLOGY | Facility: CLINIC | Age: 55
End: 2020-12-28

## 2020-12-28 NOTE — TELEPHONE ENCOUNTER
I spoke to the pt and he wants to see Dr. Pickens again. I scheduled him for Jan 20th at 9:30am and mailed him the reminder with a note to schedule his US and labs as he's going to have his wife look at her callander before he schedules the testing.    ----- Message from Sydnee Tsai MA sent at 12/18/2020  3:06 PM CST -----  Regarding: FW: Appointment  Contact: [phone]    ----- Message -----  From: Daphne Munoz  Sent: 12/18/2020   1:01 PM CST  To: [facility] Staff  Subject: Appointment                                      Pt called in to schedule appointment. Pt wants to see Dr. Pickens and not a NP. Pt stated it is for a follow up visit. Pt can be reached at [phone]

## 2020-12-29 DIAGNOSIS — K74.00 LIVER FIBROSIS: Primary | ICD-10-CM

## 2021-01-06 ENCOUNTER — OFFICE VISIT (OUTPATIENT)
Dept: PODIATRY | Facility: CLINIC | Age: 56
End: 2021-01-06
Payer: COMMERCIAL

## 2021-01-06 VITALS — HEIGHT: 70 IN | BODY MASS INDEX: 31.68 KG/M2 | WEIGHT: 221.31 LBS

## 2021-01-06 DIAGNOSIS — M54.31 BILATERAL SCIATICA: Primary | ICD-10-CM

## 2021-01-06 DIAGNOSIS — M54.32 BILATERAL SCIATICA: Primary | ICD-10-CM

## 2021-01-06 DIAGNOSIS — G60.9 IDIOPATHIC PERIPHERAL NEUROPATHY: ICD-10-CM

## 2021-01-06 PROCEDURE — 3008F BODY MASS INDEX DOCD: CPT | Mod: CPTII,S$GLB,, | Performed by: PODIATRIST

## 2021-01-06 PROCEDURE — 1125F PR PAIN SEVERITY QUANTIFIED, PAIN PRESENT: ICD-10-PCS | Mod: S$GLB,,, | Performed by: PODIATRIST

## 2021-01-06 PROCEDURE — 99202 OFFICE O/P NEW SF 15 MIN: CPT | Mod: S$GLB,,, | Performed by: PODIATRIST

## 2021-01-06 PROCEDURE — 3008F PR BODY MASS INDEX (BMI) DOCUMENTED: ICD-10-PCS | Mod: CPTII,S$GLB,, | Performed by: PODIATRIST

## 2021-01-06 PROCEDURE — 99999 PR PBB SHADOW E&M-EST. PATIENT-LVL III: ICD-10-PCS | Mod: PBBFAC,,, | Performed by: PODIATRIST

## 2021-01-06 PROCEDURE — 99202 PR OFFICE/OUTPT VISIT, NEW, LEVL II, 15-29 MIN: ICD-10-PCS | Mod: S$GLB,,, | Performed by: PODIATRIST

## 2021-01-06 PROCEDURE — 99999 PR PBB SHADOW E&M-EST. PATIENT-LVL III: CPT | Mod: PBBFAC,,, | Performed by: PODIATRIST

## 2021-01-06 PROCEDURE — 1125F AMNT PAIN NOTED PAIN PRSNT: CPT | Mod: S$GLB,,, | Performed by: PODIATRIST

## 2021-01-13 ENCOUNTER — HOSPITAL ENCOUNTER (OUTPATIENT)
Dept: RADIOLOGY | Facility: HOSPITAL | Age: 56
Discharge: HOME | End: 2021-01-13
Attending: INTERNAL MEDICINE
Payer: COMMERCIAL

## 2021-01-13 DIAGNOSIS — K74.00 LIVER FIBROSIS: ICD-10-CM

## 2021-01-13 PROCEDURE — 76700 US ABDOMEN COMPLETE: ICD-10-PCS | Mod: 26,,, | Performed by: RADIOLOGY

## 2021-01-13 PROCEDURE — 76700 US EXAM ABDOM COMPLETE: CPT | Mod: TC

## 2021-01-13 PROCEDURE — 76700 US EXAM ABDOM COMPLETE: CPT | Mod: 26,,, | Performed by: RADIOLOGY

## 2021-01-19 ENCOUNTER — OFFICE VISIT (OUTPATIENT)
Dept: ENDOCRINOLOGY | Facility: CLINIC | Age: 56
End: 2021-01-19
Payer: COMMERCIAL

## 2021-01-19 VITALS
WEIGHT: 213.5 LBS | DIASTOLIC BLOOD PRESSURE: 80 MMHG | HEART RATE: 76 BPM | BODY MASS INDEX: 30.56 KG/M2 | HEIGHT: 70 IN | TEMPERATURE: 98 F | OXYGEN SATURATION: 98 % | SYSTOLIC BLOOD PRESSURE: 120 MMHG

## 2021-01-19 DIAGNOSIS — K76.0 NAFLD (NONALCOHOLIC FATTY LIVER DISEASE): ICD-10-CM

## 2021-01-19 DIAGNOSIS — E04.9 GOITER: ICD-10-CM

## 2021-01-19 DIAGNOSIS — Z86.39 HISTORY OF THYROTOXICOSIS: ICD-10-CM

## 2021-01-19 DIAGNOSIS — E55.9 HYPOVITAMINOSIS D: ICD-10-CM

## 2021-01-19 DIAGNOSIS — Z86.39 HISTORY OF GRAVES' DISEASE: ICD-10-CM

## 2021-01-19 DIAGNOSIS — E78.5 HYPERLIPIDEMIA, UNSPECIFIED HYPERLIPIDEMIA TYPE: ICD-10-CM

## 2021-01-19 DIAGNOSIS — E88.810 DYSMETABOLIC SYNDROME: ICD-10-CM

## 2021-01-19 DIAGNOSIS — E78.00 HYPERCHOLESTEROLEMIA: ICD-10-CM

## 2021-01-19 DIAGNOSIS — R79.89 ABNORMAL THYROID BLOOD TEST: ICD-10-CM

## 2021-01-19 DIAGNOSIS — E04.1 NODULAR THYROID DISEASE: Primary | ICD-10-CM

## 2021-01-19 DIAGNOSIS — N40.0 BENIGN PROSTATIC HYPERPLASIA, UNSPECIFIED WHETHER LOWER URINARY TRACT SYMPTOMS PRESENT: ICD-10-CM

## 2021-01-19 DIAGNOSIS — E06.3 HASHIMOTO'S DISEASE: ICD-10-CM

## 2021-01-19 DIAGNOSIS — E05.90 HYPERTHYROIDISM: ICD-10-CM

## 2021-01-19 PROCEDURE — 99214 OFFICE O/P EST MOD 30 MIN: CPT | Mod: S$GLB,,, | Performed by: INTERNAL MEDICINE

## 2021-01-19 PROCEDURE — 1125F PR PAIN SEVERITY QUANTIFIED, PAIN PRESENT: ICD-10-PCS | Mod: S$GLB,,, | Performed by: INTERNAL MEDICINE

## 2021-01-19 PROCEDURE — 1125F AMNT PAIN NOTED PAIN PRSNT: CPT | Mod: S$GLB,,, | Performed by: INTERNAL MEDICINE

## 2021-01-19 PROCEDURE — 3008F BODY MASS INDEX DOCD: CPT | Mod: CPTII,S$GLB,, | Performed by: INTERNAL MEDICINE

## 2021-01-19 PROCEDURE — 3008F PR BODY MASS INDEX (BMI) DOCUMENTED: ICD-10-PCS | Mod: CPTII,S$GLB,, | Performed by: INTERNAL MEDICINE

## 2021-01-19 PROCEDURE — 99999 PR PBB SHADOW E&M-EST. PATIENT-LVL IV: CPT | Mod: PBBFAC,,, | Performed by: INTERNAL MEDICINE

## 2021-01-19 PROCEDURE — 99999 PR PBB SHADOW E&M-EST. PATIENT-LVL IV: ICD-10-PCS | Mod: PBBFAC,,, | Performed by: INTERNAL MEDICINE

## 2021-01-19 PROCEDURE — 99214 PR OFFICE/OUTPT VISIT, EST, LEVL IV, 30-39 MIN: ICD-10-PCS | Mod: S$GLB,,, | Performed by: INTERNAL MEDICINE

## 2021-01-20 ENCOUNTER — LAB VISIT (OUTPATIENT)
Dept: LAB | Facility: HOSPITAL | Age: 56
End: 2021-01-20
Attending: INTERNAL MEDICINE
Payer: COMMERCIAL

## 2021-01-20 ENCOUNTER — OFFICE VISIT (OUTPATIENT)
Dept: HEPATOLOGY | Facility: CLINIC | Age: 56
End: 2021-01-20
Payer: COMMERCIAL

## 2021-01-20 ENCOUNTER — PROCEDURE VISIT (OUTPATIENT)
Dept: HEPATOLOGY | Facility: CLINIC | Age: 56
End: 2021-01-20
Payer: COMMERCIAL

## 2021-01-20 VITALS
HEART RATE: 74 BPM | WEIGHT: 212.5 LBS | BODY MASS INDEX: 30.42 KG/M2 | DIASTOLIC BLOOD PRESSURE: 89 MMHG | SYSTOLIC BLOOD PRESSURE: 161 MMHG | RESPIRATION RATE: 18 BRPM | OXYGEN SATURATION: 99 % | HEIGHT: 70 IN

## 2021-01-20 DIAGNOSIS — E06.3 HASHIMOTO'S DISEASE: ICD-10-CM

## 2021-01-20 DIAGNOSIS — E88.810 DYSMETABOLIC SYNDROME: ICD-10-CM

## 2021-01-20 DIAGNOSIS — K76.0 NAFLD (NONALCOHOLIC FATTY LIVER DISEASE): ICD-10-CM

## 2021-01-20 DIAGNOSIS — E55.9 HYPOVITAMINOSIS D: ICD-10-CM

## 2021-01-20 DIAGNOSIS — E78.00 HYPERCHOLESTEROLEMIA: ICD-10-CM

## 2021-01-20 DIAGNOSIS — N40.0 BENIGN PROSTATIC HYPERPLASIA, UNSPECIFIED WHETHER LOWER URINARY TRACT SYMPTOMS PRESENT: ICD-10-CM

## 2021-01-20 DIAGNOSIS — Z86.39 HISTORY OF GRAVES' DISEASE: ICD-10-CM

## 2021-01-20 DIAGNOSIS — E78.5 HYPERLIPIDEMIA, UNSPECIFIED HYPERLIPIDEMIA TYPE: ICD-10-CM

## 2021-01-20 DIAGNOSIS — K76.0 NAFLD (NONALCOHOLIC FATTY LIVER DISEASE): Primary | ICD-10-CM

## 2021-01-20 DIAGNOSIS — E04.1 NODULAR THYROID DISEASE: ICD-10-CM

## 2021-01-20 PROBLEM — F10.10 EXCESSIVE DRINKING ALCOHOL: Status: RESOLVED | Noted: 2018-06-01 | Resolved: 2021-01-20

## 2021-01-20 LAB
CA-I BLDV-SCNC: 1.2 MMOL/L (ref 1.06–1.42)
CHOLEST SERPL-MCNC: 165 MG/DL (ref 120–199)
CHOLEST/HDLC SERPL: 3.2 {RATIO} (ref 2–5)
GGT SERPL-CCNC: 210 U/L (ref 8–55)
HDLC SERPL-MCNC: 52 MG/DL (ref 40–75)
HDLC SERPL: 31.5 % (ref 20–50)
LDLC SERPL CALC-MCNC: 98.2 MG/DL (ref 63–159)
NONHDLC SERPL-MCNC: 113 MG/DL
PTH-INTACT SERPL-MCNC: 21.1 PG/ML (ref 9–77)
T4 FREE SERPL-MCNC: 0.91 NG/DL (ref 0.71–1.51)
TRIGL SERPL-MCNC: 74 MG/DL (ref 30–150)
TSH SERPL DL<=0.005 MIU/L-ACNC: 0.98 UIU/ML (ref 0.4–4)
URATE SERPL-MCNC: 6.6 MG/DL (ref 3.4–7)

## 2021-01-20 PROCEDURE — 84550 ASSAY OF BLOOD/URIC ACID: CPT

## 2021-01-20 PROCEDURE — 99999 PR PBB SHADOW E&M-EST. PATIENT-LVL IV: ICD-10-PCS | Mod: PBBFAC,,, | Performed by: INTERNAL MEDICINE

## 2021-01-20 PROCEDURE — 84443 ASSAY THYROID STIM HORMONE: CPT

## 2021-01-20 PROCEDURE — 82306 VITAMIN D 25 HYDROXY: CPT

## 2021-01-20 PROCEDURE — 3008F PR BODY MASS INDEX (BMI) DOCUMENTED: ICD-10-PCS | Mod: CPTII,S$GLB,, | Performed by: INTERNAL MEDICINE

## 2021-01-20 PROCEDURE — 82308 ASSAY OF CALCITONIN: CPT

## 2021-01-20 PROCEDURE — 3008F BODY MASS INDEX DOCD: CPT | Mod: CPTII,S$GLB,, | Performed by: INTERNAL MEDICINE

## 2021-01-20 PROCEDURE — 99214 PR OFFICE/OUTPT VISIT, EST, LEVL IV, 30-39 MIN: ICD-10-PCS | Mod: S$GLB,,, | Performed by: INTERNAL MEDICINE

## 2021-01-20 PROCEDURE — 99999 PR PBB SHADOW E&M-EST. PATIENT-LVL IV: CPT | Mod: PBBFAC,,, | Performed by: INTERNAL MEDICINE

## 2021-01-20 PROCEDURE — 83970 ASSAY OF PARATHORMONE: CPT

## 2021-01-20 PROCEDURE — 84154 ASSAY OF PSA FREE: CPT

## 2021-01-20 PROCEDURE — 84480 ASSAY TRIIODOTHYRONINE (T3): CPT

## 2021-01-20 PROCEDURE — 91200 LIVER ELASTOGRAPHY: CPT | Mod: S$GLB,,, | Performed by: INTERNAL MEDICINE

## 2021-01-20 PROCEDURE — 83018 HEAVY METAL QUAN EACH NES: CPT

## 2021-01-20 PROCEDURE — 84439 ASSAY OF FREE THYROXINE: CPT

## 2021-01-20 PROCEDURE — 1125F AMNT PAIN NOTED PAIN PRSNT: CPT | Mod: S$GLB,,, | Performed by: INTERNAL MEDICINE

## 2021-01-20 PROCEDURE — 1125F PR PAIN SEVERITY QUANTIFIED, PAIN PRESENT: ICD-10-PCS | Mod: S$GLB,,, | Performed by: INTERNAL MEDICINE

## 2021-01-20 PROCEDURE — 82330 ASSAY OF CALCIUM: CPT

## 2021-01-20 PROCEDURE — 86316 IMMUNOASSAY TUMOR OTHER: CPT

## 2021-01-20 PROCEDURE — 36415 COLL VENOUS BLD VENIPUNCTURE: CPT

## 2021-01-20 PROCEDURE — 80061 LIPID PANEL: CPT

## 2021-01-20 PROCEDURE — 83036 HEMOGLOBIN GLYCOSYLATED A1C: CPT

## 2021-01-20 PROCEDURE — 82977 ASSAY OF GGT: CPT

## 2021-01-20 PROCEDURE — 91200 FIBROSCAN (VIBRATION CONTROLLED TRANSIENT ELASTOGRAPHY): ICD-10-PCS | Mod: S$GLB,,, | Performed by: INTERNAL MEDICINE

## 2021-01-20 PROCEDURE — 86800 THYROGLOBULIN ANTIBODY: CPT

## 2021-01-20 PROCEDURE — 84153 ASSAY OF PSA TOTAL: CPT

## 2021-01-20 PROCEDURE — 99214 OFFICE O/P EST MOD 30 MIN: CPT | Mod: S$GLB,,, | Performed by: INTERNAL MEDICINE

## 2021-01-21 LAB
25(OH)D3+25(OH)D2 SERPL-MCNC: 54 NG/ML (ref 30–96)
CALCIT SERPL-MCNC: <5 PG/ML
ESTIMATED AVG GLUCOSE: 88 MG/DL (ref 68–131)
HBA1C MFR BLD HPLC: 4.7 % (ref 4–5.6)
PROSTATE SPECIFIC ANTIGEN, TOTAL: 1.6 NG/ML (ref 0–4)
PSA FREE MFR SERPL: 20 %
PSA FREE SERPL-MCNC: 0.32 NG/ML (ref 0.01–1.5)
T3 SERPL-MCNC: 99 NG/DL (ref 60–180)
THRYOGLOBULIN INTERPRETATION: ABNORMAL
THYROGLOB AB SERPL-ACNC: 21 IU/ML
THYROGLOB SERPL-MCNC: 0.6 NG/ML

## 2021-01-22 LAB — IODINE SERPL-MCNC: 65 NG/ML (ref 40–92)

## 2021-01-24 LAB — CGA SERPL-MCNC: 65 NG/ML (ref 0–103)

## 2021-02-03 ENCOUNTER — PATIENT MESSAGE (OUTPATIENT)
Dept: INTERNAL MEDICINE | Facility: CLINIC | Age: 56
End: 2021-02-03

## 2021-02-17 ENCOUNTER — OFFICE VISIT (OUTPATIENT)
Dept: INTERNAL MEDICINE | Facility: CLINIC | Age: 56
End: 2021-02-17
Payer: COMMERCIAL

## 2021-02-17 VITALS
OXYGEN SATURATION: 98 % | BODY MASS INDEX: 31.88 KG/M2 | HEART RATE: 70 BPM | HEIGHT: 70 IN | DIASTOLIC BLOOD PRESSURE: 78 MMHG | SYSTOLIC BLOOD PRESSURE: 128 MMHG | WEIGHT: 222.69 LBS

## 2021-02-17 DIAGNOSIS — E78.5 HYPERLIPIDEMIA, UNSPECIFIED HYPERLIPIDEMIA TYPE: ICD-10-CM

## 2021-02-17 DIAGNOSIS — M17.0 PRIMARY OSTEOARTHRITIS OF BOTH KNEES: ICD-10-CM

## 2021-02-17 DIAGNOSIS — E05.00 GRAVES DISEASE: ICD-10-CM

## 2021-02-17 DIAGNOSIS — R19.00 ABDOMINAL MASS, UNSPECIFIED ABDOMINAL LOCATION: ICD-10-CM

## 2021-02-17 DIAGNOSIS — Z00.00 ANNUAL PHYSICAL EXAM: Primary | ICD-10-CM

## 2021-02-17 DIAGNOSIS — K76.0 FATTY LIVER: ICD-10-CM

## 2021-02-17 DIAGNOSIS — Z86.010 HX OF COLONIC POLYPS: ICD-10-CM

## 2021-02-17 DIAGNOSIS — R07.89 ATYPICAL CHEST PAIN: ICD-10-CM

## 2021-02-17 PROCEDURE — 3008F PR BODY MASS INDEX (BMI) DOCUMENTED: ICD-10-PCS | Mod: CPTII,S$GLB,, | Performed by: INTERNAL MEDICINE

## 2021-02-17 PROCEDURE — 99396 PR PREVENTIVE VISIT,EST,40-64: ICD-10-PCS | Mod: S$GLB,,, | Performed by: INTERNAL MEDICINE

## 2021-02-17 PROCEDURE — 99396 PREV VISIT EST AGE 40-64: CPT | Mod: S$GLB,,, | Performed by: INTERNAL MEDICINE

## 2021-02-17 PROCEDURE — 99999 PR PBB SHADOW E&M-EST. PATIENT-LVL IV: CPT | Mod: PBBFAC,,, | Performed by: INTERNAL MEDICINE

## 2021-02-17 PROCEDURE — 1125F AMNT PAIN NOTED PAIN PRSNT: CPT | Mod: S$GLB,,, | Performed by: INTERNAL MEDICINE

## 2021-02-17 PROCEDURE — 99999 PR PBB SHADOW E&M-EST. PATIENT-LVL IV: ICD-10-PCS | Mod: PBBFAC,,, | Performed by: INTERNAL MEDICINE

## 2021-02-17 PROCEDURE — 3008F BODY MASS INDEX DOCD: CPT | Mod: CPTII,S$GLB,, | Performed by: INTERNAL MEDICINE

## 2021-02-17 PROCEDURE — 1125F PR PAIN SEVERITY QUANTIFIED, PAIN PRESENT: ICD-10-PCS | Mod: S$GLB,,, | Performed by: INTERNAL MEDICINE

## 2021-02-20 ENCOUNTER — HOSPITAL ENCOUNTER (OUTPATIENT)
Dept: RADIOLOGY | Facility: HOSPITAL | Age: 56
Discharge: HOME | End: 2021-02-20
Attending: INTERNAL MEDICINE
Payer: COMMERCIAL

## 2021-02-20 DIAGNOSIS — R19.00 ABDOMINAL MASS, UNSPECIFIED ABDOMINAL LOCATION: ICD-10-CM

## 2021-02-20 PROCEDURE — 25500020 PHARM REV CODE 255

## 2021-02-20 PROCEDURE — 74177 CT ABDOMEN PELVIS WITH CONTRAST: ICD-10-PCS | Mod: 26,,, | Performed by: RADIOLOGY

## 2021-02-20 PROCEDURE — 74177 CT ABD & PELVIS W/CONTRAST: CPT | Mod: 26,,, | Performed by: RADIOLOGY

## 2021-02-20 PROCEDURE — A9698 NON-RAD CONTRAST MATERIALNOC: HCPCS

## 2021-02-20 PROCEDURE — 74177 CT ABD & PELVIS W/CONTRAST: CPT | Mod: TC

## 2021-02-20 RX ADMIN — IOHEXOL 100 ML: 350 INJECTION, SOLUTION INTRAVENOUS at 09:02

## 2021-02-20 RX ADMIN — IOHEXOL 1000 ML: 12 SOLUTION ORAL at 09:02

## 2021-02-22 ENCOUNTER — TELEPHONE (OUTPATIENT)
Dept: INTERNAL MEDICINE | Facility: CLINIC | Age: 56
End: 2021-02-22

## 2021-02-22 DIAGNOSIS — I70.0 AORTIC ATHEROSCLEROSIS: Primary | ICD-10-CM

## 2021-02-22 RX ORDER — ROSUVASTATIN CALCIUM 20 MG/1
20 TABLET, COATED ORAL NIGHTLY
Qty: 90 TABLET | Refills: 3 | Status: SHIPPED | OUTPATIENT
Start: 2021-02-22 | End: 2022-01-13

## 2021-03-29 ENCOUNTER — OFFICE VISIT (OUTPATIENT)
Dept: OPHTHALMOLOGY | Facility: CLINIC | Age: 56
End: 2021-03-29
Payer: COMMERCIAL

## 2021-03-29 DIAGNOSIS — H04.123 DRY EYE SYNDROME, BILATERAL: ICD-10-CM

## 2021-03-29 DIAGNOSIS — H02.88A MEIBOMIAN GLAND DYSFUNCTION (MGD), BILATERAL, BOTH UPPER AND LOWER LIDS: ICD-10-CM

## 2021-03-29 DIAGNOSIS — H02.88B MEIBOMIAN GLAND DYSFUNCTION (MGD), BILATERAL, BOTH UPPER AND LOWER LIDS: ICD-10-CM

## 2021-03-29 DIAGNOSIS — H40.033 ANATOMICAL NARROW ANGLE, BILATERAL: Primary | ICD-10-CM

## 2021-03-29 PROCEDURE — 1126F AMNT PAIN NOTED NONE PRSNT: CPT | Mod: S$GLB,,, | Performed by: OPHTHALMOLOGY

## 2021-03-29 PROCEDURE — 1126F PR PAIN SEVERITY QUANTIFIED, NO PAIN PRESENT: ICD-10-PCS | Mod: S$GLB,,, | Performed by: OPHTHALMOLOGY

## 2021-03-29 PROCEDURE — 99999 PR PBB SHADOW E&M-EST. PATIENT-LVL III: CPT | Mod: PBBFAC,,, | Performed by: OPHTHALMOLOGY

## 2021-03-29 PROCEDURE — 99213 PR OFFICE/OUTPT VISIT, EST, LEVL III, 20-29 MIN: ICD-10-PCS | Mod: S$GLB,,, | Performed by: OPHTHALMOLOGY

## 2021-03-29 PROCEDURE — 99213 OFFICE O/P EST LOW 20 MIN: CPT | Mod: S$GLB,,, | Performed by: OPHTHALMOLOGY

## 2021-03-29 PROCEDURE — 92020 GONIOSCOPY: CPT | Mod: S$GLB,,, | Performed by: OPHTHALMOLOGY

## 2021-03-29 PROCEDURE — 99999 PR PBB SHADOW E&M-EST. PATIENT-LVL III: ICD-10-PCS | Mod: PBBFAC,,, | Performed by: OPHTHALMOLOGY

## 2021-03-29 PROCEDURE — 92020 PR SPECIAL EYE EVAL,GONIOSCOPY: ICD-10-PCS | Mod: S$GLB,,, | Performed by: OPHTHALMOLOGY

## 2021-05-19 ENCOUNTER — HOSPITAL ENCOUNTER (OUTPATIENT)
Dept: RADIOLOGY | Facility: HOSPITAL | Age: 56
Discharge: HOME | End: 2021-05-19
Attending: INTERNAL MEDICINE
Payer: COMMERCIAL

## 2021-05-19 DIAGNOSIS — E04.1 NODULAR THYROID DISEASE: ICD-10-CM

## 2021-05-19 PROCEDURE — 76536 US EXAM OF HEAD AND NECK: CPT | Mod: 26,,, | Performed by: RADIOLOGY

## 2021-05-19 PROCEDURE — 76536 US SOFT TISSUE HEAD NECK THYROID: ICD-10-PCS | Mod: 26,,, | Performed by: RADIOLOGY

## 2021-05-19 PROCEDURE — 76536 US EXAM OF HEAD AND NECK: CPT | Mod: TC

## 2021-06-25 NOTE — PROGRESS NOTES
HPI     Presenting Complaint: Pt here to[place] for yearly ocular health check and   reffered from thyroid doctor because of graves disease and hashimoto. DLE:   1 year     (+) Pt stats distance vision has been stable with current glasses. Pt   states readings small / fine print has gotten difcult.    Ophthalmic medication / drops: Art tears as needed for dryness  (+) Pt states eyes feel dry  (-) headaches  (-) diplopia   (-) flashes / (+) hx of floaters    Agree with above. Denies eye pain. Mother received injections for macular   degeneration.    Last edited by Giuliana Story MD on 8/9/2018 10:06 AM.   (History)        ROS     Positive for: Musculoskeletal (gabapentin et al for back pain), Endocrine   (Graves/Hashimoto's)    Negative for: Cardiovascular (denies HTN), Eyes (denies surgery/trauma;   except for burns from welding), Respiratory (denies asthma)    Last edited by Giuliana Story MD on 8/9/2018 10:58 AM.   (History)        Assessment /Plan     For exam results, see Encounter Report.    Anatomical narrow angle    Family history of macular degeneration    Graves disease    Myelinated optic nerve fiber layer, right  -     Color Fundus Photography - OU - Both Eyes; Future          Open to thin TM. Repeat gonioscopy annually. IOP and C:D WNL. Reviewed ACG signs and symptoms.     Mother received injections. Macula currently looks stable    No lagophthalmos. Continue lubrication with artificial tears prn. Denies diplopia, EOM's appear normal. Mitch WNL.     Appears to have myelinated nerve fibers inferior disc OD, but not previously documented. Due to Graves, baseline photos taken today 8/9/18. Pt then recalled he had photos on his phone by his friend, Dr. Adkins - today's photos consistent with those. RTC 6 months for OCT nerve. If stable, monitor yearly.     Declines new MRx    Addendum - Rx requested 8/27/18.                Pt reports continued anxiety, depressive symtoms. She tolerated ECT well, no adverse events. Denies any SI/HI.

## 2021-07-18 ENCOUNTER — PATIENT OUTREACH (OUTPATIENT)
Dept: ADMINISTRATIVE | Facility: OTHER | Age: 56
End: 2021-07-18

## 2021-07-19 ENCOUNTER — LAB VISIT (OUTPATIENT)
Dept: LAB | Facility: HOSPITAL | Age: 56
End: 2021-07-19
Attending: INTERNAL MEDICINE
Payer: COMMERCIAL

## 2021-07-19 ENCOUNTER — OFFICE VISIT (OUTPATIENT)
Dept: ENDOCRINOLOGY | Facility: CLINIC | Age: 56
End: 2021-07-19
Payer: COMMERCIAL

## 2021-07-19 VITALS
WEIGHT: 231.06 LBS | HEART RATE: 83 BPM | HEIGHT: 70 IN | BODY MASS INDEX: 33.08 KG/M2 | TEMPERATURE: 98 F | SYSTOLIC BLOOD PRESSURE: 126 MMHG | DIASTOLIC BLOOD PRESSURE: 82 MMHG | OXYGEN SATURATION: 96 %

## 2021-07-19 DIAGNOSIS — E78.5 HYPERLIPIDEMIA, UNSPECIFIED HYPERLIPIDEMIA TYPE: ICD-10-CM

## 2021-07-19 DIAGNOSIS — E88.810 DYSMETABOLIC SYNDROME: ICD-10-CM

## 2021-07-19 DIAGNOSIS — E04.1 NODULAR THYROID DISEASE: ICD-10-CM

## 2021-07-19 DIAGNOSIS — E06.9 THYROIDITIS: ICD-10-CM

## 2021-07-19 DIAGNOSIS — E78.00 HYPERCHOLESTEROLEMIA: ICD-10-CM

## 2021-07-19 DIAGNOSIS — E66.9 OBESITY (BMI 30-39.9): ICD-10-CM

## 2021-07-19 DIAGNOSIS — E55.9 HYPOVITAMINOSIS D: ICD-10-CM

## 2021-07-19 DIAGNOSIS — K76.0 NAFLD (NONALCOHOLIC FATTY LIVER DISEASE): ICD-10-CM

## 2021-07-19 DIAGNOSIS — E06.3 HASHIMOTO'S DISEASE: ICD-10-CM

## 2021-07-19 DIAGNOSIS — E04.1 NODULAR THYROID DISEASE: Primary | ICD-10-CM

## 2021-07-19 DIAGNOSIS — Z86.39 HISTORY OF GRAVES' DISEASE: ICD-10-CM

## 2021-07-19 DIAGNOSIS — R79.89 ABNORMAL THYROID BLOOD TEST: ICD-10-CM

## 2021-07-19 LAB
ALBUMIN SERPL BCP-MCNC: 4.6 G/DL (ref 3.5–5.2)
ALP SERPL-CCNC: 86 U/L (ref 55–135)
ALT SERPL W/O P-5'-P-CCNC: 33 U/L (ref 10–44)
AMMONIA PLAS-SCNC: 40 UMOL/L (ref 10–50)
ANION GAP SERPL CALC-SCNC: 8 MMOL/L (ref 8–16)
AST SERPL-CCNC: 21 U/L (ref 10–40)
BASOPHILS # BLD AUTO: 0.03 K/UL (ref 0–0.2)
BASOPHILS NFR BLD: 0.5 % (ref 0–1.9)
BILIRUB SERPL-MCNC: 1.1 MG/DL (ref 0.1–1)
BUN SERPL-MCNC: 20 MG/DL (ref 6–20)
CALCIUM SERPL-MCNC: 9.9 MG/DL (ref 8.7–10.5)
CHLORIDE SERPL-SCNC: 108 MMOL/L (ref 95–110)
CO2 SERPL-SCNC: 28 MMOL/L (ref 23–29)
CREAT SERPL-MCNC: 1.1 MG/DL (ref 0.5–1.4)
DIFFERENTIAL METHOD: ABNORMAL
EOSINOPHIL # BLD AUTO: 0.1 K/UL (ref 0–0.5)
EOSINOPHIL NFR BLD: 1.5 % (ref 0–8)
ERYTHROCYTE [DISTWIDTH] IN BLOOD BY AUTOMATED COUNT: 13 % (ref 11.5–14.5)
EST. GFR  (AFRICAN AMERICAN): >60 ML/MIN/1.73 M^2
EST. GFR  (NON AFRICAN AMERICAN): >60 ML/MIN/1.73 M^2
GLUCOSE SERPL-MCNC: 95 MG/DL (ref 70–110)
HCT VFR BLD AUTO: 40.7 % (ref 40–54)
HGB BLD-MCNC: 14.4 G/DL (ref 14–18)
IMM GRANULOCYTES # BLD AUTO: 0.02 K/UL (ref 0–0.04)
IMM GRANULOCYTES NFR BLD AUTO: 0.3 % (ref 0–0.5)
LACTATE SERPL-SCNC: 1.1 MMOL/L (ref 0.5–2.2)
LYMPHOCYTES # BLD AUTO: 1.8 K/UL (ref 1–4.8)
LYMPHOCYTES NFR BLD: 29.5 % (ref 18–48)
MCH RBC QN AUTO: 35.6 PG (ref 27–31)
MCHC RBC AUTO-ENTMCNC: 35.4 G/DL (ref 32–36)
MCV RBC AUTO: 101 FL (ref 82–98)
MONOCYTES # BLD AUTO: 0.6 K/UL (ref 0.3–1)
MONOCYTES NFR BLD: 9.9 % (ref 4–15)
NEUTROPHILS # BLD AUTO: 3.5 K/UL (ref 1.8–7.7)
NEUTROPHILS NFR BLD: 58.3 % (ref 38–73)
NRBC BLD-RTO: 0 /100 WBC
PLATELET # BLD AUTO: 167 K/UL (ref 150–450)
PMV BLD AUTO: 9.4 FL (ref 9.2–12.9)
POTASSIUM SERPL-SCNC: 4.5 MMOL/L (ref 3.5–5.1)
PROT SERPL-MCNC: 7.7 G/DL (ref 6–8.4)
RBC # BLD AUTO: 4.05 M/UL (ref 4.6–6.2)
SODIUM SERPL-SCNC: 144 MMOL/L (ref 136–145)
T4 FREE SERPL-MCNC: 0.92 NG/DL (ref 0.71–1.51)
TSH SERPL DL<=0.005 MIU/L-ACNC: 1.85 UIU/ML (ref 0.4–4)
WBC # BLD AUTO: 5.93 K/UL (ref 3.9–12.7)

## 2021-07-19 PROCEDURE — 36415 COLL VENOUS BLD VENIPUNCTURE: CPT | Performed by: INTERNAL MEDICINE

## 2021-07-19 PROCEDURE — 3008F PR BODY MASS INDEX (BMI) DOCUMENTED: ICD-10-PCS | Mod: CPTII,S$GLB,, | Performed by: INTERNAL MEDICINE

## 2021-07-19 PROCEDURE — 84480 ASSAY TRIIODOTHYRONINE (T3): CPT | Performed by: INTERNAL MEDICINE

## 2021-07-19 PROCEDURE — 83605 ASSAY OF LACTIC ACID: CPT | Performed by: INTERNAL MEDICINE

## 2021-07-19 PROCEDURE — 84439 ASSAY OF FREE THYROXINE: CPT | Performed by: INTERNAL MEDICINE

## 2021-07-19 PROCEDURE — 99999 PR PBB SHADOW E&M-EST. PATIENT-LVL IV: ICD-10-PCS | Mod: PBBFAC,,, | Performed by: INTERNAL MEDICINE

## 2021-07-19 PROCEDURE — 1125F PR PAIN SEVERITY QUANTIFIED, PAIN PRESENT: ICD-10-PCS | Mod: CPTII,S$GLB,, | Performed by: INTERNAL MEDICINE

## 2021-07-19 PROCEDURE — 99214 OFFICE O/P EST MOD 30 MIN: CPT | Mod: S$GLB,,, | Performed by: INTERNAL MEDICINE

## 2021-07-19 PROCEDURE — 86800 THYROGLOBULIN ANTIBODY: CPT | Performed by: INTERNAL MEDICINE

## 2021-07-19 PROCEDURE — 80053 COMPREHEN METABOLIC PANEL: CPT | Performed by: INTERNAL MEDICINE

## 2021-07-19 PROCEDURE — 1125F AMNT PAIN NOTED PAIN PRSNT: CPT | Mod: CPTII,S$GLB,, | Performed by: INTERNAL MEDICINE

## 2021-07-19 PROCEDURE — 99999 PR PBB SHADOW E&M-EST. PATIENT-LVL IV: CPT | Mod: PBBFAC,,, | Performed by: INTERNAL MEDICINE

## 2021-07-19 PROCEDURE — 85025 COMPLETE CBC W/AUTO DIFF WBC: CPT | Performed by: INTERNAL MEDICINE

## 2021-07-19 PROCEDURE — 82140 ASSAY OF AMMONIA: CPT | Performed by: INTERNAL MEDICINE

## 2021-07-19 PROCEDURE — 84443 ASSAY THYROID STIM HORMONE: CPT | Performed by: INTERNAL MEDICINE

## 2021-07-19 PROCEDURE — 99214 PR OFFICE/OUTPT VISIT, EST, LEVL IV, 30-39 MIN: ICD-10-PCS | Mod: S$GLB,,, | Performed by: INTERNAL MEDICINE

## 2021-07-19 PROCEDURE — 3008F BODY MASS INDEX DOCD: CPT | Mod: CPTII,S$GLB,, | Performed by: INTERNAL MEDICINE

## 2021-07-20 LAB — T3 SERPL-MCNC: 108 NG/DL (ref 60–180)

## 2021-07-21 LAB
THRYOGLOBULIN INTERPRETATION: ABNORMAL
THYROGLOB AB SERPL-ACNC: 40 IU/ML
THYROGLOB SERPL-MCNC: 0.5 NG/ML

## 2021-08-11 ENCOUNTER — IMMUNIZATION (OUTPATIENT)
Dept: PRIMARY CARE CLINIC | Facility: CLINIC | Age: 56
End: 2021-08-11
Payer: COMMERCIAL

## 2021-08-11 DIAGNOSIS — Z23 NEED FOR VACCINATION: Primary | ICD-10-CM

## 2021-08-11 PROCEDURE — 0031A COVID-19,VECTOR-NR,RS-AD26,PF,0.5 ML DOSE VACCINE (JANSSEN): ICD-10-PCS | Mod: CV19,S$GLB,, | Performed by: FAMILY MEDICINE

## 2021-08-11 PROCEDURE — 91303 COVID-19,VECTOR-NR,RS-AD26,PF,0.5 ML DOSE VACCINE (JANSSEN): ICD-10-PCS | Mod: S$GLB,,, | Performed by: FAMILY MEDICINE

## 2021-08-11 PROCEDURE — 0031A COVID-19,VECTOR-NR,RS-AD26,PF,0.5 ML DOSE VACCINE (JANSSEN): CPT | Mod: CV19,S$GLB,, | Performed by: FAMILY MEDICINE

## 2021-08-11 PROCEDURE — 91303 COVID-19,VECTOR-NR,RS-AD26,PF,0.5 ML DOSE VACCINE (JANSSEN): CPT | Mod: S$GLB,,, | Performed by: FAMILY MEDICINE

## 2021-08-13 DIAGNOSIS — M79.671 RIGHT FOOT PAIN: Primary | ICD-10-CM

## 2021-08-19 ENCOUNTER — OFFICE VISIT (OUTPATIENT)
Dept: ORTHOPEDICS | Facility: CLINIC | Age: 56
End: 2021-08-19
Payer: COMMERCIAL

## 2021-08-19 ENCOUNTER — HOSPITAL ENCOUNTER (OUTPATIENT)
Dept: RADIOLOGY | Facility: HOSPITAL | Age: 56
Discharge: HOME | End: 2021-08-19
Attending: ORTHOPAEDIC SURGERY
Payer: COMMERCIAL

## 2021-08-19 VITALS — WEIGHT: 231.06 LBS | BODY MASS INDEX: 33.08 KG/M2 | HEIGHT: 70 IN | RESPIRATION RATE: 16 BRPM

## 2021-08-19 DIAGNOSIS — M79.671 RIGHT FOOT PAIN: ICD-10-CM

## 2021-08-19 DIAGNOSIS — M77.41 METATARSALGIA OF RIGHT FOOT: Primary | ICD-10-CM

## 2021-08-19 PROCEDURE — 99999 PR PBB SHADOW E&M-EST. PATIENT-LVL III: CPT | Mod: PBBFAC,,, | Performed by: ORTHOPAEDIC SURGERY

## 2021-08-19 PROCEDURE — 73630 X-RAY EXAM OF FOOT: CPT | Mod: TC,PN,RT

## 2021-08-19 PROCEDURE — 1159F PR MEDICATION LIST DOCUMENTED IN MEDICAL RECORD: ICD-10-PCS | Mod: CPTII,S$GLB,, | Performed by: ORTHOPAEDIC SURGERY

## 2021-08-19 PROCEDURE — 1160F PR REVIEW ALL MEDS BY PRESCRIBER/CLIN PHARMACIST DOCUMENTED: ICD-10-PCS | Mod: CPTII,S$GLB,, | Performed by: ORTHOPAEDIC SURGERY

## 2021-08-19 PROCEDURE — 3008F BODY MASS INDEX DOCD: CPT | Mod: CPTII,S$GLB,, | Performed by: ORTHOPAEDIC SURGERY

## 2021-08-19 PROCEDURE — 1160F RVW MEDS BY RX/DR IN RCRD: CPT | Mod: CPTII,S$GLB,, | Performed by: ORTHOPAEDIC SURGERY

## 2021-08-19 PROCEDURE — 3044F HG A1C LEVEL LT 7.0%: CPT | Mod: CPTII,S$GLB,, | Performed by: ORTHOPAEDIC SURGERY

## 2021-08-19 PROCEDURE — 99999 PR PBB SHADOW E&M-EST. PATIENT-LVL III: ICD-10-PCS | Mod: PBBFAC,,, | Performed by: ORTHOPAEDIC SURGERY

## 2021-08-19 PROCEDURE — 99213 OFFICE O/P EST LOW 20 MIN: CPT | Mod: S$GLB,,, | Performed by: ORTHOPAEDIC SURGERY

## 2021-08-19 PROCEDURE — 1159F MED LIST DOCD IN RCRD: CPT | Mod: CPTII,S$GLB,, | Performed by: ORTHOPAEDIC SURGERY

## 2021-08-19 PROCEDURE — 73630 XR FOOT COMPLETE 3 VIEW RIGHT: ICD-10-PCS | Mod: 26,RT,, | Performed by: RADIOLOGY

## 2021-08-19 PROCEDURE — 3044F PR MOST RECENT HEMOGLOBIN A1C LEVEL <7.0%: ICD-10-PCS | Mod: CPTII,S$GLB,, | Performed by: ORTHOPAEDIC SURGERY

## 2021-08-19 PROCEDURE — 99213 PR OFFICE/OUTPT VISIT, EST, LEVL III, 20-29 MIN: ICD-10-PCS | Mod: S$GLB,,, | Performed by: ORTHOPAEDIC SURGERY

## 2021-08-19 PROCEDURE — 73630 X-RAY EXAM OF FOOT: CPT | Mod: 26,RT,, | Performed by: RADIOLOGY

## 2021-08-19 PROCEDURE — 3008F PR BODY MASS INDEX (BMI) DOCUMENTED: ICD-10-PCS | Mod: CPTII,S$GLB,, | Performed by: ORTHOPAEDIC SURGERY

## 2021-08-27 ENCOUNTER — TELEPHONE (OUTPATIENT)
Dept: INTERNAL MEDICINE | Facility: CLINIC | Age: 56
End: 2021-08-27

## 2021-08-27 DIAGNOSIS — Z20.822 ENCOUNTER FOR LABORATORY TESTING FOR COVID-19 VIRUS: ICD-10-CM

## 2021-09-01 ENCOUNTER — OFFICE VISIT (OUTPATIENT)
Dept: URGENT CARE | Facility: CLINIC | Age: 56
End: 2021-09-01
Payer: COMMERCIAL

## 2021-09-01 VITALS
OXYGEN SATURATION: 93 % | RESPIRATION RATE: 17 BRPM | HEART RATE: 86 BPM | DIASTOLIC BLOOD PRESSURE: 92 MMHG | SYSTOLIC BLOOD PRESSURE: 142 MMHG | TEMPERATURE: 98 F

## 2021-09-01 DIAGNOSIS — Z11.52 ENCOUNTER FOR SCREENING FOR COVID-19: Primary | ICD-10-CM

## 2021-09-01 DIAGNOSIS — R03.0 ELEVATED BLOOD PRESSURE READING: ICD-10-CM

## 2021-09-01 LAB
CTP QC/QA: YES
SARS-COV-2 AG RESP QL IA.RAPID: NEGATIVE

## 2021-09-01 PROCEDURE — 1159F MED LIST DOCD IN RCRD: CPT | Mod: S$GLB,,, | Performed by: NURSE PRACTITIONER

## 2021-09-01 PROCEDURE — 3080F PR MOST RECENT DIASTOLIC BLOOD PRESSURE >= 90 MM HG: ICD-10-PCS | Mod: S$GLB,,, | Performed by: NURSE PRACTITIONER

## 2021-09-01 PROCEDURE — 3077F PR MOST RECENT SYSTOLIC BLOOD PRESSURE >= 140 MM HG: ICD-10-PCS | Mod: S$GLB,,, | Performed by: NURSE PRACTITIONER

## 2021-09-01 PROCEDURE — 87426 SARSCOV CORONAVIRUS AG IA: CPT | Mod: QW,S$GLB,, | Performed by: NURSE PRACTITIONER

## 2021-09-01 PROCEDURE — 99203 OFFICE O/P NEW LOW 30 MIN: CPT | Mod: S$GLB,,, | Performed by: NURSE PRACTITIONER

## 2021-09-01 PROCEDURE — 3044F HG A1C LEVEL LT 7.0%: CPT | Mod: S$GLB,,, | Performed by: NURSE PRACTITIONER

## 2021-09-01 PROCEDURE — 3080F DIAST BP >= 90 MM HG: CPT | Mod: S$GLB,,, | Performed by: NURSE PRACTITIONER

## 2021-09-01 PROCEDURE — 1160F PR REVIEW ALL MEDS BY PRESCRIBER/CLIN PHARMACIST DOCUMENTED: ICD-10-PCS | Mod: S$GLB,,, | Performed by: NURSE PRACTITIONER

## 2021-09-01 PROCEDURE — 1159F PR MEDICATION LIST DOCUMENTED IN MEDICAL RECORD: ICD-10-PCS | Mod: S$GLB,,, | Performed by: NURSE PRACTITIONER

## 2021-09-01 PROCEDURE — 1160F RVW MEDS BY RX/DR IN RCRD: CPT | Mod: S$GLB,,, | Performed by: NURSE PRACTITIONER

## 2021-09-01 PROCEDURE — 87426 SARS CORONAVIRUS 2 ANTIGEN POCT: ICD-10-PCS | Mod: QW,S$GLB,, | Performed by: NURSE PRACTITIONER

## 2021-09-01 PROCEDURE — 99203 PR OFFICE/OUTPT VISIT, NEW, LEVL III, 30-44 MIN: ICD-10-PCS | Mod: S$GLB,,, | Performed by: NURSE PRACTITIONER

## 2021-09-01 PROCEDURE — 3077F SYST BP >= 140 MM HG: CPT | Mod: S$GLB,,, | Performed by: NURSE PRACTITIONER

## 2021-09-01 PROCEDURE — 3044F PR MOST RECENT HEMOGLOBIN A1C LEVEL <7.0%: ICD-10-PCS | Mod: S$GLB,,, | Performed by: NURSE PRACTITIONER

## 2021-09-02 ENCOUNTER — PATIENT MESSAGE (OUTPATIENT)
Dept: INTERNAL MEDICINE | Facility: CLINIC | Age: 56
End: 2021-09-02

## 2021-10-29 ENCOUNTER — OFFICE VISIT (OUTPATIENT)
Dept: URGENT CARE | Facility: CLINIC | Age: 56
End: 2021-10-29
Payer: COMMERCIAL

## 2021-10-29 VITALS
WEIGHT: 231 LBS | SYSTOLIC BLOOD PRESSURE: 155 MMHG | RESPIRATION RATE: 16 BRPM | HEIGHT: 70 IN | HEART RATE: 97 BPM | DIASTOLIC BLOOD PRESSURE: 87 MMHG | TEMPERATURE: 98 F | OXYGEN SATURATION: 96 % | BODY MASS INDEX: 33.07 KG/M2

## 2021-10-29 DIAGNOSIS — Z20.822 CONTACT WITH AND (SUSPECTED) EXPOSURE TO COVID-19: Primary | ICD-10-CM

## 2021-10-29 LAB
CTP QC/QA: YES
SARS-COV-2 AG RESP QL IA.RAPID: NEGATIVE

## 2021-10-29 PROCEDURE — 99213 PR OFFICE/OUTPT VISIT, EST, LEVL III, 20-29 MIN: ICD-10-PCS | Mod: S$GLB,,, | Performed by: NURSE PRACTITIONER

## 2021-10-29 PROCEDURE — 87426 SARSCOV CORONAVIRUS AG IA: CPT | Mod: QW,S$GLB,, | Performed by: NURSE PRACTITIONER

## 2021-10-29 PROCEDURE — 99213 OFFICE O/P EST LOW 20 MIN: CPT | Mod: S$GLB,,, | Performed by: NURSE PRACTITIONER

## 2021-10-29 PROCEDURE — 3008F BODY MASS INDEX DOCD: CPT | Mod: S$GLB,,, | Performed by: NURSE PRACTITIONER

## 2021-10-29 PROCEDURE — 1159F MED LIST DOCD IN RCRD: CPT | Mod: S$GLB,,, | Performed by: NURSE PRACTITIONER

## 2021-10-29 PROCEDURE — 3077F SYST BP >= 140 MM HG: CPT | Mod: S$GLB,,, | Performed by: NURSE PRACTITIONER

## 2021-10-29 PROCEDURE — 1160F PR REVIEW ALL MEDS BY PRESCRIBER/CLIN PHARMACIST DOCUMENTED: ICD-10-PCS | Mod: S$GLB,,, | Performed by: NURSE PRACTITIONER

## 2021-10-29 PROCEDURE — 1160F RVW MEDS BY RX/DR IN RCRD: CPT | Mod: S$GLB,,, | Performed by: NURSE PRACTITIONER

## 2021-10-29 PROCEDURE — 87426 SARS CORONAVIRUS 2 ANTIGEN POCT: ICD-10-PCS | Mod: QW,S$GLB,, | Performed by: NURSE PRACTITIONER

## 2021-10-29 PROCEDURE — 3008F PR BODY MASS INDEX (BMI) DOCUMENTED: ICD-10-PCS | Mod: S$GLB,,, | Performed by: NURSE PRACTITIONER

## 2021-10-29 PROCEDURE — 1159F PR MEDICATION LIST DOCUMENTED IN MEDICAL RECORD: ICD-10-PCS | Mod: S$GLB,,, | Performed by: NURSE PRACTITIONER

## 2021-10-29 PROCEDURE — 3077F PR MOST RECENT SYSTOLIC BLOOD PRESSURE >= 140 MM HG: ICD-10-PCS | Mod: S$GLB,,, | Performed by: NURSE PRACTITIONER

## 2021-10-29 PROCEDURE — 3079F DIAST BP 80-89 MM HG: CPT | Mod: S$GLB,,, | Performed by: NURSE PRACTITIONER

## 2021-10-29 PROCEDURE — 3044F HG A1C LEVEL LT 7.0%: CPT | Mod: S$GLB,,, | Performed by: NURSE PRACTITIONER

## 2021-10-29 PROCEDURE — 3079F PR MOST RECENT DIASTOLIC BLOOD PRESSURE 80-89 MM HG: ICD-10-PCS | Mod: S$GLB,,, | Performed by: NURSE PRACTITIONER

## 2021-10-29 PROCEDURE — 3044F PR MOST RECENT HEMOGLOBIN A1C LEVEL <7.0%: ICD-10-PCS | Mod: S$GLB,,, | Performed by: NURSE PRACTITIONER

## 2022-01-09 DIAGNOSIS — I70.0 AORTIC ATHEROSCLEROSIS: ICD-10-CM

## 2022-01-09 NOTE — TELEPHONE ENCOUNTER
No new care gaps identified.  Powered by [employer]. Reference number: [id number].   1/09/2022 8:01:38 AM CST

## 2022-01-13 RX ORDER — ROSUVASTATIN CALCIUM 20 MG/1
TABLET, COATED ORAL
Qty: 90 TABLET | Refills: 0 | Status: SHIPPED | OUTPATIENT
Start: 2022-01-13 | End: 2022-04-06

## 2022-01-13 NOTE — TELEPHONE ENCOUNTER
Refill Authorization Note   Narciso Mckeon  is requesting a refill authorization.  Brief Assessment and Rationale for Refill:  Approve     Medication Therapy Plan:       Medication Reconciliation Completed: No   Comments:   --->Care Gap information included below if applicable.       Requested Prescriptions   Pending Prescriptions Disp Refills    rosuvastatin (CRESTOR) 20 MG tablet [Pharmacy Med Name: rosuvastatin 20 mg tablet] 90 tablet 0     Sig: TAKE ONE TABLET BY MOUTH EVERY EVENING       Cardiovascular:  Antilipid - Statins Passed - 1/9/2022  8:01 AM        Passed - Patient is at least 18 years old        Passed - Valid encounter within last 15 months     Recent Visits  Date Type Provider Dept   02/17/21 Office Visit Migdalia Connor MD [facility]   Showing recent visits within past 720 days and meeting all other requirements  Future Appointments  No visits were found meeting these conditions.  Showing future appointments within next 150 days and meeting all other requirements      Future Appointments              In 1 week MD Kwame Vann - Endocrinology, Diabetes & Metabolism, [place]    In 1 month Aurelia Gambino MD [facility]                Passed - ALT is 131 or below and within 360 days     ALT   Date Value Ref Range Status   07/19/2021 33 10 - 44 U/L Final   01/13/2021 55 (H) 10 - 44 U/L Final   07/06/2020 27 10 - 44 U/L Final              Passed - AST is 119 or below and within 360 days     AST   Date Value Ref Range Status   07/19/2021 21 10 - 40 U/L Final   01/13/2021 34 10 - 40 U/L Final   07/06/2020 23 10 - 40 U/L Final              Passed - Total Cholesterol within 360 days     Lab Results   Component Value Date    CHOL 165 01/20/2021    CHOL 184 08/17/2020    CHOL 191 01/03/2020              Passed - LDL within 360 days     LDL Cholesterol   Date Value Ref Range Status   01/20/2021 98.2 63.0 - 159.0 mg/dL Final     Comment:     The  National Cholesterol Education Program (NCEP) has set the  following guidelines (reference values) for LDL Cholesterol:  Optimal.......................<130 mg/dL  Borderline High...............130-159 mg/dL  High..........................160-189 mg/dL  Very High.....................>190 mg/dL              Passed - HDL within 360 days     HDL   Date Value Ref Range Status   01/20/2021 52 40 - 75 mg/dL Final     Comment:     The National Cholesterol Education Program (NCEP) has set the  following guidelines (reference values) for HDL Cholesterol:  Low...............<40 mg/dL  Optimal...........>60 mg/dL              Passed - Triglycerides within 360 days     Lab Results   Component Value Date    TRIG 74 01/20/2021    TRIG 180 (H) 08/17/2020    TRIG 156 (H) 01/03/2020                  Appointments  past 12m or future 3m with PCP    Date Provider   Last Visit   2/17/2021 Migdalia Connor MD   Next Visit   Visit date not found Migdalia Connor MD   ED visits in past 90 days: 0     Note composed:8:10 AM 01/13/2022

## 2022-01-25 ENCOUNTER — OFFICE VISIT (OUTPATIENT)
Dept: ENDOCRINOLOGY | Facility: CLINIC | Age: 57
End: 2022-01-25
Payer: COMMERCIAL

## 2022-01-25 ENCOUNTER — LAB VISIT (OUTPATIENT)
Dept: LAB | Facility: HOSPITAL | Age: 57
End: 2022-01-25
Attending: INTERNAL MEDICINE
Payer: COMMERCIAL

## 2022-01-25 VITALS
DIASTOLIC BLOOD PRESSURE: 79 MMHG | BODY MASS INDEX: 33.77 KG/M2 | HEIGHT: 70 IN | OXYGEN SATURATION: 97 % | TEMPERATURE: 98 F | HEART RATE: 78 BPM | WEIGHT: 235.88 LBS | SYSTOLIC BLOOD PRESSURE: 126 MMHG

## 2022-01-25 DIAGNOSIS — K74.00 LIVER FIBROSIS: ICD-10-CM

## 2022-01-25 DIAGNOSIS — E04.1 NODULAR THYROID DISEASE: Primary | ICD-10-CM

## 2022-01-25 DIAGNOSIS — E06.3 HASHIMOTO'S DISEASE: ICD-10-CM

## 2022-01-25 DIAGNOSIS — K76.0 NAFLD (NONALCOHOLIC FATTY LIVER DISEASE): ICD-10-CM

## 2022-01-25 DIAGNOSIS — Z86.39 HISTORY OF GRAVES' DISEASE: ICD-10-CM

## 2022-01-25 DIAGNOSIS — R97.8 ABNORMAL TUMOR MARKERS: ICD-10-CM

## 2022-01-25 DIAGNOSIS — E55.9 HYPOVITAMINOSIS D: ICD-10-CM

## 2022-01-25 DIAGNOSIS — E78.00 HYPERCHOLESTEROLEMIA: ICD-10-CM

## 2022-01-25 DIAGNOSIS — R73.09 DYSGLYCEMIA: ICD-10-CM

## 2022-01-25 DIAGNOSIS — E66.9 OBESITY (BMI 30-39.9): ICD-10-CM

## 2022-01-25 DIAGNOSIS — E04.9 GOITER: ICD-10-CM

## 2022-01-25 DIAGNOSIS — R79.89 ABNORMAL THYROID BLOOD TEST: ICD-10-CM

## 2022-01-25 DIAGNOSIS — Z86.39 HISTORY OF THYROTOXICOSIS: ICD-10-CM

## 2022-01-25 DIAGNOSIS — E78.5 HYPERLIPIDEMIA, UNSPECIFIED HYPERLIPIDEMIA TYPE: ICD-10-CM

## 2022-01-25 DIAGNOSIS — R79.89 ELEVATED FERRITIN LEVEL: ICD-10-CM

## 2022-01-25 DIAGNOSIS — N40.0 BENIGN PROSTATIC HYPERPLASIA, UNSPECIFIED WHETHER LOWER URINARY TRACT SYMPTOMS PRESENT: ICD-10-CM

## 2022-01-25 DIAGNOSIS — E88.810 DYSMETABOLIC SYNDROME: ICD-10-CM

## 2022-01-25 LAB
AFP SERPL-MCNC: 5.8 NG/ML (ref 0–8.4)
ALBUMIN SERPL BCP-MCNC: 4.4 G/DL (ref 3.5–5.2)
ALP SERPL-CCNC: 119 U/L (ref 55–135)
ALT SERPL W/O P-5'-P-CCNC: 55 U/L (ref 10–44)
ANION GAP SERPL CALC-SCNC: 8 MMOL/L (ref 8–16)
AST SERPL-CCNC: 31 U/L (ref 10–40)
BASOPHILS # BLD AUTO: 0.03 K/UL (ref 0–0.2)
BASOPHILS NFR BLD: 0.5 % (ref 0–1.9)
BILIRUB SERPL-MCNC: 1 MG/DL (ref 0.1–1)
BUN SERPL-MCNC: 13 MG/DL (ref 6–20)
CALCIUM SERPL-MCNC: 9.9 MG/DL (ref 8.7–10.5)
CHLORIDE SERPL-SCNC: 105 MMOL/L (ref 95–110)
CHOLEST SERPL-MCNC: 182 MG/DL (ref 120–199)
CHOLEST/HDLC SERPL: 3.6 {RATIO} (ref 2–5)
CO2 SERPL-SCNC: 22 MMOL/L (ref 23–29)
CREAT SERPL-MCNC: 0.9 MG/DL (ref 0.5–1.4)
DIFFERENTIAL METHOD: ABNORMAL
EOSINOPHIL # BLD AUTO: 0.1 K/UL (ref 0–0.5)
EOSINOPHIL NFR BLD: 1.6 % (ref 0–8)
ERYTHROCYTE [DISTWIDTH] IN BLOOD BY AUTOMATED COUNT: 12.8 % (ref 11.5–14.5)
EST. GFR  (AFRICAN AMERICAN): >60 ML/MIN/1.73 M^2
EST. GFR  (NON AFRICAN AMERICAN): >60 ML/MIN/1.73 M^2
ESTIMATED AVG GLUCOSE: 85 MG/DL (ref 68–131)
FERRITIN SERPL-MCNC: 450 NG/ML (ref 20–300)
GLUCOSE SERPL-MCNC: 91 MG/DL (ref 70–110)
HBA1C MFR BLD: 4.6 % (ref 4–5.6)
HCT VFR BLD AUTO: 46.1 % (ref 40–54)
HDLC SERPL-MCNC: 51 MG/DL (ref 40–75)
HDLC SERPL: 28 % (ref 20–50)
HGB BLD-MCNC: 15.7 G/DL (ref 14–18)
IMM GRANULOCYTES # BLD AUTO: 0.01 K/UL (ref 0–0.04)
IMM GRANULOCYTES NFR BLD AUTO: 0.2 % (ref 0–0.5)
LDLC SERPL CALC-MCNC: 88.8 MG/DL (ref 63–159)
LYMPHOCYTES # BLD AUTO: 2.2 K/UL (ref 1–4.8)
LYMPHOCYTES NFR BLD: 34.2 % (ref 18–48)
MCH RBC QN AUTO: 34.3 PG (ref 27–31)
MCHC RBC AUTO-ENTMCNC: 34.1 G/DL (ref 32–36)
MCV RBC AUTO: 101 FL (ref 82–98)
MONOCYTES # BLD AUTO: 0.5 K/UL (ref 0.3–1)
MONOCYTES NFR BLD: 8.4 % (ref 4–15)
NEUTROPHILS # BLD AUTO: 3.5 K/UL (ref 1.8–7.7)
NEUTROPHILS NFR BLD: 55.1 % (ref 38–73)
NONHDLC SERPL-MCNC: 131 MG/DL
NRBC BLD-RTO: 0 /100 WBC
PLATELET # BLD AUTO: 228 K/UL (ref 150–450)
PMV BLD AUTO: 9.7 FL (ref 9.2–12.9)
POTASSIUM SERPL-SCNC: 4.2 MMOL/L (ref 3.5–5.1)
PROT SERPL-MCNC: 7.9 G/DL (ref 6–8.4)
RBC # BLD AUTO: 4.58 M/UL (ref 4.6–6.2)
SODIUM SERPL-SCNC: 135 MMOL/L (ref 136–145)
TRIGL SERPL-MCNC: 211 MG/DL (ref 30–150)
TSH SERPL DL<=0.005 MIU/L-ACNC: 1.44 UIU/ML (ref 0.4–4)
URATE SERPL-MCNC: 6.9 MG/DL (ref 3.4–7)
WBC # BLD AUTO: 6.28 K/UL (ref 3.9–12.7)

## 2022-01-25 PROCEDURE — 80053 COMPREHEN METABOLIC PANEL: CPT | Performed by: INTERNAL MEDICINE

## 2022-01-25 PROCEDURE — 84443 ASSAY THYROID STIM HORMONE: CPT | Performed by: INTERNAL MEDICINE

## 2022-01-25 PROCEDURE — 3078F PR MOST RECENT DIASTOLIC BLOOD PRESSURE < 80 MM HG: ICD-10-PCS | Mod: CPTII,S$GLB,, | Performed by: INTERNAL MEDICINE

## 2022-01-25 PROCEDURE — 99214 PR OFFICE/OUTPT VISIT, EST, LEVL IV, 30-39 MIN: ICD-10-PCS | Mod: S$GLB,,, | Performed by: INTERNAL MEDICINE

## 2022-01-25 PROCEDURE — 82728 ASSAY OF FERRITIN: CPT | Performed by: INTERNAL MEDICINE

## 2022-01-25 PROCEDURE — 3008F BODY MASS INDEX DOCD: CPT | Mod: CPTII,S$GLB,, | Performed by: INTERNAL MEDICINE

## 2022-01-25 PROCEDURE — 99214 OFFICE O/P EST MOD 30 MIN: CPT | Mod: S$GLB,,, | Performed by: INTERNAL MEDICINE

## 2022-01-25 PROCEDURE — 1159F PR MEDICATION LIST DOCUMENTED IN MEDICAL RECORD: ICD-10-PCS | Mod: CPTII,S$GLB,, | Performed by: INTERNAL MEDICINE

## 2022-01-25 PROCEDURE — 84153 ASSAY OF PSA TOTAL: CPT | Performed by: INTERNAL MEDICINE

## 2022-01-25 PROCEDURE — 84550 ASSAY OF BLOOD/URIC ACID: CPT | Performed by: INTERNAL MEDICINE

## 2022-01-25 PROCEDURE — 3008F PR BODY MASS INDEX (BMI) DOCUMENTED: ICD-10-PCS | Mod: CPTII,S$GLB,, | Performed by: INTERNAL MEDICINE

## 2022-01-25 PROCEDURE — 82105 ALPHA-FETOPROTEIN SERUM: CPT | Performed by: INTERNAL MEDICINE

## 2022-01-25 PROCEDURE — 99999 PR PBB SHADOW E&M-EST. PATIENT-LVL IV: ICD-10-PCS | Mod: PBBFAC,,, | Performed by: INTERNAL MEDICINE

## 2022-01-25 PROCEDURE — 36415 COLL VENOUS BLD VENIPUNCTURE: CPT | Mod: PO | Performed by: INTERNAL MEDICINE

## 2022-01-25 PROCEDURE — 1159F MED LIST DOCD IN RCRD: CPT | Mod: CPTII,S$GLB,, | Performed by: INTERNAL MEDICINE

## 2022-01-25 PROCEDURE — 1160F RVW MEDS BY RX/DR IN RCRD: CPT | Mod: CPTII,S$GLB,, | Performed by: INTERNAL MEDICINE

## 2022-01-25 PROCEDURE — 83036 HEMOGLOBIN GLYCOSYLATED A1C: CPT | Performed by: INTERNAL MEDICINE

## 2022-01-25 PROCEDURE — 82977 ASSAY OF GGT: CPT | Performed by: INTERNAL MEDICINE

## 2022-01-25 PROCEDURE — 85025 COMPLETE CBC W/AUTO DIFF WBC: CPT | Performed by: INTERNAL MEDICINE

## 2022-01-25 PROCEDURE — 3074F PR MOST RECENT SYSTOLIC BLOOD PRESSURE < 130 MM HG: ICD-10-PCS | Mod: CPTII,S$GLB,, | Performed by: INTERNAL MEDICINE

## 2022-01-25 PROCEDURE — 80061 LIPID PANEL: CPT | Performed by: INTERNAL MEDICINE

## 2022-01-25 PROCEDURE — 3078F DIAST BP <80 MM HG: CPT | Mod: CPTII,S$GLB,, | Performed by: INTERNAL MEDICINE

## 2022-01-25 PROCEDURE — 83605 ASSAY OF LACTIC ACID: CPT | Performed by: INTERNAL MEDICINE

## 2022-01-25 PROCEDURE — 3074F SYST BP LT 130 MM HG: CPT | Mod: CPTII,S$GLB,, | Performed by: INTERNAL MEDICINE

## 2022-01-25 PROCEDURE — 1160F PR REVIEW ALL MEDS BY PRESCRIBER/CLIN PHARMACIST DOCUMENTED: ICD-10-PCS | Mod: CPTII,S$GLB,, | Performed by: INTERNAL MEDICINE

## 2022-01-25 PROCEDURE — 82140 ASSAY OF AMMONIA: CPT | Performed by: INTERNAL MEDICINE

## 2022-01-25 PROCEDURE — 99999 PR PBB SHADOW E&M-EST. PATIENT-LVL IV: CPT | Mod: PBBFAC,,, | Performed by: INTERNAL MEDICINE

## 2022-01-25 NOTE — PROGRESS NOTES
Subjective:      Patient ID: Narciso Mckeon is a 56 y.o. male.    Chief Complaint:    Thyroid Nodule     Patient is a 56 yr old gentleman with history of post treatment Graves disease seen in [facility] today    History of Present Illness    The patient, Mr Mckeon is a 56 yr old gentleman with history of Graves disease with hyperthyroidism seen in [facility] today. He had been managed with long term methimazole therapy and had previously been seen on this account with Dr Geovanni Watson and Dr Huerta. He is presently of this medication though and his last TFTs confirmed biochemical euthyroid state.  His  thyroid USS from 05/19 showed stable thyroid nodular disease presently not at the threshold for biopsy.  His latest  thyroid USS  from  05/21 showed sonographic stability of the thyroid nodules and thus his next USS should be for ~ 05/23 as he has had serial sonographic surveillance with stability for ~ 5 yrs prior..  Patient has a background epworth score of 9. Patient has polysomogram done ~ 2 yrs ago and   Showed  RLS with loud snoring but no apnea.  He continues to have intermittent neck tightness but no dysphagia nor dyspnea.  No persistent palpitations. He continues to have intermittent excessive tearing in the right eye.  Patient sees Dr Freire on this account.  Patient has been of methimazole now for ~ 2 yrs.  There is no family history of thyroid disease.   Patient was born and raised in [facility]. Patient has no history of residence outside the country.  Patients does does not contain excessive amounts of sea food, cabbage or soy products.  Patient stopped smoking in 2014 but had smoked for 20 yrs before.  Patient drinks ~ 3 beers /week.     The 10-year ASCVD risk score (Joyce JUANITA Jr., et al., 2013) is: 3.7%    Values used to calculate the score:      Age: 52 years      Sex: Male      Is Non- : No      Diabetic: No      Tobacco smoker: No      Systolic Blood Pressure: 117 mmHg      Is  "BP treated: No      HDL Cholesterol: 50 mg/dL      Total Cholesterol: 189 mg/dL      Patients  fibroscan from 05/19 showed F0 and stage 2 hepatic steatosis.  Patients latest fibroscan below (from 01/21);    Findings  Median liver stiffness score:  6.3  CAP Reading: dB/m:  256     IQR/med %:  17  Interpretation  Fibrosis interpretation is based on medial liver stiffness - Kilopascal (kPa).     Fibrosis Stage:  F 0-1  Steatosis interpretation is based on controlled attenuation parameter - (dB/m).     Steatosis Grade:  S1  This shows significant interval improvement in his prior fatty liver.    Patient has no fresh complaints today.   Patient has gained ~ 13 lbs since the start of 2021 but has been doing a lot of resistance exercise with increased muscle mass.         Review of Systems   Constitutional: Negative for chills, diaphoresis, fatigue and unexpected weight change.   HENT: Negative for facial swelling, hearing loss, trouble swallowing and voice change.    Eyes: Negative for photophobia and visual disturbance.   Respiratory: Negative for cough and shortness of breath.    Cardiovascular: Negative for chest pain, palpitations and leg swelling.   Gastrointestinal: Negative for nausea and vomiting.   Endocrine: Negative for polyphagia and polyuria.   Genitourinary: Negative for difficulty urinating and dysuria.   Musculoskeletal: Positive for arthralgias (mainly in feet) and back pain (chronic and stable).   Skin: Negative for color change, pallor and rash.   Neurological: Negative for headaches.   Hematological: Does not bruise/bleed easily.   Psychiatric/Behavioral: Negative for confusion and sleep disturbance. The patient is not nervous/anxious.        Objective:     /79 (BP Location: Left arm, Patient Position: Sitting, BP Method: Large (Manual))   Pulse 78   Temp 98.1 °F (36.7 °C) (Oral)   Ht 5' 10" (1.778 m)   Wt 107 kg (235 lb 14.3 oz)   SpO2 97%   BMI 33.85 kg/m²   Body surface area is 2.3 " meters squared.         Physical Exam  Vitals reviewed.   Constitutional:       General: He is not in acute distress.     Appearance: He is well-developed. He is not toxic-appearing or diaphoretic.      Comments: Pleasant middle aged gentleman. Not pale, anicteric and afebrile. Well hydrated and not in any acute distress.Patient is in much better mood today that at last visit and does appear noticeably more muscular.   HENT:      Head: Normocephalic and atraumatic. No right periorbital erythema or left periorbital erythema. Hair is normal.      Mouth/Throat:      Pharynx: No oropharyngeal exudate.   Eyes:      General: Lids are normal. Lids are everted, no foreign bodies appreciated. No scleral icterus.     Conjunctiva/sclera: Conjunctivae normal.      Right eye: Right conjunctiva is not injected.      Left eye: Left conjunctiva is not injected.      Pupils: Pupils are equal, round, and reactive to light.      Comments: No proptosis and no diplopia   Neck:      Thyroid: No thyroid mass or thyromegaly.      Vascular: No carotid bruit or JVD.      Trachea: Trachea and phonation normal. No tracheal tenderness or tracheal deviation.   Cardiovascular:      Rate and Rhythm: Normal rate and regular rhythm.      Pulses: Normal pulses.      Heart sounds: Normal heart sounds. No murmur heard.  No gallop.    Pulmonary:      Effort: Pulmonary effort is normal. No respiratory distress.      Breath sounds: Normal breath sounds. No stridor. No decreased breath sounds, wheezing, rhonchi or rales.   Abdominal:      General: Bowel sounds are normal. There is no distension.      Palpations: Abdomen is soft. There is no mass.      Tenderness: There is no abdominal tenderness.   Musculoskeletal:         General: No swelling or tenderness. Normal range of motion.      Cervical back: Full passive range of motion without pain, normal range of motion and neck supple.      Comments: No pedal edema. No digital clubbing nor thyroid acropachy.  No pretibial myxedema but has fine tremor of outstretched hands   Lymphadenopathy:      Cervical: No cervical adenopathy.   Skin:     General: Skin is warm and dry.      Coloration: Skin is not jaundiced or pale.      Findings: No bruising, ecchymosis, erythema, petechiae or rash.      Nails: There is no clubbing.      Comments: Diffuse xerosis   Neurological:      General: No focal deficit present.      Mental Status: He is alert and oriented to person, place, and time.      Cranial Nerves: No cranial nerve deficit.      Sensory: No sensory deficit.      Motor: No tremor or abnormal muscle tone.      Gait: Gait normal.      Deep Tendon Reflexes: Reflexes are normal and symmetric. Reflexes normal.   Psychiatric:         Mood and Affect: Mood is not anxious or depressed.         Speech: Speech normal.         Behavior: Behavior normal.         Thought Content: Thought content normal.         Judgment: Judgment normal.         Lab Review:     Results for LIZETTE REILLY (MRN [id number]) as of 1/25/2022 14:38   Ref. Range 7/19/2021 17:39 8/19/2021 14:36 9/1/2021 17:26 10/29/2021 09:17   WBC Latest Ref Range: 3.90 - 12.70 K/uL 5.93      RBC Latest Ref Range: 4.60 - 6.20 M/uL 4.05 (L)      Hemoglobin Latest Ref Range: 14.0 - 18.0 g/dL 14.4      Hematocrit Latest Ref Range: 40.0 - 54.0 % 40.7      MCV Latest Ref Range: 82 - 98 fL 101 (H)      MCH Latest Ref Range: 27.0 - 31.0 pg 35.6 (H)      MCHC Latest Ref Range: 32.0 - 36.0 g/dL 35.4      RDW Latest Ref Range: 11.5 - 14.5 % 13.0      Platelets Latest Ref Range: 150 - 450 K/uL 167      MPV Latest Ref Range: 9.2 - 12.9 fL 9.4      Gran % Latest Ref Range: 38.0 - 73.0 % 58.3      Lymph % Latest Ref Range: 18.0 - 48.0 % 29.5      Mono % Latest Ref Range: 4.0 - 15.0 % 9.9      Eosinophil % Latest Ref Range: 0.0 - 8.0 % 1.5      Basophil % Latest Ref Range: 0.0 - 1.9 % 0.5      Immature Granulocytes Latest Ref Range: 0.0 - 0.5 % 0.3      Gran # (ANC) Latest Ref Range:  1.8 - 7.7 K/uL 3.5      Lymph # Latest Ref Range: 1.0 - 4.8 K/uL 1.8      Mono # Latest Ref Range: 0.3 - 1.0 K/uL 0.6      Eos # Latest Ref Range: 0.0 - 0.5 K/uL 0.1      Baso # Latest Ref Range: 0.00 - 0.20 K/uL 0.03      Immature Grans (Abs) Latest Ref Range: 0.00 - 0.04 K/uL 0.02      nRBC Latest Ref Range: 0 /100 WBC 0      Differential Method Unknown Automated      Sodium Latest Ref Range: 136 - 145 mmol/L 144      Potassium Latest Ref Range: 3.5 - 5.1 mmol/L 4.5      Chloride Latest Ref Range: 95 - 110 mmol/L 108      CO2 Latest Ref Range: 23 - 29 mmol/L 28      Anion Gap Latest Ref Range: 8 - 16 mmol/L 8      BUN Latest Ref Range: 6 - 20 mg/dL 20      Creatinine Latest Ref Range: 0.5 - 1.4 mg/dL 1.1      eGFR if non African American Latest Ref Range: >60 mL/min/1.73 m^2 >60      eGFR if African American Latest Ref Range: >60 mL/min/1.73 m^2 >60      Glucose Latest Ref Range: 70 - 110 mg/dL 95      Calcium Latest Ref Range: 8.7 - 10.5 mg/dL 9.9      Alkaline Phosphatase Latest Ref Range: 55 - 135 U/L 86      PROTEIN TOTAL Latest Ref Range: 6.0 - 8.4 g/dL 7.7      Albumin Latest Ref Range: 3.5 - 5.2 g/dL 4.6      BILIRUBIN TOTAL Latest Ref Range: 0.1 - 1.0 mg/dL 1.1 (H)      AST Latest Ref Range: 10 - 40 U/L 21      ALT Latest Ref Range: 10 - 44 U/L 33      Ammonia Latest Ref Range: 10 - 50 umol/L 40      Lactate, Saul Latest Ref Range: 0.5 - 2.2 mmol/L 1.1      TSH Latest Ref Range: 0.400 - 4.000 uIU/mL 1.849      T3, Total Latest Ref Range: 60 - 180 ng/dL 108      Free T4 Latest Ref Range: 0.71 - 1.51 ng/dL 0.92      Thyroglobulin Interpretation Unknown SEE BELOW      Thyroglobulin Antibody Screen Latest Ref Range: <1.8 IU/mL 40 (H)      Thyroglobulin, Tumor Marker Latest Units: ng/mL 0.5 (H)       Acceptable Unknown   Yes Yes   XR FOOT COMPLETE 3 VIEW RIGHT Unknown  Rpt     SARS Coronavirus 2 Antigen Latest Ref Range: Negative    Negative Negative       Assessment:     1. Nodular thyroid  disease     2. Hashimoto's disease     3. History of thyrotoxicosis     4. Abnormal thyroid blood test     5. Goiter     6. History of Graves' disease     7. Dysmetabolic syndrome  Comprehensive Metabolic Panel    CBC Auto Differential    Uric Acid    Hemoglobin A1C   8. Hypovitaminosis D     9. Obesity (BMI 30-39.9)     10. NAFLD (nonalcoholic fatty liver disease)  Comprehensive Metabolic Panel    CBC Auto Differential    Lipid Panel    Hemoglobin A1C    Ferritin    Lactic Acid, Plasma    Ammonia    Gamma GT    AFP Tumor Marker   11. Liver fibrosis, F2  Ferritin   12. Elevated ferritin level  Ferritin   13. Hyperlipidemia, unspecified hyperlipidemia type  Comprehensive Metabolic Panel    Uric Acid    Lipid Panel   14. Hypercholesterolemia  Comprehensive Metabolic Panel    Uric Acid    Lipid Panel   15. Benign prostatic hyperplasia, unspecified whether lower urinary tract symptoms present  TSH    PSA, Total and Free   16. Dysglycemia  Hemoglobin A1C   17. Abnormal tumor markers  AFP Tumor Marker        Regarding Graves disease; appears to be clinical quiescent at the moment as far as thyroid activity. Will recheck full TFTs and thyroid antibody profile.  Regarding thyroid nodular disease; to obtain ffup thyroid USS ~ 05/2023.  Regarding possible opthalmopathy; to continue formal opthalmic referral to evaluate for this.  Depending on the current status as regards antibody titers and presence or absence of orbitopathy may decide on permanent treatment for Graves.  His hx of NAFLD essentially exclude methimazole or PTU use as an option and his recent history of long term smoking makes radio iodine ablation a less than optimal option. Elective thyroidectomy would be the preferred management option for him and I have informed him as much but will await results of latest labs as detailed above.  Regarding NAFLD; ongoing ffup as per hepatology group and rec as regarding ETOH intake as per hepatology team. To start vit E  supplements and will check current HBA1c to guide decision re possible addition of metformin.  Regarding hyperlipidemia; to continue antilipidemic therapy as before.    Due to the current nation wide acute severe supply chain deficit in blood, urine and other lab sample tubes and collection containers, typical labs will not be obtained in the usual profile and  frequency they were obtained in time past for clinical surveillance, investigation, monitoring and/or management.    Plan:     FFup in ~ 6mths

## 2022-01-26 LAB
AMMONIA PLAS-SCNC: 67 UMOL/L (ref 10–50)
GGT SERPL-CCNC: 402 U/L (ref 8–55)
LACTATE SERPL-SCNC: 0.8 MMOL/L (ref 0.5–2.2)
PROSTATE SPECIFIC ANTIGEN, TOTAL: 2 NG/ML (ref 0–4)
PSA FREE MFR SERPL: 17.5 %
PSA FREE SERPL-MCNC: 0.35 NG/ML (ref 0–1.5)

## 2022-02-16 ENCOUNTER — OFFICE VISIT (OUTPATIENT)
Dept: FAMILY MEDICINE | Facility: CLINIC | Age: 57
End: 2022-02-16
Payer: COMMERCIAL

## 2022-02-16 VITALS
OXYGEN SATURATION: 96 % | DIASTOLIC BLOOD PRESSURE: 80 MMHG | HEART RATE: 118 BPM | HEIGHT: 70 IN | RESPIRATION RATE: 18 BRPM | WEIGHT: 226.63 LBS | TEMPERATURE: 99 F | BODY MASS INDEX: 32.45 KG/M2 | SYSTOLIC BLOOD PRESSURE: 114 MMHG

## 2022-02-16 DIAGNOSIS — M54.41 CHRONIC BILATERAL LOW BACK PAIN WITH RIGHT-SIDED SCIATICA: ICD-10-CM

## 2022-02-16 DIAGNOSIS — E55.9 HYPOVITAMINOSIS D: ICD-10-CM

## 2022-02-16 DIAGNOSIS — E06.3 HASHIMOTO'S DISEASE: ICD-10-CM

## 2022-02-16 DIAGNOSIS — E05.90 HYPERTHYROIDISM: ICD-10-CM

## 2022-02-16 DIAGNOSIS — G89.29 CHRONIC BILATERAL LOW BACK PAIN WITH RIGHT-SIDED SCIATICA: ICD-10-CM

## 2022-02-16 DIAGNOSIS — M79.671 PAIN IN BOTH FEET: ICD-10-CM

## 2022-02-16 DIAGNOSIS — E04.1 NODULAR THYROID DISEASE: ICD-10-CM

## 2022-02-16 DIAGNOSIS — R42 VERTIGO: Primary | ICD-10-CM

## 2022-02-16 DIAGNOSIS — H40.89 OTHER SPECIFIED GLAUCOMA, UNSPECIFIED LATERALITY: ICD-10-CM

## 2022-02-16 DIAGNOSIS — E88.810 DYSMETABOLIC SYNDROME: ICD-10-CM

## 2022-02-16 DIAGNOSIS — G25.81 RESTLESS LEG SYNDROME: ICD-10-CM

## 2022-02-16 DIAGNOSIS — M79.672 PAIN IN BOTH FEET: ICD-10-CM

## 2022-02-16 DIAGNOSIS — K76.0 NAFLD (NONALCOHOLIC FATTY LIVER DISEASE): ICD-10-CM

## 2022-02-16 DIAGNOSIS — E78.2 MIXED HYPERLIPIDEMIA: ICD-10-CM

## 2022-02-16 DIAGNOSIS — K74.00 LIVER FIBROSIS: ICD-10-CM

## 2022-02-16 PROBLEM — R79.89 ABNORMAL THYROID BLOOD TEST: Status: RESOLVED | Noted: 2018-05-25 | Resolved: 2022-02-16

## 2022-02-16 PROBLEM — E07.9 THYROID DISEASE: Status: RESOLVED | Noted: 2018-05-25 | Resolved: 2022-02-16

## 2022-02-16 PROBLEM — Z86.39 HISTORY OF GRAVES' DISEASE: Status: RESOLVED | Noted: 2020-08-17 | Resolved: 2022-02-16

## 2022-02-16 PROCEDURE — 1159F PR MEDICATION LIST DOCUMENTED IN MEDICAL RECORD: ICD-10-PCS | Mod: S$GLB,,, | Performed by: STUDENT IN AN ORGANIZED HEALTH CARE EDUCATION/TRAINING PROGRAM

## 2022-02-16 PROCEDURE — 1159F MED LIST DOCD IN RCRD: CPT | Mod: S$GLB,,, | Performed by: STUDENT IN AN ORGANIZED HEALTH CARE EDUCATION/TRAINING PROGRAM

## 2022-02-16 PROCEDURE — 99999 PR PBB SHADOW E&M-EST. PATIENT-LVL IV: ICD-10-PCS | Mod: PBBFAC,,, | Performed by: STUDENT IN AN ORGANIZED HEALTH CARE EDUCATION/TRAINING PROGRAM

## 2022-02-16 PROCEDURE — 3044F PR MOST RECENT HEMOGLOBIN A1C LEVEL <7.0%: ICD-10-PCS | Mod: S$GLB,,, | Performed by: STUDENT IN AN ORGANIZED HEALTH CARE EDUCATION/TRAINING PROGRAM

## 2022-02-16 PROCEDURE — 3044F HG A1C LEVEL LT 7.0%: CPT | Mod: S$GLB,,, | Performed by: STUDENT IN AN ORGANIZED HEALTH CARE EDUCATION/TRAINING PROGRAM

## 2022-02-16 PROCEDURE — 99999 PR PBB SHADOW E&M-EST. PATIENT-LVL IV: CPT | Mod: PBBFAC,,, | Performed by: STUDENT IN AN ORGANIZED HEALTH CARE EDUCATION/TRAINING PROGRAM

## 2022-02-16 PROCEDURE — 1160F PR REVIEW ALL MEDS BY PRESCRIBER/CLIN PHARMACIST DOCUMENTED: ICD-10-PCS | Mod: S$GLB,,, | Performed by: STUDENT IN AN ORGANIZED HEALTH CARE EDUCATION/TRAINING PROGRAM

## 2022-02-16 PROCEDURE — 3074F PR MOST RECENT SYSTOLIC BLOOD PRESSURE < 130 MM HG: ICD-10-PCS | Mod: S$GLB,,, | Performed by: STUDENT IN AN ORGANIZED HEALTH CARE EDUCATION/TRAINING PROGRAM

## 2022-02-16 PROCEDURE — 1160F RVW MEDS BY RX/DR IN RCRD: CPT | Mod: S$GLB,,, | Performed by: STUDENT IN AN ORGANIZED HEALTH CARE EDUCATION/TRAINING PROGRAM

## 2022-02-16 PROCEDURE — 3008F PR BODY MASS INDEX (BMI) DOCUMENTED: ICD-10-PCS | Mod: S$GLB,,, | Performed by: STUDENT IN AN ORGANIZED HEALTH CARE EDUCATION/TRAINING PROGRAM

## 2022-02-16 PROCEDURE — 3079F PR MOST RECENT DIASTOLIC BLOOD PRESSURE 80-89 MM HG: ICD-10-PCS | Mod: S$GLB,,, | Performed by: STUDENT IN AN ORGANIZED HEALTH CARE EDUCATION/TRAINING PROGRAM

## 2022-02-16 PROCEDURE — 99214 OFFICE O/P EST MOD 30 MIN: CPT | Mod: S$GLB,,, | Performed by: STUDENT IN AN ORGANIZED HEALTH CARE EDUCATION/TRAINING PROGRAM

## 2022-02-16 PROCEDURE — 3079F DIAST BP 80-89 MM HG: CPT | Mod: S$GLB,,, | Performed by: STUDENT IN AN ORGANIZED HEALTH CARE EDUCATION/TRAINING PROGRAM

## 2022-02-16 PROCEDURE — 99214 PR OFFICE/OUTPT VISIT, EST, LEVL IV, 30-39 MIN: ICD-10-PCS | Mod: S$GLB,,, | Performed by: STUDENT IN AN ORGANIZED HEALTH CARE EDUCATION/TRAINING PROGRAM

## 2022-02-16 PROCEDURE — 3074F SYST BP LT 130 MM HG: CPT | Mod: S$GLB,,, | Performed by: STUDENT IN AN ORGANIZED HEALTH CARE EDUCATION/TRAINING PROGRAM

## 2022-02-16 PROCEDURE — 3008F BODY MASS INDEX DOCD: CPT | Mod: S$GLB,,, | Performed by: STUDENT IN AN ORGANIZED HEALTH CARE EDUCATION/TRAINING PROGRAM

## 2022-02-16 NOTE — PATIENT INSTRUCTIONS

## 2022-02-16 NOTE — PROGRESS NOTES
Subjective:       Patient ID: Narciso Mckeon is a 56 y.o. male.    Chief Complaint: Establish Care    Establish care for the following:    Patient Active Problem List:     Graves disease     Vertigo     Hyperthyroidism     BATRES (dyspnea on exertion)     Hyperlipidemia     Abnormal liver function tests     NAFLD (nonalcoholic fatty liver disease)     Goiter     Thyroiditis     Chronic low back pain     Nodular thyroid disease     Hashimoto's disease     Hx of colonic polyps     History of thyrotoxicosis     Arthritis of knee     Dysmetabolic syndrome     Hypovitaminosis D     Obesity (BMI 30-39.9)     Liver fibrosis, F2     Aortic atherosclerosis     Other specified glaucoma     Pain in both feet     Restless leg syndrome      Current Outpatient Medications:  aspirin (ECOTRIN) 81 MG EC tablet, TAKE ONE TABLET BY MOUTH ONCE DAILY  b complex vitamins tablet, Take 1 tablet by mouth once daily.  betamethasone dipropionate (DIPROLENE) 0.05 % ointment, AAA hand BID PRN flare  clobetasol (TEMOVATE) 0.05 % cream, Thin film to AA hands BID PRN flare  gabapentin (NEURONTIN) 300 MG capsule, Take 1 capsule (300 mg total) by mouth 3 (three) times daily.  meclizine (ANTIVERT) 25 mg tablet, TAKE ONE TABLET BY MOUTH THREE TIMES DAILY AS NEEDED  meloxicam (MOBIC) 15 MG tablet, Take 15 mg by mouth once daily.  morphine (MS CONTIN) 15 MG 12 hr tablet, Take 15 mg by mouth as needed.  multivitamin capsule, Take 1 capsule by mouth once daily.  oxyCODONE (ROXICODONE) 15 MG Tab, Take 15 mg by mouth daily as needed.  rosuvastatin (CRESTOR) 20 MG tablet, TAKE ONE TABLET BY MOUTH EVERY EVENING  tizanidine (ZANAFLEX) 4 MG tablet, Take 2 mg by mouth every 8 (eight) hours.   vitamin E 200 UNIT capsule, Take 200 Units by mouth once daily.    No current facility-administered medications for this visit.      He is seeing hepatology/transplant team  Pain management, orthopedics  And endocrinology    Overall doing very well.        Review of  Systems   Constitutional: Negative for activity change, appetite change, fatigue and unexpected weight change.   HENT: Negative for trouble swallowing.    Respiratory: Negative for shortness of breath.    Cardiovascular: Negative for chest pain and leg swelling.   Gastrointestinal: Negative for abdominal pain, blood in stool, change in bowel habit and change in bowel habit.   Endocrine: Negative for cold intolerance, heat intolerance, polydipsia and polyuria.   Genitourinary: Negative for decreased urine volume, difficulty urinating, dysuria, frequency, hematuria and urgency.   Musculoskeletal: Positive for arthralgias and back pain. Negative for gait problem.   Integumentary:  Negative for rash and wound.   Neurological: Negative for dizziness, weakness and headaches.   Psychiatric/Behavioral: Negative for dysphoric mood and sleep disturbance.         Objective:      Physical Exam  Constitutional:       General: He is not in acute distress.     Appearance: Normal appearance. He is not ill-appearing.   Eyes:      Conjunctiva/sclera: Conjunctivae normal.   Cardiovascular:      Rate and Rhythm: Normal rate and regular rhythm.      Pulses: Normal pulses.      Heart sounds: Normal heart sounds. No murmur heard.      Pulmonary:      Effort: Pulmonary effort is normal. No respiratory distress.      Breath sounds: Normal breath sounds. No wheezing.   Abdominal:      General: There is no distension.      Palpations: Abdomen is soft. There is no mass.      Tenderness: There is no abdominal tenderness.   Musculoskeletal:      Right lower leg: No edema.      Left lower leg: No edema.   Skin:     General: Skin is warm and dry.      Findings: No rash.   Neurological:      Mental Status: He is alert. Mental status is at baseline.      Gait: Gait normal.   Psychiatric:         Mood and Affect: Mood normal.         Behavior: Behavior normal.         Thought Content: Thought content normal.         Judgment: Judgment normal.          Assessment:       1. Vertigo    2. Mixed hyperlipidemia    3. Hyperthyroidism    4. Hashimoto's disease    5. Dysmetabolic syndrome    6. Hypovitaminosis D    7. Nodular thyroid disease    8. NAFLD (nonalcoholic fatty liver disease)    9. Liver fibrosis, F2    10. Chronic bilateral low back pain with right-sided sciatica    11. Other specified glaucoma, unspecified laterality    12. Pain in both feet    13. Restless leg syndrome        Plan:       Problem List Items Addressed This Visit        Neuro    Restless leg syndrome       Ophtho    Other specified glaucoma       ENT    Vertigo - Primary       Cardiac/Vascular    Hyperlipidemia       Endocrine    Hyperthyroidism    Nodular thyroid disease    Hashimoto's disease    Dysmetabolic syndrome    Hypovitaminosis D       GI    NAFLD (nonalcoholic fatty liver disease)    Liver fibrosis, F2       Orthopedic    Chronic low back pain    Pain in both feet              Overall stable  Continue current medications  No changes.   Keep followup with specialists  Reviewed labs

## 2022-02-23 DIAGNOSIS — D84.9 IMMUNOSUPPRESSED STATUS: ICD-10-CM

## 2022-03-03 ENCOUNTER — OFFICE VISIT (OUTPATIENT)
Dept: URGENT CARE | Facility: CLINIC | Age: 57
End: 2022-03-03
Payer: COMMERCIAL

## 2022-03-03 VITALS
HEART RATE: 70 BPM | DIASTOLIC BLOOD PRESSURE: 83 MMHG | SYSTOLIC BLOOD PRESSURE: 132 MMHG | TEMPERATURE: 98 F | OXYGEN SATURATION: 92 %

## 2022-03-03 DIAGNOSIS — Z11.52 ENCOUNTER FOR SCREENING FOR COVID-19: Primary | ICD-10-CM

## 2022-03-03 DIAGNOSIS — Z20.822 LAB TEST NEGATIVE FOR COVID-19 VIRUS: ICD-10-CM

## 2022-03-03 LAB
CTP QC/QA: YES
SARS-COV-2 AG RESP QL IA.RAPID: NEGATIVE

## 2022-03-03 PROCEDURE — 1160F PR REVIEW ALL MEDS BY PRESCRIBER/CLIN PHARMACIST DOCUMENTED: ICD-10-PCS | Mod: S$GLB,,, | Performed by: STUDENT IN AN ORGANIZED HEALTH CARE EDUCATION/TRAINING PROGRAM

## 2022-03-03 PROCEDURE — 3075F SYST BP GE 130 - 139MM HG: CPT | Mod: S$GLB,,, | Performed by: STUDENT IN AN ORGANIZED HEALTH CARE EDUCATION/TRAINING PROGRAM

## 2022-03-03 PROCEDURE — 3079F DIAST BP 80-89 MM HG: CPT | Mod: S$GLB,,, | Performed by: STUDENT IN AN ORGANIZED HEALTH CARE EDUCATION/TRAINING PROGRAM

## 2022-03-03 PROCEDURE — 1159F MED LIST DOCD IN RCRD: CPT | Mod: S$GLB,,, | Performed by: STUDENT IN AN ORGANIZED HEALTH CARE EDUCATION/TRAINING PROGRAM

## 2022-03-03 PROCEDURE — 1159F PR MEDICATION LIST DOCUMENTED IN MEDICAL RECORD: ICD-10-PCS | Mod: S$GLB,,, | Performed by: STUDENT IN AN ORGANIZED HEALTH CARE EDUCATION/TRAINING PROGRAM

## 2022-03-03 PROCEDURE — 3044F HG A1C LEVEL LT 7.0%: CPT | Mod: S$GLB,,, | Performed by: STUDENT IN AN ORGANIZED HEALTH CARE EDUCATION/TRAINING PROGRAM

## 2022-03-03 PROCEDURE — 3079F PR MOST RECENT DIASTOLIC BLOOD PRESSURE 80-89 MM HG: ICD-10-PCS | Mod: S$GLB,,, | Performed by: STUDENT IN AN ORGANIZED HEALTH CARE EDUCATION/TRAINING PROGRAM

## 2022-03-03 PROCEDURE — 3044F PR MOST RECENT HEMOGLOBIN A1C LEVEL <7.0%: ICD-10-PCS | Mod: S$GLB,,, | Performed by: STUDENT IN AN ORGANIZED HEALTH CARE EDUCATION/TRAINING PROGRAM

## 2022-03-03 PROCEDURE — 1160F RVW MEDS BY RX/DR IN RCRD: CPT | Mod: S$GLB,,, | Performed by: STUDENT IN AN ORGANIZED HEALTH CARE EDUCATION/TRAINING PROGRAM

## 2022-03-03 PROCEDURE — 99213 PR OFFICE/OUTPT VISIT, EST, LEVL III, 20-29 MIN: ICD-10-PCS | Mod: S$GLB,,, | Performed by: STUDENT IN AN ORGANIZED HEALTH CARE EDUCATION/TRAINING PROGRAM

## 2022-03-03 PROCEDURE — 3075F PR MOST RECENT SYSTOLIC BLOOD PRESS GE 130-139MM HG: ICD-10-PCS | Mod: S$GLB,,, | Performed by: STUDENT IN AN ORGANIZED HEALTH CARE EDUCATION/TRAINING PROGRAM

## 2022-03-03 PROCEDURE — 87811 SARS CORONAVIRUS 2 ANTIGEN POCT, MANUAL READ: ICD-10-PCS | Mod: S$GLB,,, | Performed by: STUDENT IN AN ORGANIZED HEALTH CARE EDUCATION/TRAINING PROGRAM

## 2022-03-03 PROCEDURE — 99213 OFFICE O/P EST LOW 20 MIN: CPT | Mod: S$GLB,,, | Performed by: STUDENT IN AN ORGANIZED HEALTH CARE EDUCATION/TRAINING PROGRAM

## 2022-03-03 PROCEDURE — 87811 SARS-COV-2 COVID19 W/OPTIC: CPT | Mod: S$GLB,,, | Performed by: STUDENT IN AN ORGANIZED HEALTH CARE EDUCATION/TRAINING PROGRAM

## 2022-03-03 NOTE — PROGRESS NOTES
Subjective:       Patient ID: Narciso Mckeon is a 56 y.o. male.    Vitals:  oral temperature is 98.2 °F (36.8 °C). His blood pressure is 132/83 and his pulse is 70. His oxygen saturation is 92% (abnormal).     Chief Complaint: COVID-19 Concerns    Patient is a 56-year-old male with past medical history of hyperlipidemia, hypothyroidism, NAFLD, and arthritis who presents to clinic for COVID testing.  Patient reports he has vaccinated for COVID. Patient denies any acute symptoms.  Patient states needs a negative COVID test for travel.      Constitution: Negative. Negative for chills, sweating, fatigue and fever.   HENT: Negative.  Negative for ear pain, congestion and sore throat.    Neck: neck negative.   Cardiovascular: Negative.  Negative for chest pain and palpitations.   Eyes: Negative.    Respiratory: Negative.  Negative for chest tightness, cough and shortness of breath.    Gastrointestinal: Negative.  Negative for abdominal pain, nausea, vomiting and diarrhea.   Endocrine: negative.   Genitourinary: Negative.    Musculoskeletal: Negative.  Negative for muscle ache.   Skin: Negative.  Negative for color change, pale, rash and erythema.   Allergic/Immunologic: Negative.    Neurological: Negative for dizziness, light-headedness, passing out, headaches, disorientation and altered mental status.   Hematologic/Lymphatic: Negative.    Psychiatric/Behavioral: Negative.  Negative for altered mental status, disorientation and confusion.       Objective:      Physical Exam   Constitutional: He is oriented to person, place, and time. He appears well-developed. He is cooperative.  Non-toxic appearance. He does not appear ill. No distress.   HENT:   Head: Normocephalic and atraumatic.   Ears:   Right Ear: Hearing, tympanic membrane, external ear and ear canal normal.   Left Ear: Hearing, tympanic membrane, external ear and ear canal normal.   Nose: Nose normal. No mucosal edema, rhinorrhea or nasal deformity. No  epistaxis. Right sinus exhibits no maxillary sinus tenderness and no frontal sinus tenderness. Left sinus exhibits no maxillary sinus tenderness and no frontal sinus tenderness.   Mouth/Throat: Uvula is midline, oropharynx is clear and moist and mucous membranes are normal. No trismus in the jaw. Normal dentition. No uvula swelling. No posterior oropharyngeal erythema.   Eyes: Conjunctivae and lids are normal. Pupils are equal, round, and reactive to light. Right eye exhibits no discharge. Left eye exhibits no discharge. No scleral icterus.   Neck: Trachea normal and phonation normal. Neck supple.   Cardiovascular: Normal rate, regular rhythm, normal heart sounds and normal pulses.   Pulmonary/Chest: Effort normal and breath sounds normal. No respiratory distress. He has no wheezes. He has no rales.   Abdominal: Normal appearance and bowel sounds are normal. He exhibits no distension. Soft. There is no abdominal tenderness.   Musculoskeletal: Normal range of motion.         General: No deformity. Normal range of motion.   Neurological: He is alert and oriented to person, place, and time. He exhibits normal muscle tone. Coordination normal.   Skin: Skin is warm, dry, intact, not diaphoretic, not pale and no rash. Capillary refill takes less than 2 seconds. No erythema   Psychiatric: His speech is normal and behavior is normal. Judgment and thought content normal.   Nursing note and vitals reviewed.        Assessment:       1. Encounter for screening for COVID-19    2. Lab test negative for COVID-19 virus          Plan:         Encounter for screening for COVID-19  -     SARS Coronavirus 2 Antigen, POCT Manual Read    Lab test negative for COVID-19 virus                 Labs:  COVID negative.  Follow-up with PCP as previously scheduled and as needed.  Return to clinic as needed.  To ED for any new acute worsening symptoms.  Patient in agreement with plan of care.

## 2022-03-08 ENCOUNTER — TELEPHONE (OUTPATIENT)
Dept: DERMATOLOGY | Facility: CLINIC | Age: 57
End: 2022-03-08
Payer: COMMERCIAL

## 2022-03-08 NOTE — TELEPHONE ENCOUNTER
Attempted to contact patient to schedule, line just beeped.     Attempted to contact patients wife, Anya, lvm for ar return call.     ----- Message from Tiki Tracey LPN sent at 3/8/2022 12:51 PM CST -----  Contact: pt/ wife  Pt lives in [place]. They want an appt for next week for painful lesion under L arm. Can you help get this appt made? I didn't want to mess with her slidell schedule.  ----- Message -----  From: Nery Blanco  Sent: 3/8/2022  11:31 AM CST  To: Micah OBRIEN Staff ([place])    Type: Needs Medical Advice    Who Called: pt/ wife      Best Call Back Number: [phone]       Requesting a call back regarding - pt is out of town will be make on monday and need to come in ASAP  Moles under left arm are painful        Please Advise- Thank you

## 2022-03-09 ENCOUNTER — PATIENT MESSAGE (OUTPATIENT)
Dept: DERMATOLOGY | Facility: CLINIC | Age: 57
End: 2022-03-09
Payer: COMMERCIAL

## 2022-03-09 ENCOUNTER — TELEPHONE (OUTPATIENT)
Dept: DERMATOLOGY | Facility: CLINIC | Age: 57
End: 2022-03-09
Payer: COMMERCIAL

## 2022-03-09 NOTE — TELEPHONE ENCOUNTER
Patient is currently in [place], will return on Monday - will call back then.     ----- Message from Lizbet Briggs sent at 3/8/2022  1:21 PM CST -----  Regarding: returning call  Contact: patient  Type:  Patient Returning Call    Who Called:  patient  Who Left Message for Patient:  Ligia  Does the patient know what this is regarding?:  appointment  Best Call Back Number:  [phone]   Additional Information:  Please call patient. Thanks!

## 2022-03-17 ENCOUNTER — OFFICE VISIT (OUTPATIENT)
Dept: DERMATOLOGY | Facility: CLINIC | Age: 57
End: 2022-03-17
Payer: COMMERCIAL

## 2022-03-17 VITALS — WEIGHT: 226 LBS | BODY MASS INDEX: 32.35 KG/M2 | HEIGHT: 70 IN

## 2022-03-17 DIAGNOSIS — D18.01 CHERRY ANGIOMA: ICD-10-CM

## 2022-03-17 DIAGNOSIS — L82.1 SEBORRHEIC KERATOSES: ICD-10-CM

## 2022-03-17 DIAGNOSIS — D22.9 MULTIPLE BENIGN NEVI: ICD-10-CM

## 2022-03-17 DIAGNOSIS — L91.8 SKIN TAGS, MULTIPLE ACQUIRED: Primary | ICD-10-CM

## 2022-03-17 DIAGNOSIS — L73.8 SEBACEOUS HYPERPLASIA OF FACE: ICD-10-CM

## 2022-03-17 PROCEDURE — 1160F RVW MEDS BY RX/DR IN RCRD: CPT | Mod: CPTII,S$GLB,, | Performed by: DERMATOLOGY

## 2022-03-17 PROCEDURE — 99213 OFFICE O/P EST LOW 20 MIN: CPT | Mod: S$GLB,,, | Performed by: DERMATOLOGY

## 2022-03-17 PROCEDURE — 99213 PR OFFICE/OUTPT VISIT, EST, LEVL III, 20-29 MIN: ICD-10-PCS | Mod: S$GLB,,, | Performed by: DERMATOLOGY

## 2022-03-17 PROCEDURE — 1159F PR MEDICATION LIST DOCUMENTED IN MEDICAL RECORD: ICD-10-PCS | Mod: CPTII,S$GLB,, | Performed by: DERMATOLOGY

## 2022-03-17 PROCEDURE — 3008F PR BODY MASS INDEX (BMI) DOCUMENTED: ICD-10-PCS | Mod: CPTII,S$GLB,, | Performed by: DERMATOLOGY

## 2022-03-17 PROCEDURE — 99999 PR PBB SHADOW E&M-EST. PATIENT-LVL III: CPT | Mod: PBBFAC,,, | Performed by: DERMATOLOGY

## 2022-03-17 PROCEDURE — 3044F HG A1C LEVEL LT 7.0%: CPT | Mod: CPTII,S$GLB,, | Performed by: DERMATOLOGY

## 2022-03-17 PROCEDURE — 3044F PR MOST RECENT HEMOGLOBIN A1C LEVEL <7.0%: ICD-10-PCS | Mod: CPTII,S$GLB,, | Performed by: DERMATOLOGY

## 2022-03-17 PROCEDURE — 3008F BODY MASS INDEX DOCD: CPT | Mod: CPTII,S$GLB,, | Performed by: DERMATOLOGY

## 2022-03-17 PROCEDURE — 1160F PR REVIEW ALL MEDS BY PRESCRIBER/CLIN PHARMACIST DOCUMENTED: ICD-10-PCS | Mod: CPTII,S$GLB,, | Performed by: DERMATOLOGY

## 2022-03-17 PROCEDURE — 1159F MED LIST DOCD IN RCRD: CPT | Mod: CPTII,S$GLB,, | Performed by: DERMATOLOGY

## 2022-03-17 PROCEDURE — 99999 PR PBB SHADOW E&M-EST. PATIENT-LVL III: ICD-10-PCS | Mod: PBBFAC,,, | Performed by: DERMATOLOGY

## 2022-03-17 NOTE — PROGRESS NOTES
Subjective:       Patient ID:  Narciso Mckeon is a 56 y.o. male who presents for   Chief Complaint   Patient presents with    Skin Check     UBSE     LOV: 10/18/19 - SK, skin tags, lentigo, hand dermatitis, nevi, angioma, seb hyper    Skin Check - UBSE    C/o spot under left axilla  Was very inflamed, fell off    Derm Hx:  Denies phx NMSC/MM  + fhx NMSC  Denies fhx MM    Current Outpatient Medications:     aspirin (ECOTRIN) 81 MG EC tablet, TAKE ONE TABLET BY MOUTH ONCE DAILY, Disp: 90 tablet, Rfl: 3    b complex vitamins tablet, Take 1 tablet by mouth once daily., Disp: , Rfl:     betamethasone dipropionate (DIPROLENE) 0.05 % ointment, AAA hand BID PRN flare, Disp: 45 g, Rfl: 1    clobetasol (TEMOVATE) 0.05 % cream, Thin film to AA hands BID PRN flare, Disp: 45 g, Rfl: 1    gabapentin (NEURONTIN) 300 MG capsule, Take 1 capsule (300 mg total) by mouth 3 (three) times daily., Disp: 90 capsule, Rfl: 0    meclizine (ANTIVERT) 25 mg tablet, TAKE ONE TABLET BY MOUTH THREE TIMES DAILY AS NEEDED, Disp: 30 tablet, Rfl: 1    meloxicam (MOBIC) 15 MG tablet, Take 15 mg by mouth once daily., Disp: , Rfl:     morphine (MS CONTIN) 15 MG 12 hr tablet, Take 15 mg by mouth as needed., Disp: , Rfl:     multivitamin capsule, Take 1 capsule by mouth once daily., Disp: , Rfl:     oxyCODONE (ROXICODONE) 15 MG Tab, Take 15 mg by mouth daily as needed., Disp: , Rfl:     rosuvastatin (CRESTOR) 20 MG tablet, TAKE ONE TABLET BY MOUTH EVERY EVENING, Disp: 90 tablet, Rfl: 0    tizanidine (ZANAFLEX) 4 MG tablet, Take 2 mg by mouth every 8 (eight) hours. , Disp: , Rfl:     vitamin E 200 UNIT capsule, Take 200 Units by mouth once daily., Disp: , Rfl:         Review of Systems   Constitutional: Negative for fever, chills and fatigue.   Skin: Positive for activity-related sunscreen use and wears hat. Negative for daily sunscreen use.   Hematologic/Lymphatic: Bruises/bleeds easily.        Objective:    Physical Exam    Constitutional: He appears well-developed and well-nourished. No distress.   Neurological: He is alert and oriented to person, place, and time. He is not disoriented.   Psychiatric: He has a normal mood and affect.   Skin:   Areas Examined (abnormalities noted in diagram):   Scalp / Hair Palpated and Inspected  Head / Face Inspection Performed  Neck Inspection Performed  Chest / Axilla Inspection Performed  Abdomen Inspection Performed  Back Inspection Performed  RUE Inspected  LUE Inspection Performed  Nails and Digits Inspection Performed                       Diagram Legend     Erythematous scaling macule/papule c/w actinic keratosis       Vascular papule c/w angioma      Pigmented verrucoid papule/plaque c/w seborrheic keratosis      Yellow umbilicated papule c/w sebaceous hyperplasia      Irregularly shaped tan macule c/w lentigo     1-2 mm smooth white papules consistent with Milia      Movable subcutaneous cyst with punctum c/w epidermal inclusion cyst      Subcutaneous movable cyst c/w pilar cyst      Firm pink to brown papule c/w dermatofibroma      Pedunculated fleshy papule(s) c/w skin tag(s)      Evenly pigmented macule c/w junctional nevus     Mildly variegated pigmented, slightly irregular-bordered macule c/w mildly atypical nevus      Flesh colored to evenly pigmented papule c/w intradermal nevus       Pink pearly papule/plaque c/w basal cell carcinoma      Erythematous hyperkeratotic cursted plaque c/w SCC      Surgical scar with no sign of skin cancer recurrence      Open and closed comedones      Inflammatory papules and pustules      Verrucoid papule consistent consistent with wart     Erythematous eczematous patches and plaques     Dystrophic onycholytic nail with subungual debris c/w onychomycosis     Umbilicated papule    Erythematous-base heme-crusted tan verrucoid plaque consistent with inflamed seborrheic keratosis     Erythematous Silvery Scaling Plaque c/w Psoriasis     See  annotation      Assessment / Plan:        Skin tags, multiple acquired  Reassurance given to patient. No treatment is necessary.   Treatment of benign, asymptomatic lesions may be considered cosmetic.    Multiple benign nevi  Careful dermoscopy evaluation of nevi performed with none identified as needing biopsy today  Monitor for new mole or moles that are becoming bigger, darker, irritated, or developing irregular borders.     Cherry angioma  This is a benign vascular lesion. Reassurance given. No treatment required.     Seborrheic keratoses  These are benign inherited growths without a malignant potential. Reassurance given to patient. No treatment is necessary.     Sebaceous hyperplasia of face  This is a common condition representing benign enlargement of the sebaceous lobule. It typically occurs in adulthood. Reassurance given to patient.     Patient instructed in importance in daily broad spectrum sun protection of at least spf 30. Mineral sunscreen ingredients preferred (Zinc +/- Titanium) and can be found OTC.   Recommend Elta MD for daily use on face and neck.  Patient encouraged to wear hat for all outdoor exposure.   Also discussed sun avoidance and use of protective clothing.           Follow up if symptoms worsen or fail to improve.

## 2022-08-17 ENCOUNTER — OFFICE VISIT (OUTPATIENT)
Dept: FAMILY MEDICINE | Facility: CLINIC | Age: 57
End: 2022-08-17
Payer: COMMERCIAL

## 2022-08-17 VITALS
SYSTOLIC BLOOD PRESSURE: 136 MMHG | BODY MASS INDEX: 34.3 KG/M2 | HEIGHT: 70 IN | DIASTOLIC BLOOD PRESSURE: 84 MMHG | OXYGEN SATURATION: 98 % | TEMPERATURE: 99 F | RESPIRATION RATE: 18 BRPM | WEIGHT: 239.63 LBS | HEART RATE: 82 BPM

## 2022-08-17 DIAGNOSIS — E05.00 GRAVES DISEASE: ICD-10-CM

## 2022-08-17 DIAGNOSIS — I70.0 AORTIC ATHEROSCLEROSIS: ICD-10-CM

## 2022-08-17 DIAGNOSIS — G25.81 RESTLESS LEG SYNDROME: Primary | ICD-10-CM

## 2022-08-17 DIAGNOSIS — M54.41 CHRONIC BILATERAL LOW BACK PAIN WITH RIGHT-SIDED SCIATICA: ICD-10-CM

## 2022-08-17 DIAGNOSIS — Z87.891 FORMER SMOKER: ICD-10-CM

## 2022-08-17 DIAGNOSIS — K76.0 NAFLD (NONALCOHOLIC FATTY LIVER DISEASE): ICD-10-CM

## 2022-08-17 DIAGNOSIS — G89.29 CHRONIC BILATERAL LOW BACK PAIN WITH RIGHT-SIDED SCIATICA: ICD-10-CM

## 2022-08-17 DIAGNOSIS — E55.9 HYPOVITAMINOSIS D: ICD-10-CM

## 2022-08-17 DIAGNOSIS — E78.2 MIXED HYPERLIPIDEMIA: ICD-10-CM

## 2022-08-17 PROCEDURE — 3075F PR MOST RECENT SYSTOLIC BLOOD PRESS GE 130-139MM HG: ICD-10-PCS | Mod: S$GLB,,, | Performed by: STUDENT IN AN ORGANIZED HEALTH CARE EDUCATION/TRAINING PROGRAM

## 2022-08-17 PROCEDURE — 3079F DIAST BP 80-89 MM HG: CPT | Mod: S$GLB,,, | Performed by: STUDENT IN AN ORGANIZED HEALTH CARE EDUCATION/TRAINING PROGRAM

## 2022-08-17 PROCEDURE — 3008F PR BODY MASS INDEX (BMI) DOCUMENTED: ICD-10-PCS | Mod: S$GLB,,, | Performed by: STUDENT IN AN ORGANIZED HEALTH CARE EDUCATION/TRAINING PROGRAM

## 2022-08-17 PROCEDURE — 99999 PR PBB SHADOW E&M-EST. PATIENT-LVL V: CPT | Mod: PBBFAC,,, | Performed by: STUDENT IN AN ORGANIZED HEALTH CARE EDUCATION/TRAINING PROGRAM

## 2022-08-17 PROCEDURE — 1159F PR MEDICATION LIST DOCUMENTED IN MEDICAL RECORD: ICD-10-PCS | Mod: S$GLB,,, | Performed by: STUDENT IN AN ORGANIZED HEALTH CARE EDUCATION/TRAINING PROGRAM

## 2022-08-17 PROCEDURE — 99214 PR OFFICE/OUTPT VISIT, EST, LEVL IV, 30-39 MIN: ICD-10-PCS | Mod: S$GLB,,, | Performed by: STUDENT IN AN ORGANIZED HEALTH CARE EDUCATION/TRAINING PROGRAM

## 2022-08-17 PROCEDURE — 3044F HG A1C LEVEL LT 7.0%: CPT | Mod: S$GLB,,, | Performed by: STUDENT IN AN ORGANIZED HEALTH CARE EDUCATION/TRAINING PROGRAM

## 2022-08-17 PROCEDURE — 3075F SYST BP GE 130 - 139MM HG: CPT | Mod: S$GLB,,, | Performed by: STUDENT IN AN ORGANIZED HEALTH CARE EDUCATION/TRAINING PROGRAM

## 2022-08-17 PROCEDURE — 3008F BODY MASS INDEX DOCD: CPT | Mod: S$GLB,,, | Performed by: STUDENT IN AN ORGANIZED HEALTH CARE EDUCATION/TRAINING PROGRAM

## 2022-08-17 PROCEDURE — 3044F PR MOST RECENT HEMOGLOBIN A1C LEVEL <7.0%: ICD-10-PCS | Mod: S$GLB,,, | Performed by: STUDENT IN AN ORGANIZED HEALTH CARE EDUCATION/TRAINING PROGRAM

## 2022-08-17 PROCEDURE — 99999 PR PBB SHADOW E&M-EST. PATIENT-LVL V: ICD-10-PCS | Mod: PBBFAC,,, | Performed by: STUDENT IN AN ORGANIZED HEALTH CARE EDUCATION/TRAINING PROGRAM

## 2022-08-17 PROCEDURE — 1159F MED LIST DOCD IN RCRD: CPT | Mod: S$GLB,,, | Performed by: STUDENT IN AN ORGANIZED HEALTH CARE EDUCATION/TRAINING PROGRAM

## 2022-08-17 PROCEDURE — 1160F RVW MEDS BY RX/DR IN RCRD: CPT | Mod: S$GLB,,, | Performed by: STUDENT IN AN ORGANIZED HEALTH CARE EDUCATION/TRAINING PROGRAM

## 2022-08-17 PROCEDURE — 99214 OFFICE O/P EST MOD 30 MIN: CPT | Mod: S$GLB,,, | Performed by: STUDENT IN AN ORGANIZED HEALTH CARE EDUCATION/TRAINING PROGRAM

## 2022-08-17 PROCEDURE — 1160F PR REVIEW ALL MEDS BY PRESCRIBER/CLIN PHARMACIST DOCUMENTED: ICD-10-PCS | Mod: S$GLB,,, | Performed by: STUDENT IN AN ORGANIZED HEALTH CARE EDUCATION/TRAINING PROGRAM

## 2022-08-17 PROCEDURE — 3079F PR MOST RECENT DIASTOLIC BLOOD PRESSURE 80-89 MM HG: ICD-10-PCS | Mod: S$GLB,,, | Performed by: STUDENT IN AN ORGANIZED HEALTH CARE EDUCATION/TRAINING PROGRAM

## 2022-08-17 NOTE — ASSESSMENT & PLAN NOTE
Stable. Continue current medications and regular followup.  Hyperlipidemia Medications             rosuvastatin (CRESTOR) 20 MG tablet TAKE ONE TABLET BY MOUTH EVERY EVENING

## 2022-08-17 NOTE — PROGRESS NOTES
Subjective:       Patient ID: Narciso Mckeon is a 56 y.o. male.    Chief Complaint: Follow-up    Problem List Items Addressed This Visit        Restless leg syndrome - Primary - not on therapy      Hyperlipidemia   Hyperlipidemia Medications     rosuvastatin (CRESTOR) 20 MG tablet TAKE ONE TABLET BY MOUTH EVERY EVENING    Lab Results       Component                Value               Date                       CHOL                     182                 01/25/2022                 CHOL                     165                 01/20/2021                 CHOL                     184                 08/17/2020            Lab Results       Component                Value               Date                       HDL                      51                  01/25/2022                 HDL                      52                  01/20/2021                 HDL                      48                  08/17/2020            Lab Results       Component                Value               Date                       LDLCALC                  88.8                01/25/2022                 LDLCALC                  98.2                01/20/2021                 LDLCALC                  100.0               08/17/2020            Lab Results       Component                Value               Date                       TRIG                     211 (H)             01/25/2022                 TRIG                     74                  01/20/2021                 TRIG                     180 (H)             08/17/2020            Lab Results       Component                Value               Date                       CHOLHDL                  28.0                01/25/2022                 CHOLHDL                  31.5                01/20/2021                 CHOLHDL                  26.1                08/17/2020               Aortic atherosclerosis - on risk factor management. Aspirin, statin.       Hypovitaminosis D - not on  therapy.       NAFLD (nonalcoholic fatty liver disease)    CMP  Sodium       Date                     Value               Ref Range           Status                01/25/2022               135 (L)             136 - 145 mmol*     Final            ----------  Potassium       Date                     Value               Ref Range           Status                01/25/2022               4.2                 3.5 - 5.1 mmol*     Final            ----------  Chloride       Date                     Value               Ref Range           Status                01/25/2022               105                 95 - 110 mmol/L     Final            ----------  CO2       Date                     Value               Ref Range           Status                01/25/2022               22 (L)              23 - 29 mmol/L      Final            ----------  Glucose       Date                     Value               Ref Range           Status                01/25/2022               91                  70 - 110 mg/dL      Final            ----------  BUN       Date                     Value               Ref Range           Status                01/25/2022               13                  6 - 20 mg/dL        Final            ----------  Creatinine       Date                     Value               Ref Range           Status                01/25/2022               0.9                 0.5 - 1.4 mg/dL     Final            ----------  Calcium       Date                     Value               Ref Range           Status                01/25/2022               9.9                 8.7 - 10.5 mg/*     Final            ----------  Total Protein       Date                     Value               Ref Range           Status                01/25/2022               7.9                 6.0 - 8.4 g/dL      Final            ----------  Albumin       Date                     Value               Ref Range           Status                01/25/2022                4.4                 3.5 - 5.2 g/dL      Final            ----------  Total Bilirubin       Date                     Value               Ref Range           Status                01/25/2022               1.0                 0.1 - 1.0 mg/dL     Final              Comment:    For infants and newborns, interpretation of results should be based  on gestational age, weight and in agreement with clinical  observations.    Premature Infant recommended reference ranges:  Up to 24 hours.............<8.0 mg/dL  Up to 48 hours............<12.0 mg/dL  3-5 days..................<15.0 mg/dL  6-29 days.................<15.0 mg/dL    ----------  Alkaline Phosphatase       Date                     Value               Ref Range           Status                01/25/2022               119                 55 - 135 U/L        Final            ----------  AST       Date                     Value               Ref Range           Status                01/25/2022               31                  10 - 40 U/L         Final            ----------  ALT       Date                     Value               Ref Range           Status                01/25/2022               55 (H)              10 - 44 U/L         Final            ----------  Anion Gap       Date                     Value               Ref Range           Status                01/25/2022               8                   8 - 16 mmol/L       Final            ----------  eGFR if        Date                     Value               Ref Range           Status                01/25/2022               >60.0               >60 mL/min/1.7*     Final            ----------  eGFR if non        Date                     Value               Ref Range           Status                01/25/2022               >60.0               >60 mL/min/1.7*     Final              Comment:    Calculation used to obtain the estimated glomerular filtration  rate (eGFR) is the CKD-EPI  equation.     ----------     Chronic low back pain - seeing chronic pain    Stable and doing well  No acute issues today          Review of Systems   Constitutional: Negative for activity change, appetite change, fatigue and unexpected weight change.   HENT: Negative for trouble swallowing.    Respiratory: Negative for shortness of breath.    Cardiovascular: Negative for chest pain and leg swelling.   Gastrointestinal: Negative for abdominal pain, blood in stool, change in bowel habit and change in bowel habit.   Endocrine: Negative for cold intolerance, heat intolerance, polydipsia and polyuria.   Genitourinary: Negative for decreased urine volume, difficulty urinating, dysuria, frequency, hematuria and urgency.   Musculoskeletal: Negative for arthralgias, back pain and gait problem.   Integumentary:  Negative for rash and wound.   Neurological: Negative for dizziness, weakness and headaches.   Psychiatric/Behavioral: Negative for dysphoric mood and sleep disturbance. The patient is not nervous/anxious.          Objective:      Physical Exam  Constitutional:       General: He is not in acute distress.     Appearance: Normal appearance. He is not ill-appearing.   Eyes:      Conjunctiva/sclera: Conjunctivae normal.   Cardiovascular:      Rate and Rhythm: Normal rate and regular rhythm.      Pulses: Normal pulses.      Heart sounds: Normal heart sounds. No murmur heard.  Pulmonary:      Effort: Pulmonary effort is normal. No respiratory distress.      Breath sounds: Normal breath sounds. No wheezing.   Abdominal:      Palpations: Abdomen is soft.   Musculoskeletal:      Right lower leg: No edema.      Left lower leg: No edema.   Skin:     General: Skin is warm and dry.      Findings: No rash.   Neurological:      Mental Status: He is alert. Mental status is at baseline.      Gait: Gait normal.   Psychiatric:         Mood and Affect: Mood normal.         Behavior: Behavior normal.         Thought Content: Thought  content normal.         Judgment: Judgment normal.         Assessment:       1. Restless leg syndrome    2. Mixed hyperlipidemia    3. Aortic atherosclerosis    4. Chronic bilateral low back pain with right-sided sciatica    5. NAFLD (nonalcoholic fatty liver disease)    6. Hypovitaminosis D    7. Graves disease    8. Former smoker        Plan:       Problem List Items Addressed This Visit        Neuro    Restless leg syndrome - Primary     Stable. Continue current medications and regular followup.                Cardiac/Vascular    Hyperlipidemia     Stable. Continue current medications and regular followup.  Hyperlipidemia Medications             rosuvastatin (CRESTOR) 20 MG tablet TAKE ONE TABLET BY MOUTH EVERY EVENING                   Aortic atherosclerosis     Continue risk factor management  Aspirin, statin              Endocrine    Graves disease     Follows with endocrinology           Hypovitaminosis D     Taking vitamin D              GI    NAFLD (nonalcoholic fatty liver disease)     Stable and doing well.               Orthopedic    Chronic low back pain     Seeing pain management              Other    Former smoker    Relevant Orders    CT Chest Low Dose Diagnostic

## 2022-08-19 ENCOUNTER — HOSPITAL ENCOUNTER (OUTPATIENT)
Dept: RADIOLOGY | Facility: HOSPITAL | Age: 57
Discharge: HOME | End: 2022-08-19
Attending: STUDENT IN AN ORGANIZED HEALTH CARE EDUCATION/TRAINING PROGRAM
Payer: COMMERCIAL

## 2022-08-19 DIAGNOSIS — Z87.891 FORMER SMOKER: ICD-10-CM

## 2022-08-19 PROCEDURE — 71250 CT THORAX DX C-: CPT | Mod: TC

## 2022-08-19 PROCEDURE — 71250 CT LOW DOSE CHEST DIAGNOSTIC: ICD-10-PCS | Mod: 26,,, | Performed by: RADIOLOGY

## 2022-08-19 PROCEDURE — 71250 CT THORAX DX C-: CPT | Mod: 26,,, | Performed by: RADIOLOGY

## 2022-08-26 ENCOUNTER — LAB VISIT (OUTPATIENT)
Dept: LAB | Facility: HOSPITAL | Age: 57
End: 2022-08-26
Attending: INTERNAL MEDICINE
Payer: COMMERCIAL

## 2022-08-26 ENCOUNTER — OFFICE VISIT (OUTPATIENT)
Dept: ENDOCRINOLOGY | Facility: CLINIC | Age: 57
End: 2022-08-26
Payer: COMMERCIAL

## 2022-08-26 VITALS
BODY MASS INDEX: 33.95 KG/M2 | OXYGEN SATURATION: 96 % | DIASTOLIC BLOOD PRESSURE: 76 MMHG | HEART RATE: 101 BPM | TEMPERATURE: 98 F | WEIGHT: 237.13 LBS | SYSTOLIC BLOOD PRESSURE: 120 MMHG | HEIGHT: 70 IN

## 2022-08-26 DIAGNOSIS — E78.2 MIXED HYPERLIPIDEMIA: ICD-10-CM

## 2022-08-26 DIAGNOSIS — Z86.39 HISTORY OF GRAVES' DISEASE: ICD-10-CM

## 2022-08-26 DIAGNOSIS — K76.0 NAFLD (NONALCOHOLIC FATTY LIVER DISEASE): ICD-10-CM

## 2022-08-26 DIAGNOSIS — E55.9 HYPOVITAMINOSIS D: ICD-10-CM

## 2022-08-26 DIAGNOSIS — E04.9 GOITER: ICD-10-CM

## 2022-08-26 DIAGNOSIS — Z86.010 HX OF COLONIC POLYPS: ICD-10-CM

## 2022-08-26 DIAGNOSIS — E04.1 NODULAR THYROID DISEASE: ICD-10-CM

## 2022-08-26 DIAGNOSIS — R79.89 ABNORMAL THYROID BLOOD TEST: ICD-10-CM

## 2022-08-26 DIAGNOSIS — E88.810 DYSMETABOLIC SYNDROME: ICD-10-CM

## 2022-08-26 DIAGNOSIS — E06.9 THYROIDITIS: ICD-10-CM

## 2022-08-26 DIAGNOSIS — G25.81 RESTLESS LEG SYNDROME: ICD-10-CM

## 2022-08-26 DIAGNOSIS — I70.0 AORTIC ATHEROSCLEROSIS: ICD-10-CM

## 2022-08-26 DIAGNOSIS — E06.3 HASHIMOTO'S DISEASE: Primary | ICD-10-CM

## 2022-08-26 DIAGNOSIS — R79.89 ELEVATED FERRITIN LEVEL: ICD-10-CM

## 2022-08-26 DIAGNOSIS — E78.00 HYPERCHOLESTEROLEMIA: ICD-10-CM

## 2022-08-26 DIAGNOSIS — E66.9 OBESITY (BMI 30-39.9): ICD-10-CM

## 2022-08-26 LAB
25(OH)D3+25(OH)D2 SERPL-MCNC: 55 NG/ML (ref 30–96)
ALBUMIN SERPL BCP-MCNC: 4.2 G/DL (ref 3.5–5.2)
ALP SERPL-CCNC: 95 U/L (ref 55–135)
ALT SERPL W/O P-5'-P-CCNC: 41 U/L (ref 10–44)
AMMONIA PLAS-SCNC: 26 UMOL/L (ref 10–50)
ANION GAP SERPL CALC-SCNC: 10 MMOL/L (ref 8–16)
AST SERPL-CCNC: 26 U/L (ref 10–40)
BILIRUB SERPL-MCNC: 1.3 MG/DL (ref 0.1–1)
BUN SERPL-MCNC: 18 MG/DL (ref 6–20)
CA-I BLDV-SCNC: 1.26 MMOL/L (ref 1.06–1.42)
CALCIUM SERPL-MCNC: 9.3 MG/DL (ref 8.7–10.5)
CHLORIDE SERPL-SCNC: 106 MMOL/L (ref 95–110)
CO2 SERPL-SCNC: 25 MMOL/L (ref 23–29)
CREAT SERPL-MCNC: 1.1 MG/DL (ref 0.5–1.4)
EST. GFR  (NO RACE VARIABLE): >60 ML/MIN/1.73 M^2
FERRITIN SERPL-MCNC: 320 NG/ML (ref 20–300)
GGT SERPL-CCNC: 235 U/L (ref 8–55)
GLUCOSE SERPL-MCNC: 92 MG/DL (ref 70–110)
IRON SERPL-MCNC: 167 UG/DL (ref 45–160)
POTASSIUM SERPL-SCNC: 4.4 MMOL/L (ref 3.5–5.1)
PROT SERPL-MCNC: 7.3 G/DL (ref 6–8.4)
PTH-INTACT SERPL-MCNC: 31.2 PG/ML (ref 9–77)
SATURATED IRON: 44 % (ref 20–50)
SODIUM SERPL-SCNC: 141 MMOL/L (ref 136–145)
T3 SERPL-MCNC: 120 NG/DL (ref 60–180)
T4 FREE SERPL-MCNC: 0.94 NG/DL (ref 0.71–1.51)
TOTAL IRON BINDING CAPACITY: 380 UG/DL (ref 250–450)
TRANSFERRIN SERPL-MCNC: 257 MG/DL (ref 200–375)
TRANSFERRIN SERPL-MCNC: 257 MG/DL (ref 200–375)
TSH SERPL DL<=0.005 MIU/L-ACNC: 1.01 UIU/ML (ref 0.4–4)

## 2022-08-26 PROCEDURE — 3078F DIAST BP <80 MM HG: CPT | Mod: CPTII,S$GLB,, | Performed by: INTERNAL MEDICINE

## 2022-08-26 PROCEDURE — 3074F SYST BP LT 130 MM HG: CPT | Mod: CPTII,S$GLB,, | Performed by: INTERNAL MEDICINE

## 2022-08-26 PROCEDURE — 1159F MED LIST DOCD IN RCRD: CPT | Mod: CPTII,S$GLB,, | Performed by: INTERNAL MEDICINE

## 2022-08-26 PROCEDURE — 99999 PR PBB SHADOW E&M-EST. PATIENT-LVL IV: CPT | Mod: PBBFAC,,, | Performed by: INTERNAL MEDICINE

## 2022-08-26 PROCEDURE — 3044F PR MOST RECENT HEMOGLOBIN A1C LEVEL <7.0%: ICD-10-PCS | Mod: CPTII,S$GLB,, | Performed by: INTERNAL MEDICINE

## 2022-08-26 PROCEDURE — 3078F PR MOST RECENT DIASTOLIC BLOOD PRESSURE < 80 MM HG: ICD-10-PCS | Mod: CPTII,S$GLB,, | Performed by: INTERNAL MEDICINE

## 2022-08-26 PROCEDURE — 3008F BODY MASS INDEX DOCD: CPT | Mod: CPTII,S$GLB,, | Performed by: INTERNAL MEDICINE

## 2022-08-26 PROCEDURE — 99214 OFFICE O/P EST MOD 30 MIN: CPT | Mod: S$GLB,,, | Performed by: INTERNAL MEDICINE

## 2022-08-26 PROCEDURE — 82140 ASSAY OF AMMONIA: CPT | Performed by: INTERNAL MEDICINE

## 2022-08-26 PROCEDURE — 84432 ASSAY OF THYROGLOBULIN: CPT | Performed by: INTERNAL MEDICINE

## 2022-08-26 PROCEDURE — 84443 ASSAY THYROID STIM HORMONE: CPT | Performed by: INTERNAL MEDICINE

## 2022-08-26 PROCEDURE — 99214 PR OFFICE/OUTPT VISIT, EST, LEVL IV, 30-39 MIN: ICD-10-PCS | Mod: S$GLB,,, | Performed by: INTERNAL MEDICINE

## 2022-08-26 PROCEDURE — 1160F RVW MEDS BY RX/DR IN RCRD: CPT | Mod: CPTII,S$GLB,, | Performed by: INTERNAL MEDICINE

## 2022-08-26 PROCEDURE — 3044F HG A1C LEVEL LT 7.0%: CPT | Mod: CPTII,S$GLB,, | Performed by: INTERNAL MEDICINE

## 2022-08-26 PROCEDURE — 3008F PR BODY MASS INDEX (BMI) DOCUMENTED: ICD-10-PCS | Mod: CPTII,S$GLB,, | Performed by: INTERNAL MEDICINE

## 2022-08-26 PROCEDURE — 80053 COMPREHEN METABOLIC PANEL: CPT | Performed by: INTERNAL MEDICINE

## 2022-08-26 PROCEDURE — 3074F PR MOST RECENT SYSTOLIC BLOOD PRESSURE < 130 MM HG: ICD-10-PCS | Mod: CPTII,S$GLB,, | Performed by: INTERNAL MEDICINE

## 2022-08-26 PROCEDURE — 1160F PR REVIEW ALL MEDS BY PRESCRIBER/CLIN PHARMACIST DOCUMENTED: ICD-10-PCS | Mod: CPTII,S$GLB,, | Performed by: INTERNAL MEDICINE

## 2022-08-26 PROCEDURE — 82306 VITAMIN D 25 HYDROXY: CPT | Performed by: INTERNAL MEDICINE

## 2022-08-26 PROCEDURE — 84466 ASSAY OF TRANSFERRIN: CPT | Performed by: INTERNAL MEDICINE

## 2022-08-26 PROCEDURE — 99999 PR PBB SHADOW E&M-EST. PATIENT-LVL IV: ICD-10-PCS | Mod: PBBFAC,,, | Performed by: INTERNAL MEDICINE

## 2022-08-26 PROCEDURE — 1159F PR MEDICATION LIST DOCUMENTED IN MEDICAL RECORD: ICD-10-PCS | Mod: CPTII,S$GLB,, | Performed by: INTERNAL MEDICINE

## 2022-08-26 PROCEDURE — 82330 ASSAY OF CALCIUM: CPT | Performed by: INTERNAL MEDICINE

## 2022-08-26 PROCEDURE — 84480 ASSAY TRIIODOTHYRONINE (T3): CPT | Performed by: INTERNAL MEDICINE

## 2022-08-26 PROCEDURE — 36415 COLL VENOUS BLD VENIPUNCTURE: CPT | Performed by: INTERNAL MEDICINE

## 2022-08-26 PROCEDURE — 84439 ASSAY OF FREE THYROXINE: CPT | Performed by: INTERNAL MEDICINE

## 2022-08-26 PROCEDURE — 82728 ASSAY OF FERRITIN: CPT | Performed by: INTERNAL MEDICINE

## 2022-08-26 PROCEDURE — 82977 ASSAY OF GGT: CPT | Performed by: INTERNAL MEDICINE

## 2022-08-26 PROCEDURE — 83970 ASSAY OF PARATHORMONE: CPT | Performed by: INTERNAL MEDICINE

## 2022-08-26 NOTE — PROGRESS NOTES
Subjective:      Patient ID: Narciso Mckeon is a 56 y.o. male.    Chief Complaint:    Thyroid Nodule     Patient is a 56 yr old gentleman with history of post treatment Graves disease seen in [facility] today    History of Present Illness    The patient, Mr Mckeon is a 56 yr old gentleman with history of Graves disease with hyperthyroidism seen in [facility] today. He had been managed with long term methimazole therapy and had previously been seen on this account with Dr Geovanni Watson and Dr Huerta. He is presently of this medication though and his last TFTs confirmed biochemical euthyroid state.  His  thyroid USS from 05/19 showed stable thyroid nodular disease presently not at the threshold for biopsy.  His latest  thyroid USS  from  05/21 showed sonographic stability of the thyroid nodules and thus his next USS should be for ~ 05/23 as he has had serial sonographic surveillance with stability for ~ 5 yrs prior..  Patient has a background epworth score of 9. Patient has polysomogram done ~ 2 yrs ago and   Showed  RLS with loud snoring but no apnea.  He continues to have intermittent neck tightness but no dysphagia nor dyspnea.  No persistent palpitations. He continues to have intermittent excessive tearing in the right eye.  Patient sees Dr Freire on this account.  Patient has been of methimazole now for ~ 2 yrs.  There is no family history of thyroid disease.   Patient was born and raised in [facility]. Patient has no history of residence outside the country.  Patients does does not contain excessive amounts of sea food, cabbage or soy products.  Patient stopped smoking in 2014 but had smoked for 20 yrs before.  Patient drinks ~ 3 beers /week.     The 10-year ASCVD risk score (Joyce JUANITA Jr., et al., 2013) is: 3.7%    Values used to calculate the score:      Age: 52 years      Sex: Male      Is Non- : No      Diabetic: No      Tobacco smoker: No      Systolic Blood Pressure: 117 mmHg      Is  BP treated: No      HDL Cholesterol: 50 mg/dL      Total Cholesterol: 189 mg/dL      Patients  fibroscan from 05/19 showed F0 and stage 2 hepatic steatosis.  Patients latest fibroscan below (from 01/21);    Findings  Median liver stiffness score:  6.3  CAP Reading: dB/m:  256     IQR/med %:  17  Interpretation  Fibrosis interpretation is based on medial liver stiffness - Kilopascal (kPa).     Fibrosis Stage:  F 0-1  Steatosis interpretation is based on controlled attenuation parameter - (dB/m).     Steatosis Grade:  S1  This shows significant interval improvement in his prior fatty liver.    Patient has no fresh complaints today.   Patient has gained ~ 13 lbs since the start of 2021 but has been doing a lot of resistance exercise with increased muscle mass.    Patient had been screened for hemochromotasis in the past (2016) and that was -ve.  Her has some occasional issues with swallowing when he tries to gulp down fluids rapidly but is able to swallow without difficult when he slows down. No odynophagia nor dysphagia.         Review of Systems   Constitutional: Negative for chills, diaphoresis, fatigue and unexpected weight change.   HENT: Negative for facial swelling, hearing loss, trouble swallowing and voice change.    Eyes: Negative for photophobia and visual disturbance.   Respiratory: Negative for cough and shortness of breath.    Cardiovascular: Negative for chest pain, palpitations and leg swelling.   Gastrointestinal: Negative for nausea and vomiting.   Endocrine: Negative for polyphagia and polyuria.   Genitourinary: Negative for difficulty urinating and dysuria.   Musculoskeletal: Positive for arthralgias (mainly in feet) and back pain (chronic and stable).   Skin: Negative for color change, pallor and rash.   Neurological: Negative for headaches.   Hematological: Does not bruise/bleed easily.   Psychiatric/Behavioral: Negative for confusion and sleep disturbance. The patient is not nervous/anxious.   "      Objective:     /76 (BP Location: Left arm, Patient Position: Sitting, BP Method: Large (Manual))   Pulse 101   Temp 97.9 °F (36.6 °C) (Oral)   Ht 5' 10" (1.778 m)   Wt 107.6 kg (237 lb 1.7 oz)   SpO2 96%   BMI 34.02 kg/m²   Body surface area is 2.31 meters squared.         Physical Exam  Vitals reviewed.   Constitutional:       General: He is not in acute distress.     Appearance: He is well-developed. He is not toxic-appearing or diaphoretic.      Comments: Pleasant middle aged gentleman. Not pale, anicteric and afebrile. Well hydrated and not in any acute distress.Patient is in much better mood today that at last visit and does appear noticeably more muscular.   HENT:      Head: Normocephalic and atraumatic. No right periorbital erythema or left periorbital erythema. Hair is normal.      Mouth/Throat:      Pharynx: No oropharyngeal exudate.   Eyes:      General: Lids are normal. Lids are everted, no foreign bodies appreciated. No scleral icterus.     Conjunctiva/sclera: Conjunctivae normal.      Right eye: Right conjunctiva is not injected.      Left eye: Left conjunctiva is not injected.      Pupils: Pupils are equal, round, and reactive to light.      Comments: No proptosis and no diplopia   Neck:      Thyroid: No thyroid mass or thyromegaly.      Vascular: No carotid bruit or JVD.      Trachea: Trachea and phonation normal. No tracheal tenderness or tracheal deviation.   Cardiovascular:      Rate and Rhythm: Normal rate and regular rhythm.      Pulses: Normal pulses.      Heart sounds: Normal heart sounds. No murmur heard.    No gallop.   Pulmonary:      Effort: Pulmonary effort is normal. No respiratory distress.      Breath sounds: Normal breath sounds. No stridor. No decreased breath sounds, wheezing, rhonchi or rales.   Abdominal:      General: Bowel sounds are normal. There is no distension.      Palpations: Abdomen is soft. There is no mass.      Tenderness: There is no abdominal " tenderness.   Musculoskeletal:         General: No swelling or tenderness. Normal range of motion.      Cervical back: Full passive range of motion without pain, normal range of motion and neck supple.      Comments: No pedal edema. No digital clubbing nor thyroid acropachy. No pretibial myxedema but has fine tremor of outstretched hands   Lymphadenopathy:      Cervical: No cervical adenopathy.   Skin:     General: Skin is warm and dry.      Coloration: Skin is not jaundiced or pale.      Findings: No bruising, ecchymosis, erythema, petechiae or rash.      Nails: There is no clubbing.      Comments: Diffuse xerosis   Neurological:      General: No focal deficit present.      Mental Status: He is alert and oriented to person, place, and time.      Cranial Nerves: No cranial nerve deficit.      Sensory: No sensory deficit.      Motor: No tremor or abnormal muscle tone.      Gait: Gait normal.      Deep Tendon Reflexes: Reflexes are normal and symmetric. Reflexes normal.   Psychiatric:         Mood and Affect: Mood is not anxious or depressed.         Speech: Speech normal.         Behavior: Behavior normal.         Thought Content: Thought content normal.         Judgment: Judgment normal.         Lab Review:      Latest Reference Range & Units 01/25/22 15:10 03/03/22 08:37   WBC 3.90 - 12.70 K/uL 6.28    RBC 4.60 - 6.20 M/uL 4.58 (L)    Hemoglobin 14.0 - 18.0 g/dL 15.7    Hematocrit 40.0 - 54.0 % 46.1    MCV 82 - 98 fL 101 (H)    MCH 27.0 - 31.0 pg 34.3 (H)    MCHC 32.0 - 36.0 g/dL 34.1    RDW 11.5 - 14.5 % 12.8    Platelets 150 - 450 K/uL 228    MPV 9.2 - 12.9 fL 9.7    Gran % 38.0 - 73.0 % 55.1    Lymph % 18.0 - 48.0 % 34.2    Mono % 4.0 - 15.0 % 8.4    Eosinophil % 0.0 - 8.0 % 1.6    Basophil % 0.0 - 1.9 % 0.5    Immature Granulocytes 0.0 - 0.5 % 0.2    Gran # (ANC) 1.8 - 7.7 K/uL 3.5    Lymph # 1.0 - 4.8 K/uL 2.2    Mono # 0.3 - 1.0 K/uL 0.5    Eos # 0.0 - 0.5 K/uL 0.1    Baso # 0.00 - 0.20 K/uL 0.03    Immature  Grans (Abs) 0.00 - 0.04 K/uL 0.01    nRBC 0 /100 WBC 0    Differential Method  Automated    Ferritin 20.0 - 300.0 ng/mL 450 (H)    Sodium 136 - 145 mmol/L 135 (L)    Potassium 3.5 - 5.1 mmol/L 4.2    Chloride 95 - 110 mmol/L 105    CO2 23 - 29 mmol/L 22 (L)    Anion Gap 8 - 16 mmol/L 8    BUN 6 - 20 mg/dL 13    Creatinine 0.5 - 1.4 mg/dL 0.9    eGFR if non African American >60 mL/min/1.73 m^2 >60.0    eGFR if African American >60 mL/min/1.73 m^2 >60.0    Glucose 70 - 110 mg/dL 91    Calcium 8.7 - 10.5 mg/dL 9.9    Alkaline Phosphatase 55 - 135 U/L 119    PROTEIN TOTAL 6.0 - 8.4 g/dL 7.9    Albumin 3.5 - 5.2 g/dL 4.4    Uric Acid 3.4 - 7.0 mg/dL 6.9    BILIRUBIN TOTAL 0.1 - 1.0 mg/dL 1.0    AST 10 - 40 U/L 31    ALT 10 - 44 U/L 55 (H)    GGT 8 - 55 U/L 402 (H)    Ammonia 10 - 50 umol/L 67 (H)    Cholesterol 120 - 199 mg/dL 182    HDL 40 - 75 mg/dL 51    HDL/Cholesterol Ratio 20.0 - 50.0 % 28.0    LDL Cholesterol External 63.0 - 159.0 mg/dL 88.8    Non-HDL Cholesterol mg/dL 131    Total Cholesterol/HDL Ratio 2.0 - 5.0  3.6    Triglycerides 30 - 150 mg/dL 211 (H)    Lactate, Saul 0.5 - 2.2 mmol/L 0.8    Hemoglobin A1C External 4.0 - 5.6 % 4.6    Estimated Avg Glucose 68 - 131 mg/dL 85    TSH 0.400 - 4.000 uIU/mL 1.436    AFP 0.0 - 8.4 ng/mL 5.8    PSA Total 0.00 - 4.00 ng/mL 2.0    PSA, Free 0.00 - 1.50 ng/mL 0.35    PSA, Free % Not established % 17.50     Acceptable   Yes   (L): Data is abnormally low  (H): Data is abnormally high      Assessment:     1. Hashimoto's disease     2. History of Graves' disease     3. Goiter  T4, Free    T3    Thyroglobulin    TSH   4. Thyroiditis  Thyroglobulin   5. Nodular thyroid disease     6. Dysmetabolic syndrome     7. Hypovitaminosis D  Vitamin D    PTH, Intact    Calcium, Ionized   8. Obesity (BMI 30-39.9)     9. Abnormal thyroid blood test     10. NAFLD (nonalcoholic fatty liver disease)  Comprehensive Metabolic Panel    Ammonia    Gamma GT   11. Hx of colonic  polyps     12. Restless leg syndrome     13. Mixed hyperlipidemia     14. Hypercholesterolemia     15. Aortic atherosclerosis     16. Elevated ferritin level  Ferritin    Iron and TIBC    Transferrin        Regarding Graves disease; appears to be clinical quiescent at the moment as far as thyroid activity. Will recheck full TFTs and thyroid antibody profile.  Regarding thyroid nodular disease; to obtain ffup thyroid USS ~ 05/2023.  Regarding possible opthalmopathy; to continue formal opthalmic referral to evaluate for this.  Depending on the current status as regards antibody titers and presence or absence of orbitopathy may decide on permanent treatment for Graves.  His hx of NAFLD essentially exclude methimazole or PTU use as an option and his recent history of long term smoking makes radio iodine ablation a less than optimal option. Elective thyroidectomy would be the preferred management option for him and I have informed him as much but will await results of latest labs as detailed above.  Regarding NAFLD; ongoing ffup as per hepatology group and rec as regarding ETOH intake as per hepatology team. To start vit E supplements and will check current HBA1c to guide decision re possible addition of metformin.  Regarding hyperlipidemia; to continue antilipidemic therapy as before.        Plan:     FFup in ~ 6mths       no

## 2022-08-29 LAB
THRYOGLOBULIN INTERPRETATION: ABNORMAL
THYROGLOB AB SERPL-ACNC: 22 IU/ML
THYROGLOB SERPL-MCNC: 0.6 NG/ML

## 2022-08-31 ENCOUNTER — PATIENT MESSAGE (OUTPATIENT)
Dept: ENDOCRINOLOGY | Facility: CLINIC | Age: 57
End: 2022-08-31
Payer: COMMERCIAL

## 2022-10-05 ENCOUNTER — OFFICE VISIT (OUTPATIENT)
Dept: URGENT CARE | Facility: CLINIC | Age: 57
End: 2022-10-05
Payer: COMMERCIAL

## 2022-10-05 VITALS
OXYGEN SATURATION: 96 % | RESPIRATION RATE: 16 BRPM | HEART RATE: 80 BPM | SYSTOLIC BLOOD PRESSURE: 124 MMHG | TEMPERATURE: 98 F | BODY MASS INDEX: 34.01 KG/M2 | DIASTOLIC BLOOD PRESSURE: 77 MMHG | WEIGHT: 237 LBS

## 2022-10-05 DIAGNOSIS — J02.9 SORE THROAT: Primary | ICD-10-CM

## 2022-10-05 DIAGNOSIS — J20.9 ACUTE BRONCHITIS, UNSPECIFIED ORGANISM: ICD-10-CM

## 2022-10-05 DIAGNOSIS — R52 GENERALIZED BODY ACHES: ICD-10-CM

## 2022-10-05 LAB
CTP QC/QA: YES
CTP QC/QA: YES
FLUAV AG NPH QL: NEGATIVE
FLUBV AG NPH QL: NEGATIVE
S PYO RRNA THROAT QL PROBE: NEGATIVE

## 2022-10-05 PROCEDURE — 3074F SYST BP LT 130 MM HG: CPT | Mod: S$GLB,,, | Performed by: STUDENT IN AN ORGANIZED HEALTH CARE EDUCATION/TRAINING PROGRAM

## 2022-10-05 PROCEDURE — 3044F PR MOST RECENT HEMOGLOBIN A1C LEVEL <7.0%: ICD-10-PCS | Mod: S$GLB,,, | Performed by: STUDENT IN AN ORGANIZED HEALTH CARE EDUCATION/TRAINING PROGRAM

## 2022-10-05 PROCEDURE — 1160F PR REVIEW ALL MEDS BY PRESCRIBER/CLIN PHARMACIST DOCUMENTED: ICD-10-PCS | Mod: S$GLB,,, | Performed by: STUDENT IN AN ORGANIZED HEALTH CARE EDUCATION/TRAINING PROGRAM

## 2022-10-05 PROCEDURE — 1159F PR MEDICATION LIST DOCUMENTED IN MEDICAL RECORD: ICD-10-PCS | Mod: S$GLB,,, | Performed by: STUDENT IN AN ORGANIZED HEALTH CARE EDUCATION/TRAINING PROGRAM

## 2022-10-05 PROCEDURE — 3074F PR MOST RECENT SYSTOLIC BLOOD PRESSURE < 130 MM HG: ICD-10-PCS | Mod: S$GLB,,, | Performed by: STUDENT IN AN ORGANIZED HEALTH CARE EDUCATION/TRAINING PROGRAM

## 2022-10-05 PROCEDURE — 1160F RVW MEDS BY RX/DR IN RCRD: CPT | Mod: S$GLB,,, | Performed by: STUDENT IN AN ORGANIZED HEALTH CARE EDUCATION/TRAINING PROGRAM

## 2022-10-05 PROCEDURE — 87804 INFLUENZA ASSAY W/OPTIC: CPT | Mod: QW,,, | Performed by: STUDENT IN AN ORGANIZED HEALTH CARE EDUCATION/TRAINING PROGRAM

## 2022-10-05 PROCEDURE — 96372 THER/PROPH/DIAG INJ SC/IM: CPT | Mod: S$GLB,,, | Performed by: STUDENT IN AN ORGANIZED HEALTH CARE EDUCATION/TRAINING PROGRAM

## 2022-10-05 PROCEDURE — 3008F BODY MASS INDEX DOCD: CPT | Mod: S$GLB,,, | Performed by: STUDENT IN AN ORGANIZED HEALTH CARE EDUCATION/TRAINING PROGRAM

## 2022-10-05 PROCEDURE — 3008F PR BODY MASS INDEX (BMI) DOCUMENTED: ICD-10-PCS | Mod: S$GLB,,, | Performed by: STUDENT IN AN ORGANIZED HEALTH CARE EDUCATION/TRAINING PROGRAM

## 2022-10-05 PROCEDURE — 87880 POCT RAPID STREP A: ICD-10-PCS | Mod: QW,,, | Performed by: STUDENT IN AN ORGANIZED HEALTH CARE EDUCATION/TRAINING PROGRAM

## 2022-10-05 PROCEDURE — 3078F DIAST BP <80 MM HG: CPT | Mod: S$GLB,,, | Performed by: STUDENT IN AN ORGANIZED HEALTH CARE EDUCATION/TRAINING PROGRAM

## 2022-10-05 PROCEDURE — 96372 PR INJECTION,THERAP/PROPH/DIAG2ST, IM OR SUBCUT: ICD-10-PCS | Mod: S$GLB,,, | Performed by: STUDENT IN AN ORGANIZED HEALTH CARE EDUCATION/TRAINING PROGRAM

## 2022-10-05 PROCEDURE — 87880 STREP A ASSAY W/OPTIC: CPT | Mod: QW,,, | Performed by: STUDENT IN AN ORGANIZED HEALTH CARE EDUCATION/TRAINING PROGRAM

## 2022-10-05 PROCEDURE — 99214 OFFICE O/P EST MOD 30 MIN: CPT | Mod: 25,S$GLB,, | Performed by: STUDENT IN AN ORGANIZED HEALTH CARE EDUCATION/TRAINING PROGRAM

## 2022-10-05 PROCEDURE — 1159F MED LIST DOCD IN RCRD: CPT | Mod: S$GLB,,, | Performed by: STUDENT IN AN ORGANIZED HEALTH CARE EDUCATION/TRAINING PROGRAM

## 2022-10-05 PROCEDURE — 3044F HG A1C LEVEL LT 7.0%: CPT | Mod: S$GLB,,, | Performed by: STUDENT IN AN ORGANIZED HEALTH CARE EDUCATION/TRAINING PROGRAM

## 2022-10-05 PROCEDURE — 3078F PR MOST RECENT DIASTOLIC BLOOD PRESSURE < 80 MM HG: ICD-10-PCS | Mod: S$GLB,,, | Performed by: STUDENT IN AN ORGANIZED HEALTH CARE EDUCATION/TRAINING PROGRAM

## 2022-10-05 PROCEDURE — 87804 POCT INFLUENZA A/B: ICD-10-PCS | Mod: 59,QW,, | Performed by: STUDENT IN AN ORGANIZED HEALTH CARE EDUCATION/TRAINING PROGRAM

## 2022-10-05 PROCEDURE — 99214 PR OFFICE/OUTPT VISIT, EST, LEVL IV, 30-39 MIN: ICD-10-PCS | Mod: 25,S$GLB,, | Performed by: STUDENT IN AN ORGANIZED HEALTH CARE EDUCATION/TRAINING PROGRAM

## 2022-10-05 RX ORDER — ALBUTEROL SULFATE 90 UG/1
1-2 AEROSOL, METERED RESPIRATORY (INHALATION) EVERY 6 HOURS PRN
Qty: 18 G | Refills: 0 | Status: SHIPPED | OUTPATIENT
Start: 2022-10-05

## 2022-10-05 RX ORDER — PREDNISONE 20 MG/1
40 TABLET ORAL DAILY
Qty: 8 TABLET | Refills: 0 | Status: SHIPPED | OUTPATIENT
Start: 2022-10-06 | End: 2022-10-10

## 2022-10-05 RX ORDER — CEFTRIAXONE 1 G/1
1 INJECTION, POWDER, FOR SOLUTION INTRAMUSCULAR; INTRAVENOUS
Status: COMPLETED | OUTPATIENT
Start: 2022-10-05 | End: 2022-10-05

## 2022-10-05 RX ORDER — DEXAMETHASONE SODIUM PHOSPHATE 4 MG/ML
8 INJECTION, SOLUTION INTRA-ARTICULAR; INTRALESIONAL; INTRAMUSCULAR; INTRAVENOUS; SOFT TISSUE
Status: COMPLETED | OUTPATIENT
Start: 2022-10-05 | End: 2022-10-05

## 2022-10-05 RX ORDER — PROMETHAZINE HYDROCHLORIDE AND DEXTROMETHORPHAN HYDROBROMIDE 6.25; 15 MG/5ML; MG/5ML
5 SYRUP ORAL
Qty: 118 ML | Refills: 0 | Status: SHIPPED | OUTPATIENT
Start: 2022-10-05 | End: 2022-10-15

## 2022-10-05 RX ADMIN — CEFTRIAXONE 1 G: 1 INJECTION, POWDER, FOR SOLUTION INTRAMUSCULAR; INTRAVENOUS at 05:10

## 2022-10-05 RX ADMIN — DEXAMETHASONE SODIUM PHOSPHATE 8 MG: 4 INJECTION, SOLUTION INTRA-ARTICULAR; INTRALESIONAL; INTRAMUSCULAR; INTRAVENOUS; SOFT TISSUE at 05:10

## 2022-10-05 NOTE — PROGRESS NOTES
Subjective:       Patient ID: Narciso Mckeon is a 56 y.o. male.    Vitals:  weight is 107.5 kg (237 lb). His oral temperature is 98.1 °F (36.7 °C). His blood pressure is 124/77 and his pulse is 80. His respiration is 16 and oxygen saturation is 96%.     Chief Complaint: Sinus Problem    Patient is a 56-year-old male with past medical history of hyperlipidemia, hypothyroidism, NAFLD, and arthritis who presents to clinic for sinus related issues.  Patient reports he is vaccinated.  Patient states that greater than 1 week ago he went on a cruise and was previously exposed to a sick sister.  Patient states that he started taking a Z-Ottoniel yesterday for symptoms.  Patient states he has also been taking over-the-counter medications with little relief of symptoms.  Patient states he has experienced fatigue, chills, nasal sinus congestion with postnasal drainage, sinus pressure, sore throat with some painful swallowing, productive cough, diarrhea, generalized body aches, back pain, and generalized headaches.  Patient denies any acute dizziness, ear pain, drooling, voice change, chest pain or palpitations, shortness of breath, abdominal pain, nausea or vomiting, urinary symptoms, rash, loss of smell or taste, or change in mentation.  Patient states that his wife is now sick with similar symptoms.    Sinus Problem  This is a new problem. The current episode started 1 to 4 weeks ago. The problem is unchanged. There has been no fever. Associated symptoms include chills, congestion, coughing, headaches, sinus pressure, sneezing and a sore throat. Pertinent negatives include no ear pain or shortness of breath.     Constitution: Positive for chills and fatigue.   HENT:  Positive for congestion, postnasal drip, sinus pressure, sore throat and trouble swallowing (Painful swallowing). Negative for ear pain, drooling and voice change.    Neck: neck negative.   Cardiovascular: Negative.  Negative for chest pain and palpitations.    Eyes: Negative.    Respiratory:  Positive for cough and sputum production. Negative for chest tightness, shortness of breath and wheezing.    Gastrointestinal:  Positive for diarrhea. Negative for abdominal pain, nausea and vomiting.   Endocrine: negative.   Genitourinary: Negative.    Musculoskeletal:  Positive for back pain and muscle ache.   Skin: Negative.  Negative for color change, pale, rash and erythema.   Allergic/Immunologic: Positive for sneezing.   Neurological:  Positive for headaches. Negative for dizziness, light-headedness, passing out, disorientation and altered mental status.   Hematologic/Lymphatic: Negative.    Psychiatric/Behavioral: Negative.  Negative for altered mental status, disorientation and confusion.      Objective:      Physical Exam   Constitutional: He is oriented to person, place, and time. He appears well-developed. He is cooperative.  Non-toxic appearance. He appears ill. No distress.   HENT:   Head: Normocephalic and atraumatic.   Ears:   Right Ear: Hearing, tympanic membrane, external ear and ear canal normal.   Left Ear: Hearing, tympanic membrane, external ear and ear canal normal.   Nose: Congestion present. No mucosal edema, rhinorrhea or nasal deformity. No epistaxis. Right sinus exhibits no maxillary sinus tenderness and no frontal sinus tenderness. Left sinus exhibits no maxillary sinus tenderness and no frontal sinus tenderness.   Mouth/Throat: Uvula is midline and mucous membranes are normal. Mucous membranes are moist. No trismus in the jaw. Normal dentition. No uvula swelling. Posterior oropharyngeal erythema present. No oropharyngeal exudate. Oropharynx is clear.   Eyes: Conjunctivae and lids are normal. Pupils are equal, round, and reactive to light. Right eye exhibits no discharge. Left eye exhibits no discharge. No scleral icterus.   Neck: Trachea normal and phonation normal. Neck supple. No neck rigidity present.   Cardiovascular: Normal rate, regular rhythm,  normal heart sounds and normal pulses.   Pulmonary/Chest: Effort normal and breath sounds normal. No respiratory distress (Unlabored.  Equal rise and fall of chest.  Able speak in full complete sentences.  No adventitious breath sounds noted.). He has no wheezes. He has no rhonchi. He has no rales.   Abdominal: Normal appearance and bowel sounds are normal. He exhibits no distension and no mass. Soft. There is no abdominal tenderness.   Musculoskeletal: Normal range of motion.         General: No deformity. Normal range of motion.      Cervical back: He exhibits no tenderness.   Lymphadenopathy:     He has no cervical adenopathy.   Neurological: He is alert and oriented to person, place, and time. He exhibits normal muscle tone. Coordination normal.   Skin: Skin is warm, dry, intact, not diaphoretic, not pale and no rash. No erythema   Psychiatric: His speech is normal and behavior is normal. Judgment and thought content normal.   Nursing note and vitals reviewed.      Assessment:       1. Sore throat    2. Generalized body aches    3. Acute bronchitis, unspecified organism          Plan:         Sore throat  -     POCT Influenza A/B  -     POCT rapid strep A    Generalized body aches  -     POCT Influenza A/B  -     POCT rapid strep A    Acute bronchitis, unspecified organism    Other orders  -     cefTRIAXone injection 1 g  -     dexamethasone injection 8 mg  -     albuterol (VENTOLIN HFA) 90 mcg/actuation inhaler; Inhale 1-2 puffs into the lungs every 6 (six) hours as needed for Wheezing or Shortness of Breath. Rescue  Dispense: 18 g; Refill: 0  -     promethazine-dextromethorphan (PROMETHAZINE-DM) 6.25-15 mg/5 mL Syrp; Take 5 mLs by mouth every 4 to 6 hours as needed (Cough).  Dispense: 118 mL; Refill: 0  -     predniSONE (DELTASONE) 20 MG tablet; Take 2 tablets (40 mg total) by mouth once daily. for 4 days  Dispense: 8 tablet; Refill: 0               Labs:  Influenza A and B negative.  Rapid strep negative.     Rocephin 1 g IM in clinic.  Patient tolerated well.  No complications noted.    Decadron 8 mg IM in clinic.  Patient tolerated well.  No complications noted.    Continue previously prescribed Z-Ottoniel as directed.    Take medications as prescribed.    Tylenol/Motrin per package instructions for any pain or fever.    Assure adequate hydration.    Follow-up with PCP in 1-2 days.    Return to clinic as needed.    To ED for any new acutely worsening symptoms.    Patient in agreement with plan of care.    DISCLAIMER: Please note that my documentation in this Electronic Healthcare Record was produced using speech recognition software and therefore may contain errors related to that software system.These could include grammar, punctuation and spelling errors or the inclusion/exclusion of phrases that were not intended. Garbled syntax, mangled pronouns, and other bizarre constructions may be attributed to that software system.

## 2022-11-03 ENCOUNTER — OFFICE VISIT (OUTPATIENT)
Dept: FAMILY MEDICINE | Facility: CLINIC | Age: 57
End: 2022-11-03
Payer: COMMERCIAL

## 2022-11-03 VITALS
SYSTOLIC BLOOD PRESSURE: 138 MMHG | OXYGEN SATURATION: 98 % | RESPIRATION RATE: 18 BRPM | HEIGHT: 70 IN | BODY MASS INDEX: 34.22 KG/M2 | WEIGHT: 239 LBS | DIASTOLIC BLOOD PRESSURE: 88 MMHG | HEART RATE: 74 BPM

## 2022-11-03 DIAGNOSIS — F41.9 ANXIETY: ICD-10-CM

## 2022-11-03 DIAGNOSIS — G25.81 RESTLESS LEG SYNDROME: ICD-10-CM

## 2022-11-03 DIAGNOSIS — G47.30 SLEEP APNEA, UNSPECIFIED TYPE: ICD-10-CM

## 2022-11-03 DIAGNOSIS — K76.0 NAFLD (NONALCOHOLIC FATTY LIVER DISEASE): ICD-10-CM

## 2022-11-03 DIAGNOSIS — E04.1 NODULAR THYROID DISEASE: ICD-10-CM

## 2022-11-03 DIAGNOSIS — Z76.89 ENCOUNTER TO ESTABLISH CARE: Primary | ICD-10-CM

## 2022-11-03 DIAGNOSIS — R40.0 DAYTIME SOMNOLENCE: ICD-10-CM

## 2022-11-03 DIAGNOSIS — Z23 FLU VACCINE NEED: ICD-10-CM

## 2022-11-03 DIAGNOSIS — R53.83 LOW ENERGY: ICD-10-CM

## 2022-11-03 PROBLEM — E06.9 THYROIDITIS: Status: RESOLVED | Noted: 2018-05-25 | Resolved: 2022-11-03

## 2022-11-03 PROBLEM — M79.672 PAIN IN BOTH FEET: Status: RESOLVED | Noted: 2022-02-16 | Resolved: 2022-11-03

## 2022-11-03 PROBLEM — E66.09 CLASS 1 OBESITY DUE TO EXCESS CALORIES WITH SERIOUS COMORBIDITY AND BODY MASS INDEX (BMI) OF 34.0 TO 34.9 IN ADULT: Status: ACTIVE | Noted: 2020-07-15

## 2022-11-03 PROBLEM — M79.671 PAIN IN BOTH FEET: Status: RESOLVED | Noted: 2022-02-16 | Resolved: 2022-11-03

## 2022-11-03 PROBLEM — R79.89 ABNORMAL LIVER FUNCTION TESTS: Status: RESOLVED | Noted: 2018-03-18 | Resolved: 2022-11-03

## 2022-11-03 PROCEDURE — 99204 OFFICE O/P NEW MOD 45 MIN: CPT | Mod: S$GLB,,, | Performed by: FAMILY MEDICINE

## 2022-11-03 PROCEDURE — 99204 PR OFFICE/OUTPT VISIT, NEW, LEVL IV, 45-59 MIN: ICD-10-PCS | Mod: S$GLB,,, | Performed by: FAMILY MEDICINE

## 2022-11-03 PROCEDURE — 99205 OFFICE O/P NEW HI 60 MIN: CPT | Performed by: FAMILY MEDICINE

## 2022-11-03 PROCEDURE — G0008 ADMIN INFLUENZA VIRUS VAC: HCPCS | Mod: PBBFAC | Performed by: FAMILY MEDICINE

## 2022-11-03 RX ORDER — SERTRALINE HYDROCHLORIDE 50 MG/1
TABLET, FILM COATED ORAL
Qty: 90 TABLET | Refills: 1 | Status: SHIPPED | OUTPATIENT
Start: 2022-11-03 | End: 2023-04-21

## 2022-11-03 NOTE — PROGRESS NOTES
"  SUBJECTIVE:    Patient ID: Narciso Mckeon is a 57 y.o. male.    Chief Complaint: Establish Care, Anxiety, Hyperlipidemia, Hypertension, and Dizziness    HPI  Narciso Mckeon is a 57 y.o. who comes in to establish care with this PCP. Formerly seen by Dr Gambino.     Acute issues:   -Wants testosterone levels checked - feels has no energy.   -Wife concerned that he's snoring, gasping for air. Has had sleep study done several years ago, at which time there was no KHOA, but now with more severe sxs.  -Having anxiety, sometimes with "attacks" in the middle of the night - has to get up from bed and walk outside for fresh air.   - Has known tear on R rotator cuff from 15-20y ago. Does not want to do PT or Ortho for now.    Care Team:  -Endocrinology: Dr Michelle  -Dermatology: Dr Obrien  -Orthopedics: Dr Hernandez  -Liver transplant: Dr Pickens ([facility])  -Pain mgmt - Dr Maldonado  -GI - Dr Arellano  -Podiatry - does not remember name  -Cardiology - new, has upcoming appt    MedHx:  -Nodular thyroid disease/Hx Graves w Hyperthyroidism - now w Hashimotos. Previously on Methimazole. Currently monitored, no meds.   -Aortic atherosclerosis - awaiting Cardiology appt  -Hyperlipidemia - on Rosuvastatin; takes it q2 days  -RLS - on Gabapentin 300mg TID  -Glaucoma - not on gtt  -NAFLD, Fibrosis Stage 2. On Vitamin E. Followed by Dr Pickens.   -Colonic polyps, multiple. Followed by Dr Arellano  -Chronic LBP and cervical neck pain. Has done injections. Currently on meds through Pain Mgmt - MS Contin, Oxycodone, Meloxicam, Gabapentin.   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  EtOH - a couple of beers a couple of times per week  Tobacco - quite 2014 (smoked 20y x 1-2ppd)  Recreational drugs - none    FamHx:  DM - none known  HTN - mom  HLD - mom  Early CVD - Dad MI at 54yo  CA - none " known    Health Maintenance:  *CRC screen - hx multiple polyps. Last screen was 2y ago.  *Prostate CA screen - PSA checked 1/25/22, wnl  *Lung CA screen - 8/19/22, Birads 3. Needs repeat in 6 mos  *Immz due - Flu, Pneumococcal, Shingles, Covid booster    Has recent labs from 8/26/22 (Dr Michelle). Reviewed.      Review of patient's allergies indicates:   Allergen Reactions    Bactrim [sulfamethoxazole-trimethoprim]      Possibly allergic reaction         Current Outpatient Medications:     albuterol (VENTOLIN HFA) 90 mcg/actuation inhaler, Inhale 1-2 puffs into the lungs every 6 (six) hours as needed for Wheezing or Shortness of Breath. Rescue, Disp: 18 g, Rfl: 0    aspirin (ECOTRIN) 81 MG EC tablet, TAKE ONE TABLET BY MOUTH ONCE DAILY, Disp: 90 tablet, Rfl: 3    b complex vitamins tablet, Take 1 tablet by mouth once daily., Disp: , Rfl:     betamethasone dipropionate (DIPROLENE) 0.05 % ointment, AAA hand BID PRN flare, Disp: 45 g, Rfl: 1    clobetasol (TEMOVATE) 0.05 % cream, Thin film to AA hands BID PRN flare, Disp: 45 g, Rfl: 1    gabapentin (NEURONTIN) 300 MG capsule, Take 1 capsule (300 mg total) by mouth 3 (three) times daily., Disp: 90 capsule, Rfl: 0    meclizine (ANTIVERT) 25 mg tablet, TAKE ONE TABLET BY MOUTH THREE TIMES DAILY AS NEEDED, Disp: 30 tablet, Rfl: 1    meloxicam (MOBIC) 15 MG tablet, Take 15 mg by mouth once daily., Disp: , Rfl:     morphine (MS CONTIN) 15 MG 12 hr tablet, Take 15 mg by mouth as needed., Disp: , Rfl:     multivitamin capsule, Take 1 capsule by mouth once daily., Disp: , Rfl:     oxyCODONE (ROXICODONE) 15 MG Tab, Take 15 mg by mouth daily as needed., Disp: , Rfl:     rosuvastatin (CRESTOR) 20 MG tablet, TAKE ONE TABLET BY MOUTH EVERY EVENING, Disp: 90 tablet, Rfl: 0    tizanidine (ZANAFLEX) 4 MG tablet, Take 2 mg by mouth every 8 (eight) hours. , Disp: , Rfl:     vitamin E 200 UNIT capsule, Take 200 Units by mouth once daily., Disp: , Rfl:     sertraline (ZOLOFT) 50 MG tablet,  "Take half tab PO daily x 7 days, then increase to 1 tab PO daily., Disp: 90 tablet, Rfl: 1    Review of Systems       Objective:      Vitals:    11/03/22 0905   BP: 138/88   Pulse: 74   Resp: 18   SpO2: 98%   Weight: 108.4 kg (239 lb)   Height: 5' 10" (1.778 m)     Physical Exam        Assessment:       1. Encounter to establish care    2. Nodular thyroid disease    3. NAFLD (nonalcoholic fatty liver disease)    4. Restless leg syndrome    5. Flu vaccine need    6. Anxiety    7. Low energy    8. Sleep apnea, unspecified type    9. Daytime somnolence         Plan:       Encounter to establish care    Nodular thyroid disease    NAFLD (nonalcoholic fatty liver disease)    Restless leg syndrome    Flu vaccine need  -     Influenza - Quadrivalent *Preferred* (6 months+) (PF)    Anxiety  -     sertraline (ZOLOFT) 50 MG tablet; Take half tab PO daily x 7 days, then increase to 1 tab PO daily.  Dispense: 90 tablet; Refill: 1    Low energy  -     Testosterone; Future; Expected date: 11/03/2022  -     CBC Without Differential; Future; Expected date: 11/03/2022    Sleep apnea, unspecified type  -     Polysomnogram (CPAP will be added if patient meets diagnostic criteria.); Future    Daytime somnolence  -     Polysomnogram (CPAP will be added if patient meets diagnostic criteria.); Future    Plan:  - Obtained hx and reviewed age-appropriate, history-driven recommendations for health maintenance including immunizations and cancer screenings.  - Will refer for sleep study  - Trial Sertraline for anxiety  - Check testosterone per pt request.  - Continue f/u with Hepatology, Endocrinology, Pain mgmt, etc.  - Strongly encouraged to establish with Cardiology given Fhx early CVD and personal hx as above.    No follow-ups on file.        11/3/2022 Yumiko Chavarria M.D.        "

## 2022-11-04 ENCOUNTER — LAB VISIT (OUTPATIENT)
Dept: LAB | Facility: HOSPITAL | Age: 57
End: 2022-11-04
Attending: FAMILY MEDICINE
Payer: COMMERCIAL

## 2022-11-04 ENCOUNTER — PATIENT MESSAGE (OUTPATIENT)
Dept: FAMILY MEDICINE | Facility: CLINIC | Age: 57
End: 2022-11-04

## 2022-11-04 DIAGNOSIS — R53.83 LOW ENERGY: ICD-10-CM

## 2022-11-04 LAB
ERYTHROCYTE [DISTWIDTH] IN BLOOD BY AUTOMATED COUNT: 13.2 % (ref 11.5–14.5)
HCT VFR BLD AUTO: 40.3 % (ref 40–54)
HGB BLD-MCNC: 14.1 G/DL (ref 14–18)
MCH RBC QN AUTO: 35.3 PG (ref 27–31)
MCHC RBC AUTO-ENTMCNC: 35 G/DL (ref 32–36)
MCV RBC AUTO: 101 FL (ref 82–98)
PLATELET # BLD AUTO: 164 K/UL (ref 150–450)
PMV BLD AUTO: 9.3 FL (ref 9.2–12.9)
RBC # BLD AUTO: 4 M/UL (ref 4.6–6.2)
TESTOST SERPL-MCNC: 363 NG/DL (ref 304–1227)
WBC # BLD AUTO: 5.4 K/UL (ref 3.9–12.7)

## 2022-11-04 PROCEDURE — 84403 ASSAY OF TOTAL TESTOSTERONE: CPT | Performed by: FAMILY MEDICINE

## 2022-11-04 PROCEDURE — 85027 COMPLETE CBC AUTOMATED: CPT | Performed by: FAMILY MEDICINE

## 2022-11-04 PROCEDURE — 36415 COLL VENOUS BLD VENIPUNCTURE: CPT | Performed by: FAMILY MEDICINE

## 2022-11-18 ENCOUNTER — TELEPHONE (OUTPATIENT)
Dept: FAMILY MEDICINE | Facility: CLINIC | Age: 57
End: 2022-11-18

## 2022-11-18 DIAGNOSIS — G47.10 HYPERSOMNIA: ICD-10-CM

## 2022-11-18 DIAGNOSIS — R40.0 DAYTIME SOMNOLENCE: Primary | ICD-10-CM

## 2022-11-18 NOTE — TELEPHONE ENCOUNTER
Please let patient know that the original sleep study that he was referred for was denied, but we've put in an order for a home sleep study. Thanks.

## 2022-12-05 ENCOUNTER — PROCEDURE VISIT (OUTPATIENT)
Dept: SLEEP MEDICINE | Facility: HOSPITAL | Age: 57
End: 2022-12-05
Attending: FAMILY MEDICINE
Payer: COMMERCIAL

## 2022-12-05 DIAGNOSIS — G47.10 HYPERSOMNIA: ICD-10-CM

## 2022-12-05 PROCEDURE — 95806 SLEEP STUDY UNATT&RESP EFFT: CPT

## 2022-12-14 ENCOUNTER — PATIENT MESSAGE (OUTPATIENT)
Dept: FAMILY MEDICINE | Facility: CLINIC | Age: 57
End: 2022-12-14

## 2022-12-14 DIAGNOSIS — G47.33 OBSTRUCTIVE SLEEP APNEA: Primary | ICD-10-CM

## 2023-01-04 ENCOUNTER — PATIENT MESSAGE (OUTPATIENT)
Dept: FAMILY MEDICINE | Facility: CLINIC | Age: 58
End: 2023-01-04

## 2023-01-20 ENCOUNTER — PROCEDURE VISIT (OUTPATIENT)
Dept: SLEEP MEDICINE | Facility: HOSPITAL | Age: 58
End: 2023-01-20
Attending: FAMILY MEDICINE
Payer: COMMERCIAL

## 2023-01-20 ENCOUNTER — OFFICE VISIT (OUTPATIENT)
Dept: FAMILY MEDICINE | Facility: CLINIC | Age: 58
End: 2023-01-20
Payer: COMMERCIAL

## 2023-01-20 VITALS
WEIGHT: 234 LBS | HEART RATE: 73 BPM | SYSTOLIC BLOOD PRESSURE: 146 MMHG | RESPIRATION RATE: 18 BRPM | OXYGEN SATURATION: 100 % | BODY MASS INDEX: 33.5 KG/M2 | DIASTOLIC BLOOD PRESSURE: 89 MMHG | HEIGHT: 70 IN

## 2023-01-20 DIAGNOSIS — Z23 NEED FOR VACCINATION: ICD-10-CM

## 2023-01-20 DIAGNOSIS — R03.0 ELEVATED BLOOD PRESSURE READING IN OFFICE WITHOUT DIAGNOSIS OF HYPERTENSION: ICD-10-CM

## 2023-01-20 DIAGNOSIS — F41.9 ANXIETY: Primary | ICD-10-CM

## 2023-01-20 DIAGNOSIS — R53.83 LOW ENERGY: ICD-10-CM

## 2023-01-20 DIAGNOSIS — G47.33 OBSTRUCTIVE SLEEP APNEA: ICD-10-CM

## 2023-01-20 DIAGNOSIS — R91.1 SOLITARY PULMONARY NODULE: ICD-10-CM

## 2023-01-20 PROCEDURE — 90677 PNEUMOCOCCAL CONJUGATE VACCINE 20-VALENT: ICD-10-PCS | Mod: S$GLB,,, | Performed by: FAMILY MEDICINE

## 2023-01-20 PROCEDURE — 1159F MED LIST DOCD IN RCRD: CPT | Mod: CPTII,S$GLB,, | Performed by: FAMILY MEDICINE

## 2023-01-20 PROCEDURE — 95811 POLYSOM 6/>YRS CPAP 4/> PARM: CPT

## 2023-01-20 PROCEDURE — 90677 PCV20 VACCINE IM: CPT | Mod: S$GLB,,, | Performed by: FAMILY MEDICINE

## 2023-01-20 PROCEDURE — 3077F PR MOST RECENT SYSTOLIC BLOOD PRESSURE >= 140 MM HG: ICD-10-PCS | Mod: CPTII,S$GLB,, | Performed by: FAMILY MEDICINE

## 2023-01-20 PROCEDURE — 3077F SYST BP >= 140 MM HG: CPT | Mod: CPTII,S$GLB,, | Performed by: FAMILY MEDICINE

## 2023-01-20 PROCEDURE — 99214 OFFICE O/P EST MOD 30 MIN: CPT | Mod: 25,S$GLB,, | Performed by: FAMILY MEDICINE

## 2023-01-20 PROCEDURE — 99214 PR OFFICE/OUTPT VISIT, EST, LEVL IV, 30-39 MIN: ICD-10-PCS | Mod: 25,S$GLB,, | Performed by: FAMILY MEDICINE

## 2023-01-20 PROCEDURE — 1159F PR MEDICATION LIST DOCUMENTED IN MEDICAL RECORD: ICD-10-PCS | Mod: CPTII,S$GLB,, | Performed by: FAMILY MEDICINE

## 2023-01-20 PROCEDURE — 3079F DIAST BP 80-89 MM HG: CPT | Mod: CPTII,S$GLB,, | Performed by: FAMILY MEDICINE

## 2023-01-20 PROCEDURE — 3008F BODY MASS INDEX DOCD: CPT | Mod: CPTII,S$GLB,, | Performed by: FAMILY MEDICINE

## 2023-01-20 PROCEDURE — 3079F PR MOST RECENT DIASTOLIC BLOOD PRESSURE 80-89 MM HG: ICD-10-PCS | Mod: CPTII,S$GLB,, | Performed by: FAMILY MEDICINE

## 2023-01-20 PROCEDURE — 90471 IMMUNIZATION ADMIN: CPT | Mod: S$GLB,,, | Performed by: FAMILY MEDICINE

## 2023-01-20 PROCEDURE — 90471 PNEUMOCOCCAL CONJUGATE VACCINE 20-VALENT: ICD-10-PCS | Mod: S$GLB,,, | Performed by: FAMILY MEDICINE

## 2023-01-20 PROCEDURE — 3008F PR BODY MASS INDEX (BMI) DOCUMENTED: ICD-10-PCS | Mod: CPTII,S$GLB,, | Performed by: FAMILY MEDICINE

## 2023-01-20 NOTE — PROGRESS NOTES
"  SUBJECTIVE:    Patient ID: Narciso Mckeon is a 57 y.o. male.    Chief Complaint: Follow-up and Anxiety    HPI  Narciso Mckeon is a 57 y.o. male who established care with me about 2 months ago. He has a hx of Aortic atherosclerosis, Hashitmoto's/Nodular thyroid disease/Hx Graves w Hyperthyroidism, HLD, RLS, NAFLD, Glaucoma, Chronic LBP. He comes in for follow up on Anxiety.    At last OV, he was started on Sertraline 50mg. Today, states multiple new stressors have been present since last visit including the passing away of his sister as well as other issues, so his anxiety has been "up and down." He does report that he felt the anxiety was exacerbated when he started the medication and he discontinued it, though he's not sure if it was due the drug or the circumstances. Overall, he feels that his anxiety is a little better now, and he is contemplating giving the Sertraline another shot. He denies any history of shreya or hypomania, and no family history of bipolar disease.      The patient continues to c/o low energy, and asks if he is a candidate for testosterone replacement based on his recent labs (Testosterone 363), as he feels this is the source of his fatigue. Of note, he will be undergoing a sleep study tonight for strong suspicion of sleep apnea.    He will soon be due for 6-month repeat Chest CT re: pulmonary nodule found on LDCT 8/19/22.    Other upcoming appointments include:  - Dr. Hui (Cardiology) 1/24/23 for angiogram.  - Dr. Pickens (Hepatology) 2/10/23 for Liver fibrosis/NAFLD.  - Dr. Michelle (Endocrinology) 2/20/23 for Hashimoto's and thyroid nodules.    HM: Pneumococcal vaccine (will get today)      Lab Visit on 11/04/2022   Component Date Value Ref Range Status    Testosterone, Total 11/04/2022 363  304 - 1227 ng/dL Final    WBC 11/04/2022 5.40  3.90 - 12.70 K/uL Final    RBC 11/04/2022 4.00 (L)  4.60 - 6.20 M/uL Final    Hemoglobin 11/04/2022 14.1  14.0 - 18.0 g/dL Final    " Hematocrit 11/04/2022 40.3  40.0 - 54.0 % Final    MCV 11/04/2022 101 (H)  82 - 98 fL Final    MCH 11/04/2022 35.3 (H)  27.0 - 31.0 pg Final    MCHC 11/04/2022 35.0  32.0 - 36.0 g/dL Final    RDW 11/04/2022 13.2  11.5 - 14.5 % Final    Platelets 11/04/2022 164  150 - 450 K/uL Final    MPV 11/04/2022 9.3  9.2 - 12.9 fL Final   Office Visit on 10/05/2022   Component Date Value Ref Range Status    Rapid Influenza A Ag 10/05/2022 Negative  Negative Final    Rapid Influenza B Ag 10/05/2022 Negative  Negative Final     Acceptable 10/05/2022 Yes   Final    Rapid Strep A Screen 10/05/2022 Negative  Negative Final     Acceptable 10/05/2022 Yes   Final   Lab Visit on 08/26/2022   Component Date Value Ref Range Status    Free T4 08/26/2022 0.94  0.71 - 1.51 ng/dL Final    T3, Total 08/26/2022 120  60 - 180 ng/dL Final    Thyroglobulin, Tumor Marker 08/26/2022 0.6 (H)  ng/mL Final    Thyroglobulin Antibody Screen 08/26/2022 22 (H)  <1.8 IU/mL Final    Thyroglobulin Interpretation 08/26/2022 SEE BELOW   Final    TSH 08/26/2022 1.008  0.400 - 4.000 uIU/mL Final    Sodium 08/26/2022 141  136 - 145 mmol/L Final    Potassium 08/26/2022 4.4  3.5 - 5.1 mmol/L Final    Chloride 08/26/2022 106  95 - 110 mmol/L Final    CO2 08/26/2022 25  23 - 29 mmol/L Final    Glucose 08/26/2022 92  70 - 110 mg/dL Final    BUN 08/26/2022 18  6 - 20 mg/dL Final    Creatinine 08/26/2022 1.1  0.5 - 1.4 mg/dL Final    Calcium 08/26/2022 9.3  8.7 - 10.5 mg/dL Final    Total Protein 08/26/2022 7.3  6.0 - 8.4 g/dL Final    Albumin 08/26/2022 4.2  3.5 - 5.2 g/dL Final    Total Bilirubin 08/26/2022 1.3 (H)  0.1 - 1.0 mg/dL Final    Alkaline Phosphatase 08/26/2022 95  55 - 135 U/L Final    AST 08/26/2022 26  10 - 40 U/L Final    ALT 08/26/2022 41  10 - 44 U/L Final    Anion Gap 08/26/2022 10  8 - 16 mmol/L Final    eGFR 08/26/2022 >60  >60 mL/min/1.73 m^2 Final    Ammonia 08/26/2022 26  10 - 50 umol/L Final    GGT 08/26/2022 235 (H)   8 - 55 U/L Final    Vit D, 25-Hydroxy 08/26/2022 55  30 - 96 ng/mL Final    PTH, Intact 08/26/2022 31.2  9.0 - 77.0 pg/mL Final    Ionized Calcium 08/26/2022 1.26  1.06 - 1.42 mmol/L Final    Ferritin 08/26/2022 320 (H)  20.0 - 300.0 ng/mL Final    Iron 08/26/2022 167 (H)  45 - 160 ug/dL Final    Transferrin 08/26/2022 257  200 - 375 mg/dL Final    TIBC 08/26/2022 380  250 - 450 ug/dL Final    Saturated Iron 08/26/2022 44  20 - 50 % Final    Transferrin 08/26/2022 257  200 - 375 mg/dL Final       Past Medical History:   Diagnosis Date    Arthritis     Chronic neck and back pain     DDD (degenerative disc disease), cervical     Graves disease     Hyperthyroidism 11/18/2015    Other specified glaucoma 2/16/2022     Past Surgical History:   Procedure Laterality Date    COLONOSCOPY N/A 9/5/2018    Procedure: COLONOSCOPY;  Surgeon: Keith Arellano MD;  Location: [facility];  Service: Endoscopy;  Laterality: N/A;    FINGER SURGERY      removal of steel    KNEE SURGERY Left 01/2015    repair     Family History   Problem Relation Age of Onset    Heart disease Father 52        MI    Alcohol abuse Father     Macular degeneration Mother     Melanoma Mother     Lupus Neg Hx     Eczema Neg Hx     Psoriasis Neg Hx     Glaucoma Neg Hx     Retinal detachment Neg Hx     Cirrhosis Neg Hx        Tobacco History:  reports that he quit smoking about 8 years ago. He has a 40.00 pack-year smoking history. He has never used smokeless tobacco.  Alcohol History:  reports current alcohol use of about 2.0 - 3.0 standard drinks per week.  Drug History:  reports no history of drug use.    Review of patient's allergies indicates:   Allergen Reactions    Bactrim [sulfamethoxazole-trimethoprim]      Possibly allergic reaction       Current Outpatient Medications:     albuterol (VENTOLIN HFA) 90 mcg/actuation inhaler, Inhale 1-2 puffs into the lungs every 6 (six) hours as needed for Wheezing or Shortness of Breath. Rescue, Disp: 18 g, Rfl: 0     "aspirin (ECOTRIN) 81 MG EC tablet, TAKE ONE TABLET BY MOUTH ONCE DAILY, Disp: 90 tablet, Rfl: 3    b complex vitamins tablet, Take 1 tablet by mouth once daily., Disp: , Rfl:     betamethasone dipropionate (DIPROLENE) 0.05 % ointment, AAA hand BID PRN flare, Disp: 45 g, Rfl: 1    clobetasol (TEMOVATE) 0.05 % cream, Thin film to AA hands BID PRN flare, Disp: 45 g, Rfl: 1    gabapentin (NEURONTIN) 300 MG capsule, Take 1 capsule (300 mg total) by mouth 3 (three) times daily., Disp: 90 capsule, Rfl: 0    meclizine (ANTIVERT) 25 mg tablet, TAKE ONE TABLET BY MOUTH THREE TIMES DAILY AS NEEDED, Disp: 30 tablet, Rfl: 1    meloxicam (MOBIC) 15 MG tablet, Take 15 mg by mouth once daily., Disp: , Rfl:     morphine (MS CONTIN) 15 MG 12 hr tablet, Take 15 mg by mouth as needed., Disp: , Rfl:     multivitamin capsule, Take 1 capsule by mouth once daily., Disp: , Rfl:     oxyCODONE (ROXICODONE) 15 MG Tab, Take 15 mg by mouth daily as needed., Disp: , Rfl:     rosuvastatin (CRESTOR) 20 MG tablet, TAKE ONE TABLET BY MOUTH EVERY EVENING, Disp: 90 tablet, Rfl: 0    sertraline (ZOLOFT) 50 MG tablet, Take half tab PO daily x 7 days, then increase to 1 tab PO daily., Disp: 90 tablet, Rfl: 1    tizanidine (ZANAFLEX) 4 MG tablet, Take 2 mg by mouth every 8 (eight) hours. , Disp: , Rfl:     vitamin E 200 UNIT capsule, Take 200 Units by mouth once daily., Disp: , Rfl:     Review of Systems  As in HPI           Objective:      Vitals:    01/20/23 1352 01/20/23 1424   BP: (!) 157/90 (!) 146/89   Pulse: 69 73   Resp: 18    SpO2: 100%    Weight: 106.1 kg (234 lb)    Height: 5' 10" (1.778 m)      Physical Exam  Vitals (BP mildly elevated today in setting of back pain; previously wnl) reviewed.   Constitutional:       General: He is not in acute distress.     Appearance: He is not ill-appearing.   Cardiovascular:      Rate and Rhythm: Normal rate and regular rhythm.      Pulses: Normal pulses.      Heart sounds: Normal heart sounds.   Pulmonary: "      Effort: Pulmonary effort is normal.      Breath sounds: Normal breath sounds.   Musculoskeletal:      Right lower leg: No edema.      Left lower leg: No edema.   Skin:     General: Skin is warm and dry.   Neurological:      General: No focal deficit present.      Mental Status: He is alert and oriented to person, place, and time.   Psychiatric:         Mood and Affect: Mood normal.         Behavior: Behavior normal.            Assessment:       1. Anxiety    2. Low energy    3. Elevated blood pressure reading in office without diagnosis of hypertension    4. Solitary pulmonary nodule    5. Need for vaccination             Plan:       Anxiety    Low energy    Elevated blood pressure reading in office without diagnosis of hypertension    Solitary pulmonary nodule  -     CT Chest Without Contrast; Future; Expected date: 01/20/2023    Need for vaccination  -     (In Office Administered) Pneumococcal Conjugate Vaccine (20 Valent) (IM)      - Anxiety is improved, though not resolved. Will trial Sertraline again. If adverse effects, he will notify me and we'll try different anxiolytic.  - Regarding his low energy, I have discussed that if he has untreated sleep apnea, this is the most likely etiology. He will be doing sleep study tonight; if positive for KHOA, strongly recommend CPAP for improvement in symptoms. That said, he also has thyroid disease as well as hepatic disease, both of which can contribute to lower energy. I advised patient that I would not treat with testosterone replacement given his lab value was within normal limits, however encouraged him to discuss further with his endocrinologist if he'd like to pursue a second opinion.  - BP elevated in office today, likely 2/2 acute on chronic back pain. He does not carry a diagnosis of HTN at this time, but will monitor.  - Order placed for repeat chest CT.  - PCV20 vaccination given today.     Follow up in about 3 months (around 4/20/2023) for low  energy/anxiety. Or sooner prn.        1/22/2023 Yumiko Chavarria M.D.

## 2023-01-21 NOTE — PATIENT INSTRUCTIONS
CPAP was performed and pt was informed to call his referring MD in 3 to 5 business days to make a f/u appt

## 2023-01-22 PROBLEM — R03.0 ELEVATED BLOOD PRESSURE READING IN OFFICE WITHOUT DIAGNOSIS OF HYPERTENSION: Status: ACTIVE | Noted: 2023-01-22

## 2023-01-22 PROBLEM — F41.9 ANXIETY: Status: ACTIVE | Noted: 2023-01-22

## 2023-01-22 PROBLEM — R91.1 SOLITARY PULMONARY NODULE: Status: ACTIVE | Noted: 2023-01-22

## 2023-02-01 ENCOUNTER — PATIENT MESSAGE (OUTPATIENT)
Dept: FAMILY MEDICINE | Facility: CLINIC | Age: 58
End: 2023-02-01

## 2023-02-01 DIAGNOSIS — G47.33 OBSTRUCTIVE SLEEP APNEA: Primary | ICD-10-CM

## 2023-02-10 ENCOUNTER — PROCEDURE VISIT (OUTPATIENT)
Dept: HEPATOLOGY | Facility: CLINIC | Age: 58
End: 2023-02-10
Payer: COMMERCIAL

## 2023-02-10 ENCOUNTER — OFFICE VISIT (OUTPATIENT)
Dept: HEPATOLOGY | Facility: CLINIC | Age: 58
End: 2023-02-10
Payer: COMMERCIAL

## 2023-02-10 VITALS
HEIGHT: 70 IN | HEART RATE: 77 BPM | TEMPERATURE: 98 F | WEIGHT: 226 LBS | DIASTOLIC BLOOD PRESSURE: 92 MMHG | RESPIRATION RATE: 18 BRPM | SYSTOLIC BLOOD PRESSURE: 161 MMHG | BODY MASS INDEX: 32.35 KG/M2 | OXYGEN SATURATION: 97 %

## 2023-02-10 DIAGNOSIS — K76.0 NAFLD (NONALCOHOLIC FATTY LIVER DISEASE): Primary | ICD-10-CM

## 2023-02-10 DIAGNOSIS — K76.0 NAFLD (NONALCOHOLIC FATTY LIVER DISEASE): ICD-10-CM

## 2023-02-10 PROCEDURE — 3077F SYST BP >= 140 MM HG: CPT | Mod: CPTII,S$GLB,, | Performed by: INTERNAL MEDICINE

## 2023-02-10 PROCEDURE — 3008F BODY MASS INDEX DOCD: CPT | Mod: CPTII,S$GLB,, | Performed by: INTERNAL MEDICINE

## 2023-02-10 PROCEDURE — 91200 LIVER ELASTOGRAPHY: CPT | Mod: S$GLB,,, | Performed by: INTERNAL MEDICINE

## 2023-02-10 PROCEDURE — 3008F PR BODY MASS INDEX (BMI) DOCUMENTED: ICD-10-PCS | Mod: CPTII,S$GLB,, | Performed by: INTERNAL MEDICINE

## 2023-02-10 PROCEDURE — 3077F PR MOST RECENT SYSTOLIC BLOOD PRESSURE >= 140 MM HG: ICD-10-PCS | Mod: CPTII,S$GLB,, | Performed by: INTERNAL MEDICINE

## 2023-02-10 PROCEDURE — 3080F PR MOST RECENT DIASTOLIC BLOOD PRESSURE >= 90 MM HG: ICD-10-PCS | Mod: CPTII,S$GLB,, | Performed by: INTERNAL MEDICINE

## 2023-02-10 PROCEDURE — 91200 FIBROSCAN NEW ORLEANS (VIBRATION CONTROLLED TRANSIENT ELASTOGRAPHY): ICD-10-PCS | Mod: S$GLB,,, | Performed by: INTERNAL MEDICINE

## 2023-02-10 PROCEDURE — 99999 PR PBB SHADOW E&M-EST. PATIENT-LVL III: ICD-10-PCS | Mod: PBBFAC,,, | Performed by: INTERNAL MEDICINE

## 2023-02-10 PROCEDURE — 99214 PR OFFICE/OUTPT VISIT, EST, LEVL IV, 30-39 MIN: ICD-10-PCS | Mod: S$GLB,,, | Performed by: INTERNAL MEDICINE

## 2023-02-10 PROCEDURE — 99999 PR PBB SHADOW E&M-EST. PATIENT-LVL III: CPT | Mod: PBBFAC,,, | Performed by: INTERNAL MEDICINE

## 2023-02-10 PROCEDURE — 3080F DIAST BP >= 90 MM HG: CPT | Mod: CPTII,S$GLB,, | Performed by: INTERNAL MEDICINE

## 2023-02-10 PROCEDURE — 99214 OFFICE O/P EST MOD 30 MIN: CPT | Mod: S$GLB,,, | Performed by: INTERNAL MEDICINE

## 2023-02-10 NOTE — PROGRESS NOTES
Subjective:       Patient ID: Narciso Mckeon is a 57 y.o. male.    Chief Complaint: No chief complaint on file.    HPI  I saw this 57 y.o. man back in the hepatology clinic for follow up.  He was last seen in Jan 2021 and has been followed her since 2016.    - raised GGTand ALP.  - likely alcohol related but also overweight  - has lost about 15 lb since he was last seen in July 2020  - diagnosed with hypothyroidism    Non-invasive fibrosis staging:  - Fibroscan- F0- 2016  - Fibroscan was in July 2020 - F2/S1     Fibroscan Jan 2021-: F0/S1    Body mass index is 32.42 kg/m².    HFE gene negative.  Alk Phos normal    Abdo US: 1/13/2021  Unremarkable appearance of the liver.  Upper normal size spleen.    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   HENT:  Negative for hearing loss.    Eyes:  Negative for discharge and visual disturbance.   Respiratory:  Negative for cough, chest tightness, shortness of breath and wheezing.    Cardiovascular:  Negative for chest pain, palpitations and leg swelling.   Gastrointestinal:  Negative for abdominal distention, abdominal pain, constipation, diarrhea and nausea.   Genitourinary:  Negative for dysuria and frequency.   Musculoskeletal:  Negative for arthralgias and back pain.   Skin:  Negative for pallor and rash.   Neurological:  Negative for dizziness, tremors, speech difficulty and headaches.   Hematological:  Negative for adenopathy.   Psychiatric/Behavioral:  Negative for agitation and confusion.          Lab Results    Component Value Date    ALT 41 08/26/2022    AST 26 08/26/2022     (H) 08/26/2022    ALKPHOS 95 08/26/2022    BILITOT 1.3 (H) 08/26/2022     Past Medical History:   Diagnosis Date    Arthritis     Chronic neck and back pain     DDD (degenerative disc disease), cervical     Graves disease     Hyperthyroidism 11/18/2015    Other specified glaucoma 2/16/2022     Past Surgical History:   Procedure Laterality Date    COLONOSCOPY N/A 9/5/2018    Procedure: COLONOSCOPY;  Surgeon: Keith Arellano MD;  Location: [facility];  Service: Endoscopy;  Laterality: N/A;    FINGER SURGERY      removal of steel    KNEE SURGERY Left 01/2015    repair     Current Outpatient Medications   Medication Sig    albuterol (VENTOLIN HFA) 90 mcg/actuation inhaler Inhale 1-2 puffs into the lungs every 6 (six) hours as needed for Wheezing or Shortness of Breath. Rescue    aspirin (ECOTRIN) 81 MG EC tablet TAKE ONE TABLET BY MOUTH ONCE DAILY    b complex vitamins tablet Take 1 tablet by mouth once daily.    betamethasone dipropionate (DIPROLENE) 0.05 % ointment AAA hand BID PRN flare    clobetasol (TEMOVATE) 0.05 % cream Thin film to AA hands BID PRN flare    gabapentin (NEURONTIN) 300 MG capsule Take 1 capsule (300 mg total) by mouth 3 (three) times daily.    meclizine (ANTIVERT) 25 mg tablet TAKE ONE TABLET BY MOUTH THREE TIMES DAILY AS NEEDED    meloxicam (MOBIC) 15 MG tablet Take 15 mg by mouth once daily.    morphine (MS CONTIN) 15 MG 12 hr tablet Take 15 mg by mouth as needed.    multivitamin capsule Take 1 capsule by mouth once daily.    oxyCODONE (ROXICODONE) 15 MG Tab Take 15 mg by mouth daily as needed.    rosuvastatin (CRESTOR) 20 MG tablet TAKE ONE TABLET BY MOUTH EVERY EVENING    sertraline (ZOLOFT) 50 MG tablet Take half tab PO daily x 7 days, then increase to 1 tab PO daily.    tizanidine (ZANAFLEX) 4 MG tablet Take 2 mg by mouth every 8 (eight) hours.     vitamin E 200 UNIT capsule Take 200 Units by mouth once daily.      No current facility-administered medications for this visit.       Objective:      Physical Exam  HENT:      Head: Normocephalic.   Eyes:      Pupils: Pupils are equal, round, and reactive to light.   Neck:      Thyroid: No thyromegaly.   Cardiovascular:      Rate and Rhythm: Normal rate and regular rhythm.      Heart sounds: Normal heart sounds.   Pulmonary:      Effort: Pulmonary effort is normal.      Breath sounds: Normal breath sounds. No wheezing.   Abdominal:      General: There is no distension.      Palpations: Abdomen is soft. There is no mass.      Tenderness: There is no abdominal tenderness.   Lymphadenopathy:      Cervical: No cervical adenopathy.   Skin:     General: Skin is warm.      Findings: No erythema or rash.   Neurological:      Mental Status: He is alert and oriented to person, place, and time.   Psychiatric:         Behavior: Behavior normal.       Assessment:       1. NAFLD (nonalcoholic fatty liver disease)          Plan:   Overweight but no evidence of advanced liver fibrosis.  - presumed NAFLD  - has been exercising and has gained muscle  - reassured that he does not have any significant liver disease  - reassuring fibroscans- today's scan shows F0/S0    - Discussed continuing reduced carbs + increased protein intake  - labs and US and if reassuring, he will not need further follow up.

## 2023-02-10 NOTE — PROCEDURES
FibroScan [place] (Vibration Controlled Transient Elastography)    Date/Time: 2/10/2023 1:45 PM  Performed by: Narciso Pickens MD  Authorized by: Narciso Pickens MD     Diagnosis:  NAFLD    Probe:  M    Universal Protocol: Patient's identity, procedure and site were verified, confirmatory pause was performed.  Discussed procedure including risks and potential complications.  Questions answered.  Patient verbalizes understanding and wishes to proceed with VCTE.     Procedure: After providing explanations of the procedure, patient was placed in the supine position with right arm in maximum abduction to allow optimal exposure of right lateral abdomen.  Patient was briefly assessed, Testing was performed in the mid-axillary location, 50Hz Shear Wave pulses were applied and the resulting Shear Wave and Propagation Speed detected with a 3.5 MHz ultrasonic signal, using the FibroScan probe, Skin to liver capsule distance and liver parenchyma were accessed during the entire examination with the FibroScan probe, Patient was instructed to breathe normally and to abstain from sudden movements during the procedure, allowing for random measurements of liver stiffness. At least 10 Shear Waves were produced, Individual measurements of each Shear Wave were calculated.  Patient tolerated the procedure well with no complications.  Meets discharge criteria as was dismissed.  Rates pain 0 out of 10.  Patient will follow up with ordering provider to review results.    Findings  Median liver stiffness score:  7.3  CAP Reading: dB/m:  229    IQR/med %:  7  Interpretation  Fibrosis interpretation is based on medial liver stiffness - Kilopascal (kPa).    Fibrosis Stage:  F 0-1  Steatosis interpretation is based on controlled attenuation parameter - (dB/m).    Steatosis Grade:  <S1

## 2023-02-14 ENCOUNTER — HOSPITAL ENCOUNTER (OUTPATIENT)
Dept: RADIOLOGY | Facility: HOSPITAL | Age: 58
Discharge: HOME | End: 2023-02-14
Attending: INTERNAL MEDICINE
Payer: COMMERCIAL

## 2023-02-14 DIAGNOSIS — K76.0 NAFLD (NONALCOHOLIC FATTY LIVER DISEASE): ICD-10-CM

## 2023-02-14 PROCEDURE — 76705 ECHO EXAM OF ABDOMEN: CPT | Mod: 26,,, | Performed by: RADIOLOGY

## 2023-02-14 PROCEDURE — 76705 US ABDOMEN LIMITED_LIVER: ICD-10-PCS | Mod: 26,,, | Performed by: RADIOLOGY

## 2023-02-14 PROCEDURE — 76705 ECHO EXAM OF ABDOMEN: CPT | Mod: TC

## 2023-02-20 ENCOUNTER — OFFICE VISIT (OUTPATIENT)
Dept: CARDIOLOGY | Facility: CLINIC | Age: 58
End: 2023-02-20
Payer: COMMERCIAL

## 2023-02-20 VITALS
DIASTOLIC BLOOD PRESSURE: 90 MMHG | SYSTOLIC BLOOD PRESSURE: 142 MMHG | HEART RATE: 80 BPM | RESPIRATION RATE: 16 BRPM | BODY MASS INDEX: 32.78 KG/M2 | OXYGEN SATURATION: 98 % | WEIGHT: 229 LBS | HEIGHT: 70 IN

## 2023-02-20 DIAGNOSIS — I10 ESSENTIAL HYPERTENSION: ICD-10-CM

## 2023-02-20 DIAGNOSIS — E78.2 MIXED HYPERLIPIDEMIA: ICD-10-CM

## 2023-02-20 DIAGNOSIS — R07.89 OTHER CHEST PAIN: Primary | ICD-10-CM

## 2023-02-20 DIAGNOSIS — E05.00 GRAVES DISEASE: ICD-10-CM

## 2023-02-20 DIAGNOSIS — E03.9 ACQUIRED HYPOTHYROIDISM: ICD-10-CM

## 2023-02-20 PROCEDURE — 3008F BODY MASS INDEX DOCD: CPT | Mod: CPTII,S$GLB,, | Performed by: INTERNAL MEDICINE

## 2023-02-20 PROCEDURE — 99205 PR OFFICE/OUTPT VISIT, NEW, LEVL V, 60-74 MIN: ICD-10-PCS | Mod: S$GLB,,, | Performed by: INTERNAL MEDICINE

## 2023-02-20 PROCEDURE — 1159F MED LIST DOCD IN RCRD: CPT | Mod: CPTII,S$GLB,, | Performed by: INTERNAL MEDICINE

## 2023-02-20 PROCEDURE — 3080F PR MOST RECENT DIASTOLIC BLOOD PRESSURE >= 90 MM HG: ICD-10-PCS | Mod: CPTII,S$GLB,, | Performed by: INTERNAL MEDICINE

## 2023-02-20 PROCEDURE — 99999 PR PBB SHADOW E&M-EST. PATIENT-LVL IV: CPT | Mod: PBBFAC,,, | Performed by: INTERNAL MEDICINE

## 2023-02-20 PROCEDURE — 3077F SYST BP >= 140 MM HG: CPT | Mod: CPTII,S$GLB,, | Performed by: INTERNAL MEDICINE

## 2023-02-20 PROCEDURE — 99999 PR PBB SHADOW E&M-EST. PATIENT-LVL IV: ICD-10-PCS | Mod: PBBFAC,,, | Performed by: INTERNAL MEDICINE

## 2023-02-20 PROCEDURE — 1160F RVW MEDS BY RX/DR IN RCRD: CPT | Mod: CPTII,S$GLB,, | Performed by: INTERNAL MEDICINE

## 2023-02-20 PROCEDURE — 3080F DIAST BP >= 90 MM HG: CPT | Mod: CPTII,S$GLB,, | Performed by: INTERNAL MEDICINE

## 2023-02-20 PROCEDURE — 99205 OFFICE O/P NEW HI 60 MIN: CPT | Mod: S$GLB,,, | Performed by: INTERNAL MEDICINE

## 2023-02-20 PROCEDURE — 3077F PR MOST RECENT SYSTOLIC BLOOD PRESSURE >= 140 MM HG: ICD-10-PCS | Mod: CPTII,S$GLB,, | Performed by: INTERNAL MEDICINE

## 2023-02-20 PROCEDURE — 93000 ELECTROCARDIOGRAM COMPLETE: CPT | Mod: S$GLB,,, | Performed by: INTERNAL MEDICINE

## 2023-02-20 PROCEDURE — 1160F PR REVIEW ALL MEDS BY PRESCRIBER/CLIN PHARMACIST DOCUMENTED: ICD-10-PCS | Mod: CPTII,S$GLB,, | Performed by: INTERNAL MEDICINE

## 2023-02-20 PROCEDURE — 3008F PR BODY MASS INDEX (BMI) DOCUMENTED: ICD-10-PCS | Mod: CPTII,S$GLB,, | Performed by: INTERNAL MEDICINE

## 2023-02-20 PROCEDURE — 1159F PR MEDICATION LIST DOCUMENTED IN MEDICAL RECORD: ICD-10-PCS | Mod: CPTII,S$GLB,, | Performed by: INTERNAL MEDICINE

## 2023-02-20 PROCEDURE — 93000 EKG 12-LEAD: ICD-10-PCS | Mod: S$GLB,,, | Performed by: INTERNAL MEDICINE

## 2023-02-20 NOTE — PROGRESS NOTES
Subjective:    Patient ID:  Narciso Mckeon is a 57 y.o. male     Chief Complaint   Patient presents with    Chest Pain    Hyperlipidemia       HPI:  Mr Narciso Mckeon is a 57 y.o. male is here for initial consultation.    Patient has been doing well except that intermittently he has been having transient chest pain which is a sharp shooting pain lasts for few seconds and goes away.  And is usually when he is resting and watching TV.    Patient is on a very active gentleman he works out in the gym at least 3 to 4 times a week and he works out for an hour and he works on all the machines and lifts heavy work.  And he does run on treadmill and recently ran about 15 minutes without any issues.    Denies any shortness of breath or difficulty in breathing denies any dizziness or lightheadedness or loss of consciousness.  Intermittently patient has been having palpitations.  And sometimes he feels that fluttering sensation coming and going.        Review of patient's allergies indicates:   Allergen Reactions    Bactrim [sulfamethoxazole-trimethoprim]      Possibly allergic reaction       Past Medical History:   Diagnosis Date    Arthritis     Chronic neck and back pain     DDD (degenerative disc disease), cervical     Graves disease     Hyperthyroidism 2015    Other specified glaucoma 2022     Past Surgical History:   Procedure Laterality Date    COLONOSCOPY N/A 2018    Procedure: COLONOSCOPY;  Surgeon: Keith Arellano MD;  Location: [facility];  Service: Endoscopy;  Laterality: N/A;    FINGER SURGERY      removal of steel    KNEE SURGERY Left 2015    repair     Social History     Tobacco Use    Smoking status: Former     Packs/day: 2.00     Years: 20.00     Pack years: 40.00     Types: Cigarettes     Quit date: 2014     Years since quittin.3    Smokeless tobacco: Never   Substance Use Topics    Alcohol use: Yes     Alcohol/week: 2.0 - 3.0 standard drinks     Types: 2 - 3 Cans  of beer per week     Comment: 2 times a week     Drug use: No     Family History   Problem Relation Age of Onset    Heart disease Father 52        MI    Alcohol abuse Father     Macular degeneration Mother     Melanoma Mother     Lupus Neg Hx     Eczema Neg Hx     Psoriasis Neg Hx     Glaucoma Neg Hx     Retinal detachment Neg Hx     Cirrhosis Neg Hx         Review of Systems:   Constitution: Negative for diaphoresis and fever.   HEENT: Negative for nosebleeds.    Cardiovascular:  Positive for chest pain       No dyspnea on exertion       No leg swelling        No palpitations  Respiratory: Negative for shortness of breath and wheezing.    Hematologic/Lymphatic: Negative for bleeding problem. Does not bruise/bleed easily.   Skin: Negative for color change and rash.   Musculoskeletal: Negative for falls and myalgias.   Gastrointestinal: Negative for hematemesis and hematochezia.   Genitourinary: Negative for hematuria.   Neurological: Negative for dizziness and light-headedness.   Psychiatric/Behavioral: Negative for altered mental status and memory loss.          Objective:        Vitals:    02/20/23 1544   BP: (!) 142/90   Pulse: 80   Resp: 16       Lab Results   Component Value Date    WBC 4.93 02/14/2023    HGB 15.0 02/14/2023    HCT 43.3 02/14/2023     02/14/2023    CHOL 182 01/25/2022    TRIG 211 (H) 01/25/2022    HDL 51 01/25/2022    ALT 29 02/14/2023    AST 21 02/14/2023     02/14/2023    K 4.5 02/14/2023     02/14/2023    CREATININE 1.1 02/14/2023    BUN 20 02/14/2023    CO2 23 02/14/2023    TSH 1.008 08/26/2022    INR 1.0 07/06/2020    HGBA1C 4.6 01/25/2022        ECHOCARDIOGRAM RESULTS  Results for orders placed during the hospital encounter of 10/15/18    STRESS TEST REPORT        CURRENT/PREVIOUS VISIT EKG    No results found for this or any previous visit.      Physical Exam:  CONSTITUTIONAL: No fever, no chills  HEENT: Normocephalic, atraumatic,pupils reactive to light                  NECK:  No JVD no carotid bruit  CVS: S1S2+, RRR, faint systolic murmurs,   LUNGS: Clear  ABDOMEN: Soft, NT, BS+  EXTREMITIES: No cyanosis, edema  : No rivas catheter  NEURO: AAO X 3  PSY: Normal affect      Medication List with Changes/Refills   Current Medications    ALBUTEROL (VENTOLIN HFA) 90 MCG/ACTUATION INHALER    Inhale 1-2 puffs into the lungs every 6 (six) hours as needed for Wheezing or Shortness of Breath. Rescue    ASPIRIN (ECOTRIN) 81 MG EC TABLET    TAKE ONE TABLET BY MOUTH ONCE DAILY    B COMPLEX VITAMINS TABLET    Take 1 tablet by mouth once daily.    BETAMETHASONE DIPROPIONATE (DIPROLENE) 0.05 % OINTMENT    AAA hand BID PRN flare    CLOBETASOL (TEMOVATE) 0.05 % CREAM    Thin film to AA hands BID PRN flare    GABAPENTIN (NEURONTIN) 300 MG CAPSULE    Take 1 capsule (300 mg total) by mouth 3 (three) times daily.    MECLIZINE (ANTIVERT) 25 MG TABLET    TAKE ONE TABLET BY MOUTH THREE TIMES DAILY AS NEEDED    MELOXICAM (MOBIC) 15 MG TABLET    Take 15 mg by mouth once daily.    MORPHINE (MS CONTIN) 15 MG 12 HR TABLET    Take 15 mg by mouth as needed.    MULTIVITAMIN CAPSULE    Take 1 capsule by mouth once daily.    OXYCODONE (ROXICODONE) 15 MG TAB    Take 15 mg by mouth daily as needed.    ROSUVASTATIN (CRESTOR) 20 MG TABLET    TAKE ONE TABLET BY MOUTH EVERY EVENING    SERTRALINE (ZOLOFT) 50 MG TABLET    Take half tab PO daily x 7 days, then increase to 1 tab PO daily.    TIZANIDINE (ZANAFLEX) 4 MG TABLET    Take 2 mg by mouth every 8 (eight) hours.     VITAMIN E 200 UNIT CAPSULE    Take 200 Units by mouth once daily.             Assessment:       1. Other chest pain    2. Mixed hyperlipidemia    3. Acquired hypothyroidism    4. Graves disease    5. Essential hypertension         Plan:     1. Chest pain.    Patient has been having intermittent chest pain and is transient and sharp shooting pain.  Not associated with exertion.    Will schedule him for exercise stress Cardiolite study to evaluate for  ischemia.    2. Mixed hyperlipidemia   Patient is currently on Crestor 20 mg p.o. daily continue the same.  On his last blood work he had a total cholesterol of 182 HDL of 51 LDL of 88 and triglycerides of 211.  Patient to follow-up with his primary physician for blood work and liver function test.  3. Graves disease/acquired hypothyroidism   Patient is currently stabilizing his thyroid function test.  He has had Graves disease and was later found to have hypothyroidism and he follows up with his primary physician.    4. Essential hypertension  Today patient's blood pressure is mildly elevated 142/90.  Patient to check his blood pressure at home.    Will also do a 2D echocardiogram for LV function ejection fraction wall motion abnormality.  5. EKG  Reviewed his EKG independently patient is in normal sinus rhythm with a heart rate of 80 beats per minute normal intervals.  No acute ST T-wave changes.  Essentially within normal limits.    6. Patient is currently on aspirin 81 mg p.o. daily continue the same.    7. Patient has other comorbid conditions including Oakley and it has improved and his labs have come back to be much better.  8. I will see him back in the office after the testing has been completed.        Problem List Items Addressed This Visit          Cardiac/Vascular    Hyperlipidemia       Endocrine    Graves disease     Other Visit Diagnoses       Other chest pain    -  Primary    Acquired hypothyroidism        Essential hypertension                No follow-ups on file.

## 2023-02-24 ENCOUNTER — HOSPITAL ENCOUNTER (OUTPATIENT)
Dept: RADIOLOGY | Facility: HOSPITAL | Age: 58
Discharge: HOME | End: 2023-02-24
Attending: FAMILY MEDICINE
Payer: COMMERCIAL

## 2023-02-24 DIAGNOSIS — R91.1 SOLITARY PULMONARY NODULE: ICD-10-CM

## 2023-02-24 PROCEDURE — 71250 CT THORAX DX C-: CPT | Mod: TC,PO

## 2023-02-26 ENCOUNTER — PATIENT MESSAGE (OUTPATIENT)
Dept: HEPATOLOGY | Facility: CLINIC | Age: 58
End: 2023-02-26
Payer: COMMERCIAL

## 2023-02-26 DIAGNOSIS — D13.5 ADENOMYOMATOSIS OF GALLBLADDER: Primary | ICD-10-CM

## 2023-02-27 ENCOUNTER — PATIENT MESSAGE (OUTPATIENT)
Dept: FAMILY MEDICINE | Facility: CLINIC | Age: 58
End: 2023-02-27

## 2023-03-03 ENCOUNTER — TELEPHONE (OUTPATIENT)
Dept: FAMILY MEDICINE | Facility: CLINIC | Age: 58
End: 2023-03-03

## 2023-03-03 NOTE — TELEPHONE ENCOUNTER
Called and spoke with pt's wife (Anya Mckeon).   Will send CPAP prescription to local DME supplier in [place] and alert her to where it's been accepted.

## 2023-03-20 ENCOUNTER — OFFICE VISIT (OUTPATIENT)
Dept: ENDOCRINOLOGY | Facility: CLINIC | Age: 58
End: 2023-03-20
Payer: COMMERCIAL

## 2023-03-20 ENCOUNTER — TELEPHONE (OUTPATIENT)
Dept: CARDIOLOGY | Facility: HOSPITAL | Age: 58
End: 2023-03-20

## 2023-03-20 ENCOUNTER — LAB VISIT (OUTPATIENT)
Dept: LAB | Facility: HOSPITAL | Age: 58
End: 2023-03-20
Attending: INTERNAL MEDICINE
Payer: MEDICARE

## 2023-03-20 VITALS
BODY MASS INDEX: 32.73 KG/M2 | HEIGHT: 70 IN | TEMPERATURE: 98 F | SYSTOLIC BLOOD PRESSURE: 148 MMHG | HEART RATE: 94 BPM | WEIGHT: 228.63 LBS | OXYGEN SATURATION: 97 % | DIASTOLIC BLOOD PRESSURE: 88 MMHG

## 2023-03-20 DIAGNOSIS — N43.3 HYDROCELE, UNSPECIFIED HYDROCELE TYPE: ICD-10-CM

## 2023-03-20 DIAGNOSIS — E55.9 HYPOVITAMINOSIS D: ICD-10-CM

## 2023-03-20 DIAGNOSIS — N40.0 BENIGN PROSTATIC HYPERPLASIA, UNSPECIFIED WHETHER LOWER URINARY TRACT SYMPTOMS PRESENT: ICD-10-CM

## 2023-03-20 DIAGNOSIS — Z86.39 HISTORY OF GRAVES' DISEASE: ICD-10-CM

## 2023-03-20 DIAGNOSIS — E78.00 HYPERCHOLESTEROLEMIA: ICD-10-CM

## 2023-03-20 DIAGNOSIS — E04.9 GOITER: ICD-10-CM

## 2023-03-20 DIAGNOSIS — R73.09 DYSGLYCEMIA: ICD-10-CM

## 2023-03-20 DIAGNOSIS — E78.2 MIXED HYPERLIPIDEMIA: ICD-10-CM

## 2023-03-20 DIAGNOSIS — R79.89 ABNORMAL THYROID BLOOD TEST: ICD-10-CM

## 2023-03-20 DIAGNOSIS — Z86.010 HX OF COLONIC POLYPS: ICD-10-CM

## 2023-03-20 DIAGNOSIS — E88.810 DYSMETABOLIC SYNDROME: ICD-10-CM

## 2023-03-20 DIAGNOSIS — E06.3 HASHIMOTO'S DISEASE: ICD-10-CM

## 2023-03-20 DIAGNOSIS — R16.1 SPLENOMEGALY: ICD-10-CM

## 2023-03-20 DIAGNOSIS — E66.9 OBESITY (BMI 30-39.9): ICD-10-CM

## 2023-03-20 DIAGNOSIS — E04.1 NODULAR THYROID DISEASE: Primary | ICD-10-CM

## 2023-03-20 DIAGNOSIS — R53.82 CHRONIC FATIGUE: ICD-10-CM

## 2023-03-20 DIAGNOSIS — Z86.39 HISTORY OF THYROTOXICOSIS: ICD-10-CM

## 2023-03-20 DIAGNOSIS — G25.81 RESTLESS LEG SYNDROME: ICD-10-CM

## 2023-03-20 DIAGNOSIS — N50.3 EPIDIDYMAL CYST: ICD-10-CM

## 2023-03-20 LAB
CA-I BLDV-SCNC: 1.26 MMOL/L (ref 1.06–1.42)
CHOLEST SERPL-MCNC: 194 MG/DL (ref 120–199)
CHOLEST/HDLC SERPL: 3.2 {RATIO} (ref 2–5)
ESTIMATED AVG GLUCOSE: 91 MG/DL (ref 68–131)
HBA1C MFR BLD: 4.8 % (ref 4–5.6)
HDLC SERPL-MCNC: 60 MG/DL (ref 40–75)
HDLC SERPL: 30.9 % (ref 20–50)
LDLC SERPL CALC-MCNC: 103.8 MG/DL (ref 63–159)
NONHDLC SERPL-MCNC: 134 MG/DL
PTH-INTACT SERPL-MCNC: 43.1 PG/ML (ref 9–77)
TRIGL SERPL-MCNC: 151 MG/DL (ref 30–150)
URATE SERPL-MCNC: 7.4 MG/DL (ref 3.4–7)

## 2023-03-20 PROCEDURE — 83002 ASSAY OF GONADOTROPIN (LH): CPT | Performed by: INTERNAL MEDICINE

## 2023-03-20 PROCEDURE — 84153 ASSAY OF PSA TOTAL: CPT | Performed by: INTERNAL MEDICINE

## 2023-03-20 PROCEDURE — 82672 ASSAY OF ESTROGEN: CPT | Performed by: INTERNAL MEDICINE

## 2023-03-20 PROCEDURE — 3077F SYST BP >= 140 MM HG: CPT | Mod: CPTII,S$GLB,, | Performed by: INTERNAL MEDICINE

## 2023-03-20 PROCEDURE — 82670 ASSAY OF TOTAL ESTRADIOL: CPT | Performed by: INTERNAL MEDICINE

## 2023-03-20 PROCEDURE — 83001 ASSAY OF GONADOTROPIN (FSH): CPT | Performed by: INTERNAL MEDICINE

## 2023-03-20 PROCEDURE — 80061 LIPID PANEL: CPT | Performed by: INTERNAL MEDICINE

## 2023-03-20 PROCEDURE — 84443 ASSAY THYROID STIM HORMONE: CPT | Performed by: INTERNAL MEDICINE

## 2023-03-20 PROCEDURE — 84480 ASSAY TRIIODOTHYRONINE (T3): CPT | Performed by: INTERNAL MEDICINE

## 2023-03-20 PROCEDURE — 82330 ASSAY OF CALCIUM: CPT | Performed by: INTERNAL MEDICINE

## 2023-03-20 PROCEDURE — 84403 ASSAY OF TOTAL TESTOSTERONE: CPT | Performed by: INTERNAL MEDICINE

## 2023-03-20 PROCEDURE — 82642 DIHYDROTESTOSTERONE: CPT | Performed by: INTERNAL MEDICINE

## 2023-03-20 PROCEDURE — 83970 ASSAY OF PARATHORMONE: CPT | Performed by: INTERNAL MEDICINE

## 2023-03-20 PROCEDURE — 3079F PR MOST RECENT DIASTOLIC BLOOD PRESSURE 80-89 MM HG: ICD-10-PCS | Mod: CPTII,S$GLB,, | Performed by: INTERNAL MEDICINE

## 2023-03-20 PROCEDURE — 84439 ASSAY OF FREE THYROXINE: CPT | Performed by: INTERNAL MEDICINE

## 2023-03-20 PROCEDURE — 84270 ASSAY OF SEX HORMONE GLOBUL: CPT | Performed by: INTERNAL MEDICINE

## 2023-03-20 PROCEDURE — 3077F PR MOST RECENT SYSTOLIC BLOOD PRESSURE >= 140 MM HG: ICD-10-PCS | Mod: CPTII,S$GLB,, | Performed by: INTERNAL MEDICINE

## 2023-03-20 PROCEDURE — 3008F BODY MASS INDEX DOCD: CPT | Mod: CPTII,S$GLB,, | Performed by: INTERNAL MEDICINE

## 2023-03-20 PROCEDURE — 99999 PR PBB SHADOW E&M-EST. PATIENT-LVL IV: CPT | Mod: PBBFAC,,, | Performed by: INTERNAL MEDICINE

## 2023-03-20 PROCEDURE — 86800 THYROGLOBULIN ANTIBODY: CPT | Performed by: INTERNAL MEDICINE

## 2023-03-20 PROCEDURE — 99999 PR PBB SHADOW E&M-EST. PATIENT-LVL IV: ICD-10-PCS | Mod: PBBFAC,,, | Performed by: INTERNAL MEDICINE

## 2023-03-20 PROCEDURE — 1159F MED LIST DOCD IN RCRD: CPT | Mod: CPTII,S$GLB,, | Performed by: INTERNAL MEDICINE

## 2023-03-20 PROCEDURE — 36415 COLL VENOUS BLD VENIPUNCTURE: CPT | Mod: PO | Performed by: INTERNAL MEDICINE

## 2023-03-20 PROCEDURE — 1160F RVW MEDS BY RX/DR IN RCRD: CPT | Mod: CPTII,S$GLB,, | Performed by: INTERNAL MEDICINE

## 2023-03-20 PROCEDURE — 99214 OFFICE O/P EST MOD 30 MIN: CPT | Mod: S$GLB,,, | Performed by: INTERNAL MEDICINE

## 2023-03-20 PROCEDURE — 3008F PR BODY MASS INDEX (BMI) DOCUMENTED: ICD-10-PCS | Mod: CPTII,S$GLB,, | Performed by: INTERNAL MEDICINE

## 2023-03-20 PROCEDURE — 1160F PR REVIEW ALL MEDS BY PRESCRIBER/CLIN PHARMACIST DOCUMENTED: ICD-10-PCS | Mod: CPTII,S$GLB,, | Performed by: INTERNAL MEDICINE

## 2023-03-20 PROCEDURE — 84550 ASSAY OF BLOOD/URIC ACID: CPT | Performed by: INTERNAL MEDICINE

## 2023-03-20 PROCEDURE — 83036 HEMOGLOBIN GLYCOSYLATED A1C: CPT | Performed by: INTERNAL MEDICINE

## 2023-03-20 PROCEDURE — 99214 PR OFFICE/OUTPT VISIT, EST, LEVL IV, 30-39 MIN: ICD-10-PCS | Mod: S$GLB,,, | Performed by: INTERNAL MEDICINE

## 2023-03-20 PROCEDURE — 1159F PR MEDICATION LIST DOCUMENTED IN MEDICAL RECORD: ICD-10-PCS | Mod: CPTII,S$GLB,, | Performed by: INTERNAL MEDICINE

## 2023-03-20 PROCEDURE — 82306 VITAMIN D 25 HYDROXY: CPT | Performed by: INTERNAL MEDICINE

## 2023-03-20 PROCEDURE — 3079F DIAST BP 80-89 MM HG: CPT | Mod: CPTII,S$GLB,, | Performed by: INTERNAL MEDICINE

## 2023-03-20 NOTE — TELEPHONE ENCOUNTER
Patient advised, test will be at [facility] ([facility]   Will need to register on the first floor at the main entrance.   Patient advised that arrival time is 6:30am.  Patient advised that he may be here about 3.5-4 hours, and may want to bring something to occupy their time, as there will be periods of waiting.    Patient advised, may take his medications prior to testing if you need to.  Advised if he needs to eat to take his medications, please keep it light, like toast and juice.    Patient advised to avoid all caffeine 12 hours prior to testing.  This includes decaf tea and coffee.    Will provide peanut butter crackers for a snack after stress test.  If patient would prefer something else, please bring a snack from home.    Wear comfortable clothing.   No lotions, oils, or powders to the upper chest area. May wear deodorant.    No metal jewelry, buttons, or zippers to the upper body.  Patient verbalizes understanding of instructions.

## 2023-03-20 NOTE — PROGRESS NOTES
Subjective:      Patient ID: Narciso Mckeon is a 57 y.o. male.    Chief Complaint:    Thyroid Nodule     Patient is a 57 yr old gentleman with history of post treatment Graves disease seen in [facility] today    History of Present Illness    The patient, Mr Mckeon is a 57 yr old gentleman with history of Graves disease with hyperthyroidism seen in [facility] today. He had been managed with long term methimazole therapy and had previously been seen on this account with Dr Geovanni Watson and Dr Huerta. He is presently of this medication though and his last TFTs confirmed biochemical euthyroid state.  His  thyroid USS from 05/19 showed stable thyroid nodular disease presently not at the threshold for biopsy.  His latest  thyroid USS  from  05/21 showed sonographic stability of the thyroid nodules and thus his next USS should be for ~ 05/23 as he has had serial sonographic surveillance with stability for ~ 5 yrs prior..  Patient has a background epworth score of 9. Patient has polysomogram done ~ 2 yrs ago and   Showed  RLS with loud snoring but no apnea.  He continues to have intermittent neck tightness but no dysphagia nor dyspnea.  No persistent palpitations. He continues to have intermittent excessive tearing in the right eye.  Patient sees Dr Freire on this account.  Patient has been of methimazole now for ~ 2 yrs.  There is no family history of thyroid disease.   Patient was born and raised in [facility]. Patient has no history of residence outside the country.  Patients does does not contain excessive amounts of sea food, cabbage or soy products.  Patient stopped smoking in 2014 but had smoked for 20 yrs before.  Patient drinks ~ 3 beers /week.     The 10-year ASCVD risk score (Joyce JUANITA Jr., et al., 2013) is: 3.7%    Values used to calculate the score:      Age: 52 years      Sex: Male      Is Non- : No      Diabetic: No      Tobacco smoker: No      Systolic Blood Pressure: 117 mmHg      Is  BP treated: No      HDL Cholesterol: 50 mg/dL      Total Cholesterol: 189 mg/dL      Patients  fibroscan from 05/19 showed F0 and stage 2 hepatic steatosis.  Patients latest fibroscan below (from 01/21);    Findings  Median liver stiffness score:  6.3  CAP Reading: dB/m:  256     IQR/med %:  17  Interpretation  Fibrosis interpretation is based on medial liver stiffness - Kilopascal (kPa).     Fibrosis Stage:  F 0-1  Steatosis interpretation is based on controlled attenuation parameter - (dB/m).     Steatosis Grade:  S1  This shows significant interval improvement in his prior fatty liver.    Patient has no fresh complaints today.   Patient has gained ~ 13 lbs since the start of 2021 but has been doing a lot of resistance exercise with increased muscle mass.    Patient had been screened for hemochromotasis in the past (2016) and that was -ve.  His most recent hepatic USS from 02/23 showed no evidence of hepatic steatosis at this point. His most recent hepatic fibroscan from 02/23 also showed nil fibrosis of note (F0-F1 and Steatosis grade of <S1).  Her has some occasional issues with swallowing when he tries to gulp down fluids rapidly but is able to swallow without difficult when he slows down. No odynophagia nor dysphagia.  Patient has recently over the last year now having scalp hair loss, mood swings and reduced libido with chronic fatigue.    Patient has two senior brothers one of who passed in his 50s. No known history of low testosterone in his father.   He has not had any major head injuries. He does have bilateral hydrocoeles and a large epididymal cyst. He has never been on high dose steroids nor narcotic for long period of time.  Patient has no fertility intention at this point.          Review of Systems   Constitutional:  Negative for chills, diaphoresis, fatigue and unexpected weight change.   HENT:  Negative for facial swelling, hearing loss, trouble swallowing and voice change.    Eyes:  Negative for  "photophobia and visual disturbance.   Respiratory:  Negative for cough and shortness of breath.    Cardiovascular:  Negative for chest pain, palpitations and leg swelling.   Gastrointestinal:  Negative for nausea and vomiting.   Endocrine: Negative for polyphagia and polyuria.   Genitourinary:  Negative for difficulty urinating and dysuria.   Musculoskeletal:  Positive for arthralgias (mainly in feet) and back pain (chronic and stable).   Skin:  Negative for color change, pallor and rash.   Neurological:  Negative for headaches.   Hematological:  Does not bruise/bleed easily.   Psychiatric/Behavioral:  Negative for confusion and sleep disturbance. The patient is not nervous/anxious.      Objective:     BP (!) 148/88 (BP Location: Left arm, Patient Position: Sitting, BP Method: Large (Manual))   Pulse 94   Temp 97.9 °F (36.6 °C) (Oral)   Ht 5' 10" (1.778 m)   Wt 103.7 kg (228 lb 9.9 oz)   SpO2 97%   BMI 32.80 kg/m²   Body surface area is 2.26 meters squared. He has a 7lb deficit over the last year.         Physical Exam  Vitals reviewed.   Constitutional:       General: He is not in acute distress.     Appearance: He is well-developed. He is not toxic-appearing or diaphoretic.      Comments: Pleasant middle aged gentleman. Not pale, anicteric and afebrile. Well hydrated and not in any acute distress.Patient is in much better mood today that at last visit and does appear noticeably more muscular.   HENT:      Head: Normocephalic and atraumatic. No right periorbital erythema or left periorbital erythema. Hair is normal.      Mouth/Throat:      Pharynx: No oropharyngeal exudate.   Eyes:      General: Lids are normal. Lids are everted, no foreign bodies appreciated. No scleral icterus.     Conjunctiva/sclera: Conjunctivae normal.      Right eye: Right conjunctiva is not injected.      Left eye: Left conjunctiva is not injected.      Pupils: Pupils are equal, round, and reactive to light.      Comments: No proptosis " and no diplopia   Neck:      Thyroid: No thyroid mass or thyromegaly.      Vascular: No carotid bruit or JVD.      Trachea: Trachea and phonation normal. No tracheal tenderness or tracheal deviation.   Cardiovascular:      Rate and Rhythm: Normal rate and regular rhythm.      Pulses: Normal pulses.      Heart sounds: Normal heart sounds. No murmur heard.    No gallop.   Pulmonary:      Effort: Pulmonary effort is normal. No respiratory distress.      Breath sounds: Normal breath sounds. No stridor. No decreased breath sounds, wheezing, rhonchi or rales.   Abdominal:      General: Bowel sounds are normal. There is no distension.      Palpations: Abdomen is soft. There is no mass.      Tenderness: There is no abdominal tenderness.   Musculoskeletal:         General: No swelling or tenderness. Normal range of motion.      Cervical back: Full passive range of motion without pain, normal range of motion and neck supple.      Comments: No pedal edema. No digital clubbing nor thyroid acropachy. No pretibial myxedema but has fine tremor of outstretched hands   Lymphadenopathy:      Cervical: No cervical adenopathy.   Skin:     General: Skin is warm and dry.      Coloration: Skin is not jaundiced or pale.      Findings: No bruising, ecchymosis, erythema, petechiae or rash.      Nails: There is no clubbing.      Comments: Diffuse xerosis   Neurological:      General: No focal deficit present.      Mental Status: He is alert and oriented to person, place, and time.      Cranial Nerves: No cranial nerve deficit.      Sensory: No sensory deficit.      Motor: No tremor or abnormal muscle tone.      Gait: Gait normal.      Deep Tendon Reflexes: Reflexes are normal and symmetric. Reflexes normal.   Psychiatric:         Mood and Affect: Mood is not anxious or depressed.         Speech: Speech normal.         Behavior: Behavior normal.         Thought Content: Thought content normal.         Judgment: Judgment normal.       Lab  Review:        Latest Reference Range & Units 01/25/22 15:10 08/26/22 14:50 10/05/22 17:36 11/04/22 09:00 02/14/23 08:36   WBC 3.90 - 12.70 K/uL 6.28   5.40 4.93   RBC 4.60 - 6.20 M/uL 4.58 (L)   4.00 (L) 4.36 (L)   Hemoglobin 14.0 - 18.0 g/dL 15.7   14.1 15.0   Hematocrit 40.0 - 54.0 % 46.1   40.3 43.3   MCV 82 - 98 fL 101 (H)   101 (H) 99 (H)   MCH 27.0 - 31.0 pg 34.3 (H)   35.3 (H) 34.4 (H)   MCHC 32.0 - 36.0 g/dL 34.1   35.0 34.6   RDW 11.5 - 14.5 % 12.8   13.2 12.7   Platelets 150 - 450 K/uL 228   164 179   MPV 9.2 - 12.9 fL 9.7   9.3 9.3   Gran % 38.0 - 73.0 % 55.1    55.7   Lymph % 18.0 - 48.0 % 34.2    31.2   Mono % 4.0 - 15.0 % 8.4    9.5   Eosinophil % 0.0 - 8.0 % 1.6    2.4   Basophil % 0.0 - 1.9 % 0.5    1.0   Immature Granulocytes 0.0 - 0.5 % 0.2    0.2   Gran # (ANC) 1.8 - 7.7 K/uL 3.5    2.7   Lymph # 1.0 - 4.8 K/uL 2.2    1.5   Mono # 0.3 - 1.0 K/uL 0.5    0.5   Eos # 0.0 - 0.5 K/uL 0.1    0.1   Baso # 0.00 - 0.20 K/uL 0.03    0.05   Immature Grans (Abs) 0.00 - 0.04 K/uL 0.01    0.01   nRBC 0 /100 WBC 0    0   Differential Method  Automated    Automated   Iron 45 - 160 ug/dL  167 (H)      TIBC 250 - 450 ug/dL  380      Saturated Iron 20 - 50 %  44      Transferrin 200 - 375 mg/dL  200 - 375 mg/dL  257  257      Ferritin 20.0 - 300.0 ng/mL 450 (H) 320 (H)      Sodium 136 - 145 mmol/L 135 (L) 141   140   Potassium 3.5 - 5.1 mmol/L 4.2 4.4   4.5   Chloride 95 - 110 mmol/L 105 106   108   CO2 23 - 29 mmol/L 22 (L) 25   23   Anion Gap 8 - 16 mmol/L 8 10   9   BUN 6 - 20 mg/dL 13 18   20   Creatinine 0.5 - 1.4 mg/dL 0.9 1.1   1.1   eGFR >60 mL/min/1.73 m^2  >60   >60   eGFR if non African American >60 mL/min/1.73 m^2 >60.0       eGFR if African American >60 mL/min/1.73 m^2 >60.0       Glucose 70 - 110 mg/dL 91 92   122 (H)   Calcium 8.7 - 10.5 mg/dL 9.9 9.3   9.3   Ionized Calcium 1.06 - 1.42 mmol/L  1.26      Alkaline Phosphatase 55 - 135 U/L 119 95   75   PROTEIN TOTAL 6.0 - 8.4 g/dL 7.9 7.3   7.3    Albumin 3.5 - 5.2 g/dL 4.4 4.2   4.3   Uric Acid 3.4 - 7.0 mg/dL 6.9       BILIRUBIN TOTAL 0.1 - 1.0 mg/dL 1.0 1.3 (H)   0.8   AST 10 - 40 U/L 31 26   21   ALT 10 - 44 U/L 55 (H) 41   29   GGT 8 - 55 U/L 402 (H) 235 (H)      Ammonia 10 - 50 umol/L 67 (H) 26      Cholesterol 120 - 199 mg/dL 182       HDL 40 - 75 mg/dL 51       HDL/Cholesterol Ratio 20.0 - 50.0 % 28.0       LDL Cholesterol External 63.0 - 159.0 mg/dL 88.8       Non-HDL Cholesterol mg/dL 131       Total Cholesterol/HDL Ratio 2.0 - 5.0  3.6       Triglycerides 30 - 150 mg/dL 211 (H)       Lactate, Saul 0.5 - 2.2 mmol/L 0.8       Vit D, 25-Hydroxy 30 - 96 ng/mL  55      Hemoglobin A1C External 4.0 - 5.6 % 4.6       Estimated Avg Glucose 68 - 131 mg/dL 85       TSH 0.400 - 4.000 uIU/mL 1.436 1.008      T3, Total 60 - 180 ng/dL  120      Free T4 0.71 - 1.51 ng/dL  0.94      Thyroglobulin Interpretation   SEE BELOW      Thyroglobulin Antibody Screen <1.8 IU/mL  22 (H)      Thyroglobulin, Tumor Marker ng/mL  0.6 (H)      PTH 9.0 - 77.0 pg/mL  31.2      AFP 0.0 - 8.4 ng/mL 5.8       PSA Total 0.00 - 4.00 ng/mL 2.0       PSA, Free 0.00 - 1.50 ng/mL 0.35       PSA, Free % Not established % 17.50       Testosterone, Total 304 - 1227 ng/dL    363    RAPID STREP A SCREEN Negative    Negative     (L): Data is abnormally low  (H): Data is abnormally high    Assessment:     1. Nodular thyroid disease  US Soft Tissue Head Neck Thyroid      2. Hashimoto's disease        3. History of thyrotoxicosis        4. History of Graves' disease        5. Abnormal thyroid blood test        6. Goiter  US Soft Tissue Head Neck Thyroid      7. Dysmetabolic syndrome        8. Hypovitaminosis D        9. Hx of colonic polyps        10. Mixed hyperlipidemia        11. Hypercholesterolemia        12. Restless leg syndrome        13. Obesity (BMI 30-39.9)               Regarding Graves disease; appears to be clinical quiescent at the moment as far as thyroid activity. Will recheck full  TFTs and thyroid antibody profile.  Regarding thyroid nodular disease; to obtain ffup thyroid USS ~ 05/2023.  Regarding possible opthalmopathy; to continue formal opthalmic referral to evaluate for this.  Depending on the current status as regards antibody titers and presence or absence of orbitopathy may decide on permanent treatment for Graves.  His hx of NAFLD essentially exclude methimazole or PTU use as an option and his recent history of long term smoking makes radio iodine ablation a less than optimal option. Elective thyroidectomy would be the preferred management option for him and I have informed him as much but will await results of latest labs as detailed above.  Regarding NAFLD; Now appears resolved. He has been discharged from the hepatology service though he does have some residual splenomegaly.  Regarding hyperlipidemia; to continue antilipidemic therapy as before.  Regarding chronic fatigue and emotional lability; to obtain androgen profile screening and if consistent with hypogonadism will commence testosterone repletion therapy.  Regarding epididymal cyst and hydrocoeles; ongoing serial surveillance with his Urology team.        Plan:     FFup in ~ 6mths

## 2023-03-21 ENCOUNTER — HOSPITAL ENCOUNTER (OUTPATIENT)
Dept: CARDIOLOGY | Facility: HOSPITAL | Age: 58
Discharge: HOME | End: 2023-03-21
Attending: INTERNAL MEDICINE
Payer: COMMERCIAL

## 2023-03-21 ENCOUNTER — HOSPITAL ENCOUNTER (OUTPATIENT)
Dept: RADIOLOGY | Facility: HOSPITAL | Age: 58
Discharge: HOME | End: 2023-03-21
Attending: INTERNAL MEDICINE
Payer: COMMERCIAL

## 2023-03-21 VITALS — HEIGHT: 70 IN | WEIGHT: 229 LBS | BODY MASS INDEX: 32.78 KG/M2

## 2023-03-21 DIAGNOSIS — E05.00 GRAVES DISEASE: ICD-10-CM

## 2023-03-21 DIAGNOSIS — I10 ESSENTIAL HYPERTENSION: ICD-10-CM

## 2023-03-21 DIAGNOSIS — R07.89 OTHER CHEST PAIN: ICD-10-CM

## 2023-03-21 DIAGNOSIS — E03.9 ACQUIRED HYPOTHYROIDISM: ICD-10-CM

## 2023-03-21 DIAGNOSIS — E78.2 MIXED HYPERLIPIDEMIA: ICD-10-CM

## 2023-03-21 DIAGNOSIS — R07.89 OTHER CHEST PAIN: Primary | ICD-10-CM

## 2023-03-21 LAB
25(OH)D3+25(OH)D2 SERPL-MCNC: 43 NG/ML (ref 30–96)
ESTRADIOL SERPL-MCNC: 14 PG/ML (ref 11–44)
FSH SERPL-ACNC: 10.57 MIU/ML (ref 0.95–11.95)
LH SERPL-ACNC: 4 MIU/ML (ref 0.6–12.1)
PROSTATE SPECIFIC ANTIGEN, TOTAL: 2.1 NG/ML (ref 0–4)
PSA FREE MFR SERPL: 19.52 %
PSA FREE SERPL-MCNC: 0.41 NG/ML (ref 0–1.5)
T3 SERPL-MCNC: 118 NG/DL (ref 60–180)
T4 FREE SERPL-MCNC: 1.03 NG/DL (ref 0.71–1.51)
TSH SERPL DL<=0.005 MIU/L-ACNC: 1.29 UIU/ML (ref 0.4–4)

## 2023-03-21 PROCEDURE — 93306 ECHO (CUPID ONLY): ICD-10-PCS | Mod: 26,,, | Performed by: INTERNAL MEDICINE

## 2023-03-21 PROCEDURE — 93018 NUCLEAR STRESS - CARDIOLOGY INTERPRETED (CUPID ONLY): ICD-10-PCS | Mod: ,,, | Performed by: INTERNAL MEDICINE

## 2023-03-21 PROCEDURE — 93016 NUCLEAR STRESS - CARDIOLOGY INTERPRETED (CUPID ONLY): ICD-10-PCS | Mod: ,,, | Performed by: NURSE PRACTITIONER

## 2023-03-21 PROCEDURE — 93017 CV STRESS TEST TRACING ONLY: CPT

## 2023-03-21 PROCEDURE — 93016 CV STRESS TEST SUPVJ ONLY: CPT | Mod: ,,, | Performed by: NURSE PRACTITIONER

## 2023-03-21 PROCEDURE — 78452 NUCLEAR STRESS - CARDIOLOGY INTERPRETED (CUPID ONLY): ICD-10-PCS | Mod: 26,,, | Performed by: INTERNAL MEDICINE

## 2023-03-21 PROCEDURE — 78452 HT MUSCLE IMAGE SPECT MULT: CPT | Mod: 26,,, | Performed by: INTERNAL MEDICINE

## 2023-03-21 PROCEDURE — 93306 TTE W/DOPPLER COMPLETE: CPT | Mod: 26,,, | Performed by: INTERNAL MEDICINE

## 2023-03-21 PROCEDURE — 93306 TTE W/DOPPLER COMPLETE: CPT

## 2023-03-21 PROCEDURE — 93018 CV STRESS TEST I&R ONLY: CPT | Mod: ,,, | Performed by: INTERNAL MEDICINE

## 2023-03-21 PROCEDURE — A9502 TC99M TETROFOSMIN: HCPCS

## 2023-03-22 LAB
THRYOGLOBULIN INTERPRETATION: ABNORMAL
THYROGLOB AB SERPL-ACNC: 15 IU/ML
THYROGLOB SERPL-MCNC: 1.7 NG/ML

## 2023-03-23 LAB
AORTIC ROOT ANNULUS: 3.7 CM
AORTIC VALVE CUSP SEPERATION: 2.2 CM
AV INDEX (PROSTH): 0.87
AV MEAN GRADIENT: 4 MMHG
AV PEAK GRADIENT: 8 MMHG
AV VALVE AREA: 3.02 CM2
AV VELOCITY RATIO: 0.91
BSA FOR ECHO PROCEDURE: 2.26 M2
CV ECHO LV RWT: 0.62 CM
DOP CALC AO PEAK VEL: 1.4 M/S
DOP CALC AO VTI: 28.7 CM
DOP CALC LVOT AREA: 3.5 CM2
DOP CALC LVOT DIAMETER: 2.1 CM
DOP CALC LVOT PEAK VEL: 1.27 M/S
DOP CALC LVOT STROKE VOLUME: 86.55 CM3
DOP CALCLVOT PEAK VEL VTI: 25 CM
E WAVE DECELERATION TIME: 174 MSEC
E/A RATIO: 1.03
E/E' RATIO: 8.1 M/S
ECHO LV POSTERIOR WALL: 1.36 CM (ref 0.6–1.1)
EJECTION FRACTION: 65 %
ESTROGEN SERPL-MCNC: 141 PG/ML
FRACTIONAL SHORTENING: 31 % (ref 28–44)
INTERVENTRICULAR SEPTUM: 0.86 CM (ref 0.6–1.1)
IVRT: 106 MSEC
LEFT ATRIUM SIZE: 3.7 CM
LEFT INTERNAL DIMENSION IN SYSTOLE: 3.03 CM (ref 2.1–4)
LEFT VENTRICLE DIASTOLIC VOLUME INDEX: 38.82 ML/M2
LEFT VENTRICLE DIASTOLIC VOLUME: 85.8 ML
LEFT VENTRICLE MASS INDEX: 76 G/M2
LEFT VENTRICLE SYSTOLIC VOLUME INDEX: 16.2 ML/M2
LEFT VENTRICLE SYSTOLIC VOLUME: 35.9 ML
LEFT VENTRICULAR INTERNAL DIMENSION IN DIASTOLE: 4.36 CM (ref 3.5–6)
LEFT VENTRICULAR MASS: 168.67 G
LV LATERAL E/E' RATIO: 7.36 M/S
LV SEPTAL E/E' RATIO: 9 M/S
LVOT MG: 3 MMHG
LVOT MV: 0.75 CM/S
MV PEAK A VEL: 0.79 M/S
MV PEAK E VEL: 0.81 M/S
MV STENOSIS PRESSURE HALF TIME: 54 MS
MV VALVE AREA P 1/2 METHOD: 4.07 CM2
PISA TR MAX VEL: 2.04 M/S
RIGHT VENTRICULAR END-DIASTOLIC DIMENSION: 2.61 CM
TDI LATERAL: 0.11 M/S
TDI SEPTAL: 0.09 M/S
TDI: 0.1 M/S
TR MAX PG: 17 MMHG

## 2023-03-24 ENCOUNTER — OFFICE VISIT (OUTPATIENT)
Dept: OPHTHALMOLOGY | Facility: CLINIC | Age: 58
End: 2023-03-24
Payer: COMMERCIAL

## 2023-03-24 DIAGNOSIS — H02.88A MEIBOMIAN GLAND DYSFUNCTION (MGD), BILATERAL, BOTH UPPER AND LOWER LIDS: ICD-10-CM

## 2023-03-24 DIAGNOSIS — H02.88B MEIBOMIAN GLAND DYSFUNCTION (MGD), BILATERAL, BOTH UPPER AND LOWER LIDS: ICD-10-CM

## 2023-03-24 DIAGNOSIS — H40.033 ANATOMICAL NARROW ANGLE, BILATERAL: Primary | ICD-10-CM

## 2023-03-24 DIAGNOSIS — H47.391 MYELINATED OPTIC NERVE FIBER LAYER, RIGHT: ICD-10-CM

## 2023-03-24 LAB
CV STRESS BASE HR: 145 BPM
DIASTOLIC BLOOD PRESSURE: 86 MMHG
EJECTION FRACTION- HIGH: 65 %
END DIASTOLIC INDEX-HIGH: 153 ML/M2
END DIASTOLIC INDEX-LOW: 93 ML/M2
END SYSTOLIC INDEX-HIGH: 71 ML/M2
END SYSTOLIC INDEX-LOW: 31 ML/M2
NUC REST DIASTOLIC VOLUME INDEX: 100
NUC REST EJECTION FRACTION: 68
NUC REST SYSTOLIC VOLUME INDEX: 32
NUC STRESS DIASTOLIC VOLUME INDEX: 102
NUC STRESS EJECTION FRACTION: 73 %
NUC STRESS SYSTOLIC VOLUME INDEX: 28
OHS CV CPX 1 MINUTE RECOVERY HEART RATE: 117 BPM
OHS CV CPX 85 PERCENT MAX PREDICTED HEART RATE MALE: 138
OHS CV CPX ESTIMATED METS: 7
OHS CV CPX MAX PREDICTED HEART RATE: 162
OHS CV CPX PATIENT IS FEMALE: 0
OHS CV CPX PATIENT IS MALE: 1
OHS CV CPX PEAK DIASTOLIC BLOOD PRESSURE: 88 MMHG
OHS CV CPX PEAK HEAR RATE: 145 BPM
OHS CV CPX PEAK RATE PRESSURE PRODUCT: NORMAL
OHS CV CPX PEAK SYSTOLIC BLOOD PRESSURE: 176 MMHG
OHS CV CPX PERCENT MAX PREDICTED HEART RATE ACHIEVED: 90
OHS CV CPX RATE PRESSURE PRODUCT PRESENTING: NORMAL
RETIRED EF AND QEF - SEE NOTES: 53 %
STRESS ECHO POST EXERCISE DUR MIN: 5 MINUTES
STRESS ECHO POST EXERCISE DUR SEC: 33 SECONDS
SYSTOLIC BLOOD PRESSURE: 126 MMHG

## 2023-03-24 PROCEDURE — 1159F MED LIST DOCD IN RCRD: CPT | Mod: CPTII,S$GLB,, | Performed by: OPHTHALMOLOGY

## 2023-03-24 PROCEDURE — 92020 PR SPECIAL EYE EVAL,GONIOSCOPY: ICD-10-PCS | Mod: S$GLB,,, | Performed by: OPHTHALMOLOGY

## 2023-03-24 PROCEDURE — 99999 PR PBB SHADOW E&M-EST. PATIENT-LVL III: ICD-10-PCS | Mod: PBBFAC,,, | Performed by: OPHTHALMOLOGY

## 2023-03-24 PROCEDURE — 1160F RVW MEDS BY RX/DR IN RCRD: CPT | Mod: CPTII,S$GLB,, | Performed by: OPHTHALMOLOGY

## 2023-03-24 PROCEDURE — 1160F PR REVIEW ALL MEDS BY PRESCRIBER/CLIN PHARMACIST DOCUMENTED: ICD-10-PCS | Mod: CPTII,S$GLB,, | Performed by: OPHTHALMOLOGY

## 2023-03-24 PROCEDURE — 1159F PR MEDICATION LIST DOCUMENTED IN MEDICAL RECORD: ICD-10-PCS | Mod: CPTII,S$GLB,, | Performed by: OPHTHALMOLOGY

## 2023-03-24 PROCEDURE — 99999 PR PBB SHADOW E&M-EST. PATIENT-LVL III: CPT | Mod: PBBFAC,,, | Performed by: OPHTHALMOLOGY

## 2023-03-24 PROCEDURE — 92014 PR EYE EXAM, EST PATIENT,COMPREHESV: ICD-10-PCS | Mod: S$GLB,,, | Performed by: OPHTHALMOLOGY

## 2023-03-24 PROCEDURE — 3066F PR DOCUMENTATION OF TREATMENT FOR NEPHROPATHY: ICD-10-PCS | Mod: CPTII,S$GLB,, | Performed by: OPHTHALMOLOGY

## 2023-03-24 PROCEDURE — 3044F HG A1C LEVEL LT 7.0%: CPT | Mod: CPTII,S$GLB,, | Performed by: OPHTHALMOLOGY

## 2023-03-24 PROCEDURE — 92014 COMPRE OPH EXAM EST PT 1/>: CPT | Mod: S$GLB,,, | Performed by: OPHTHALMOLOGY

## 2023-03-24 PROCEDURE — 3066F NEPHROPATHY DOC TX: CPT | Mod: CPTII,S$GLB,, | Performed by: OPHTHALMOLOGY

## 2023-03-24 PROCEDURE — 92020 GONIOSCOPY: CPT | Mod: S$GLB,,, | Performed by: OPHTHALMOLOGY

## 2023-03-24 PROCEDURE — 3061F PR NEG MICROALBUMINURIA RESULT DOCUMENTED/REVIEW: ICD-10-PCS | Mod: CPTII,S$GLB,, | Performed by: OPHTHALMOLOGY

## 2023-03-24 PROCEDURE — 3061F NEG MICROALBUMINURIA REV: CPT | Mod: CPTII,S$GLB,, | Performed by: OPHTHALMOLOGY

## 2023-03-24 PROCEDURE — 3044F PR MOST RECENT HEMOGLOBIN A1C LEVEL <7.0%: ICD-10-PCS | Mod: CPTII,S$GLB,, | Performed by: OPHTHALMOLOGY

## 2023-03-24 NOTE — PROGRESS NOTES
HPI    DLS: 3/29/2021- Gonio     Pt states when looking at phone and then back out to distance eyes are a   little blurry. Takes a minute for them to focus.     No gtts.     Denies pain/ FOL. No new floaters.   Last edited by Jacqueline Malik on 3/24/2023 12:41 PM.            Assessment /Plan     For exam results, see Encounter Report.    Anatomical narrow angle, bilateral    Meibomian gland dysfunction (MGD), bilateral, both upper and lower lids    Myelinated optic nerve fiber layer, right      1. Anatomical narrow angle, bilateral  Borderline angles, steep insertion but no PAS.  Healthy ONHs and normal IOP.      Low risk of AACG explained.    F/u 1 year, repeat gonio    2. Meibomian gland dysfunction (MGD), bilateral, both upper and lower lids  Eyelid hygeine    3. Myelinated optic nerve fiber layer, right  Observe

## 2023-03-27 DIAGNOSIS — R79.89 LOW TESTOSTERONE IN MALE: Primary | ICD-10-CM

## 2023-03-27 LAB
ALBUMIN SERPL-MCNC: 4.7 G/DL (ref 3.6–5.1)
ANDROSTANOLONE SERPL-MCNC: 672 PG/ML (ref 112–955)
SHBG SERPL-SCNC: 84 NMOL/L (ref 22–77)
TESTOST FREE SERPL-MCNC: 39.9 PG/ML (ref 46–224)
TESTOST SERPL-MCNC: 674 NG/DL (ref 250–1100)
TESTOSTERONE.FREE+WB SERPL-MCNC: 85.5 NG/DL (ref 110–575)

## 2023-03-27 RX ORDER — CLOMIPHENE CITRATE 50 MG/1
50 TABLET ORAL DAILY
Qty: 90 TABLET | Refills: 3 | Status: SHIPPED | OUTPATIENT
Start: 2023-03-27 | End: 2023-03-30 | Stop reason: SDUPTHER

## 2023-03-30 DIAGNOSIS — R79.89 LOW TESTOSTERONE IN MALE: ICD-10-CM

## 2023-03-30 RX ORDER — CLOMIPHENE CITRATE 50 MG/1
50 TABLET ORAL DAILY
Qty: 90 TABLET | Refills: 3 | Status: SHIPPED | OUTPATIENT
Start: 2023-03-30 | End: 2023-07-26

## 2023-03-30 NOTE — TELEPHONE ENCOUNTER
----- Message from Cruz Plascencia sent at 3/30/2023 10:05 AM CDT -----  Regarding: refill  Contact: [facility], [place]  [facility]armacy requesting CLOMID) 50 mg  to be transferred from [facility] to [facility], any questions please call back at [phone] (home)       [facility] [place] - [address] [facility] [place] [address]  Phone: [phone] Fax: [phone]    [facility]  [address] [place] [address]  Phone Number [phone]

## 2023-04-04 ENCOUNTER — HOSPITAL ENCOUNTER (OUTPATIENT)
Dept: RADIOLOGY | Facility: HOSPITAL | Age: 58
Discharge: HOME | End: 2023-04-04
Attending: INTERNAL MEDICINE
Payer: COMMERCIAL

## 2023-04-04 DIAGNOSIS — R79.89 LOW TESTOSTERONE IN MALE: ICD-10-CM

## 2023-04-04 PROCEDURE — 76870 US SCROTUM AND TESTICLES: ICD-10-PCS | Mod: 26,,, | Performed by: RADIOLOGY

## 2023-04-04 PROCEDURE — 76870 US EXAM SCROTUM: CPT | Mod: 26,,, | Performed by: RADIOLOGY

## 2023-04-04 PROCEDURE — 76870 US EXAM SCROTUM: CPT | Mod: TC

## 2023-04-12 ENCOUNTER — OFFICE VISIT (OUTPATIENT)
Dept: CARDIOLOGY | Facility: CLINIC | Age: 58
End: 2023-04-12
Payer: COMMERCIAL

## 2023-04-12 VITALS
WEIGHT: 232 LBS | BODY MASS INDEX: 33.21 KG/M2 | SYSTOLIC BLOOD PRESSURE: 140 MMHG | RESPIRATION RATE: 16 BRPM | HEIGHT: 70 IN | OXYGEN SATURATION: 98 % | DIASTOLIC BLOOD PRESSURE: 80 MMHG | HEART RATE: 72 BPM

## 2023-04-12 DIAGNOSIS — R07.89 OTHER CHEST PAIN: Primary | ICD-10-CM

## 2023-04-12 DIAGNOSIS — E78.2 MIXED HYPERLIPIDEMIA: ICD-10-CM

## 2023-04-12 DIAGNOSIS — I10 ESSENTIAL HYPERTENSION: ICD-10-CM

## 2023-04-12 DIAGNOSIS — E05.00 GRAVES DISEASE: ICD-10-CM

## 2023-04-12 DIAGNOSIS — E03.9 ACQUIRED HYPOTHYROIDISM: ICD-10-CM

## 2023-04-12 PROCEDURE — 99214 OFFICE O/P EST MOD 30 MIN: CPT | Mod: S$GLB,,, | Performed by: INTERNAL MEDICINE

## 2023-04-12 PROCEDURE — 1159F MED LIST DOCD IN RCRD: CPT | Mod: CPTII,S$GLB,, | Performed by: INTERNAL MEDICINE

## 2023-04-12 PROCEDURE — 99214 PR OFFICE/OUTPT VISIT, EST, LEVL IV, 30-39 MIN: ICD-10-PCS | Mod: S$GLB,,, | Performed by: INTERNAL MEDICINE

## 2023-04-12 PROCEDURE — 3066F PR DOCUMENTATION OF TREATMENT FOR NEPHROPATHY: ICD-10-PCS | Mod: CPTII,S$GLB,, | Performed by: INTERNAL MEDICINE

## 2023-04-12 PROCEDURE — 99999 PR PBB SHADOW E&M-EST. PATIENT-LVL IV: CPT | Mod: PBBFAC,,, | Performed by: INTERNAL MEDICINE

## 2023-04-12 PROCEDURE — 3079F DIAST BP 80-89 MM HG: CPT | Mod: CPTII,S$GLB,, | Performed by: INTERNAL MEDICINE

## 2023-04-12 PROCEDURE — 3008F PR BODY MASS INDEX (BMI) DOCUMENTED: ICD-10-PCS | Mod: CPTII,S$GLB,, | Performed by: INTERNAL MEDICINE

## 2023-04-12 PROCEDURE — 3008F BODY MASS INDEX DOCD: CPT | Mod: CPTII,S$GLB,, | Performed by: INTERNAL MEDICINE

## 2023-04-12 PROCEDURE — 3079F PR MOST RECENT DIASTOLIC BLOOD PRESSURE 80-89 MM HG: ICD-10-PCS | Mod: CPTII,S$GLB,, | Performed by: INTERNAL MEDICINE

## 2023-04-12 PROCEDURE — 1159F PR MEDICATION LIST DOCUMENTED IN MEDICAL RECORD: ICD-10-PCS | Mod: CPTII,S$GLB,, | Performed by: INTERNAL MEDICINE

## 2023-04-12 PROCEDURE — 1160F PR REVIEW ALL MEDS BY PRESCRIBER/CLIN PHARMACIST DOCUMENTED: ICD-10-PCS | Mod: CPTII,S$GLB,, | Performed by: INTERNAL MEDICINE

## 2023-04-12 PROCEDURE — 3066F NEPHROPATHY DOC TX: CPT | Mod: CPTII,S$GLB,, | Performed by: INTERNAL MEDICINE

## 2023-04-12 PROCEDURE — 3044F PR MOST RECENT HEMOGLOBIN A1C LEVEL <7.0%: ICD-10-PCS | Mod: CPTII,S$GLB,, | Performed by: INTERNAL MEDICINE

## 2023-04-12 PROCEDURE — 99999 PR PBB SHADOW E&M-EST. PATIENT-LVL IV: ICD-10-PCS | Mod: PBBFAC,,, | Performed by: INTERNAL MEDICINE

## 2023-04-12 PROCEDURE — 3077F PR MOST RECENT SYSTOLIC BLOOD PRESSURE >= 140 MM HG: ICD-10-PCS | Mod: CPTII,S$GLB,, | Performed by: INTERNAL MEDICINE

## 2023-04-12 PROCEDURE — 3077F SYST BP >= 140 MM HG: CPT | Mod: CPTII,S$GLB,, | Performed by: INTERNAL MEDICINE

## 2023-04-12 PROCEDURE — 3061F NEG MICROALBUMINURIA REV: CPT | Mod: CPTII,S$GLB,, | Performed by: INTERNAL MEDICINE

## 2023-04-12 PROCEDURE — 3061F PR NEG MICROALBUMINURIA RESULT DOCUMENTED/REVIEW: ICD-10-PCS | Mod: CPTII,S$GLB,, | Performed by: INTERNAL MEDICINE

## 2023-04-12 PROCEDURE — 3044F HG A1C LEVEL LT 7.0%: CPT | Mod: CPTII,S$GLB,, | Performed by: INTERNAL MEDICINE

## 2023-04-12 PROCEDURE — 1160F RVW MEDS BY RX/DR IN RCRD: CPT | Mod: CPTII,S$GLB,, | Performed by: INTERNAL MEDICINE

## 2023-04-12 NOTE — PROGRESS NOTES
Subjective:    Patient ID:  Narciso Mckeon is a 57 y.o. male     Chief Complaint   Patient presents with    Results       HPI:  Mr Narciso Mckeon is a 57 y.o. male here for follow-up.    Patient has been doing well no specific complaints at the present time.  His breathing has been good denies any chest pain or tightness or heaviness his denies any shortness of breath or difficulty in breathing denies any palpitations or lightheadedness dizziness or loss of consciousness.        Review of patient's allergies indicates:   Allergen Reactions    Bactrim [sulfamethoxazole-trimethoprim]      Possibly allergic reaction       Past Medical History:   Diagnosis Date    Arthritis     Chronic neck and back pain     DDD (degenerative disc disease), cervical     Essential hypertension 3/21/2023    Graves disease     Hyperthyroidism 2015    Other specified glaucoma 2022     Past Surgical History:   Procedure Laterality Date    COLONOSCOPY N/A 2018    Procedure: COLONOSCOPY;  Surgeon: Keith Arellano MD;  Location: [facility];  Service: Endoscopy;  Laterality: N/A;    FINGER SURGERY      removal of steel    KNEE SURGERY Left 2015    repair     Social History     Tobacco Use    Smoking status: Former     Packs/day: 2.00     Years: 20.00     Pack years: 40.00     Types: Cigarettes     Quit date: 2014     Years since quittin.4    Smokeless tobacco: Never   Substance Use Topics    Alcohol use: Yes     Alcohol/week: 2.0 - 3.0 standard drinks     Types: 2 - 3 Cans of beer per week     Comment: 2 times a week     Drug use: No     Family History   Problem Relation Age of Onset    Heart disease Father 52        MI    Alcohol abuse Father     Macular degeneration Mother     Melanoma Mother     Lupus Neg Hx     Eczema Neg Hx     Psoriasis Neg Hx     Glaucoma Neg Hx     Retinal detachment Neg Hx     Cirrhosis Neg Hx         Review of Systems:   Constitution: Negative for diaphoresis and fever.    HEENT: Negative for nosebleeds.    Cardiovascular: Negative for chest pain       No dyspnea on exertion       No leg swelling        No palpitations  Respiratory: Negative for shortness of breath and wheezing.    Hematologic/Lymphatic: Negative for bleeding problem. Does not bruise/bleed easily.   Skin: Negative for color change and rash.   Musculoskeletal: Negative for falls and myalgias.   Gastrointestinal: Negative for hematemesis and hematochezia.   Genitourinary: Negative for hematuria.   Neurological: Negative for dizziness and light-headedness.   Psychiatric/Behavioral: Negative for altered mental status and memory loss.          Objective:        Vitals:    04/12/23 0830   BP: (!) 140/80   Pulse: 72   Resp: 16       Lab Results   Component Value Date    WBC 4.93 02/14/2023    HGB 15.0 02/14/2023    HCT 43.3 02/14/2023     02/14/2023    CHOL 194 03/20/2023    TRIG 151 (H) 03/20/2023    HDL 60 03/20/2023    ALT 29 02/14/2023    AST 21 02/14/2023     02/14/2023    K 4.5 02/14/2023     02/14/2023    CREATININE 1.1 02/14/2023    BUN 20 02/14/2023    CO2 23 02/14/2023    TSH 1.289 03/20/2023    INR 1.0 07/06/2020    HGBA1C 4.8 03/20/2023        ECHOCARDIOGRAM RESULTS  Results for orders placed during the hospital encounter of 03/21/23    Echo    Interpretation Summary  · The left ventricle is normal in size with concentric remodeling and normal systolic function.  · The estimated ejection fraction is 65%.  · Normal left ventricular diastolic function.  · Normal right ventricular size with normal right ventricular systolic function.        CURRENT/PREVIOUS VISIT EKG  Results for orders placed or performed in visit on 02/20/23   IN OFFICE EKG 12-LEAD (to [employer])    Collection Time: 02/20/23  3:48 PM    Narrative    Test Reason : R07.89,    Vent. Rate : 080 BPM     Atrial Rate : 080 BPM     P-R Int : 164 ms          QRS Dur : 110 ms      QT Int : 372 ms       P-R-T Axes : 034 017 007 degrees      QTc Int : 429 ms    Normal sinus rhythm  Normal ECG  When compared with ECG of 03-OCT-2016 21:01,  No significant change was found  Confirmed by Graeme Christian MD ([id number]) on 2/28/2023 8:42:27 AM    Referred By: ISMAEL   SELF           Confirmed By:Graeme Christian MD     No valid procedures specified.   Results for orders placed during the hospital encounter of 03/21/23    Nuclear Stress - Cardiology Interpreted    Interpretation Summary    Normal myocardial perfusion scan. There is no evidence of myocardial ischemia or infarction.    The gated perfusion images showed an ejection fraction of 68% at rest. The gated perfusion images showed an ejection fraction of 73% post stress. Normal ejection fraction is greater than 53%.    There is normal wall motion at rest and post stress.    LV cavity size is normal at rest and normal at stress.    The ECG portion of the study is negative for ischemia.    The patient reported no chest pain during the stress test.    There were no arrhythmias during stress.    The patient exercised for 5 minutes 33 seconds on a Ari protocol, corresponding to a functional capacity of 7 METS, achieving a peak heart rate of 145 bpm, which is 90 % of the age predicted maximum heart rate.      Physical Exam:  CONSTITUTIONAL: No fever, no chills  HEENT: Normocephalic, atraumatic,pupils reactive to light                 NECK:  No JVD no carotid bruit  CVS: S1S2+, RRR, faint systolic murmurs,   LUNGS: Clear  ABDOMEN: Soft, NT, BS+  EXTREMITIES: No cyanosis, edema  : No rivas catheter  NEURO: AAO X 3  PSY: Normal affect      Medication List with Changes/Refills   Current Medications    ALBUTEROL (VENTOLIN HFA) 90 MCG/ACTUATION INHALER    Inhale 1-2 puffs into the lungs every 6 (six) hours as needed for Wheezing or Shortness of Breath. Rescue    ASPIRIN (ECOTRIN) 81 MG EC TABLET    TAKE ONE TABLET BY MOUTH ONCE DAILY    B COMPLEX VITAMINS TABLET    Take 1 tablet by mouth once daily.    BETAMETHASONE  DIPROPIONATE (DIPROLENE) 0.05 % OINTMENT    AAA hand BID PRN flare    CLOBETASOL (TEMOVATE) 0.05 % CREAM    Thin film to AA hands BID PRN flare    CLOMIPHENE (CLOMID) 50 MG TABLET    Take 1 tablet (50 mg total) by mouth once daily.    GABAPENTIN (NEURONTIN) 300 MG CAPSULE    Take 1 capsule (300 mg total) by mouth 3 (three) times daily.    MECLIZINE (ANTIVERT) 25 MG TABLET    TAKE ONE TABLET BY MOUTH THREE TIMES DAILY AS NEEDED    MELOXICAM (MOBIC) 15 MG TABLET    Take 15 mg by mouth once daily.    MORPHINE (MS CONTIN) 15 MG 12 HR TABLET    Take 15 mg by mouth as needed.    MULTIVITAMIN CAPSULE    Take 1 capsule by mouth once daily.    OXYCODONE (ROXICODONE) 15 MG TAB    Take 15 mg by mouth daily as needed.    ROSUVASTATIN (CRESTOR) 20 MG TABLET    TAKE ONE TABLET BY MOUTH EVERY EVENING    SERTRALINE (ZOLOFT) 50 MG TABLET    Take half tab PO daily x 7 days, then increase to 1 tab PO daily.    TIZANIDINE (ZANAFLEX) 4 MG TABLET    Take 2 mg by mouth every 8 (eight) hours.     VITAMIN E 200 UNIT CAPSULE    Take 200 Units by mouth once daily.             Assessment:       1. Other chest pain    2. Mixed hyperlipidemia    3. Essential hypertension    4. Acquired hypothyroidism    5. Graves disease         Plan:   1. Atypical chest pain.    Patient underwent exercise stress Cardiolite study and essentially is normal no reversible focal perfusion defect.  And normal left ventricle ejection fraction.  2. Essential hypertension   Patient's blood pressure is stable at 1 40/80 he is currently not on any specific blood pressure medications.  Consideration for blood pressure medication.  Patient discussed with his primary physician.  3. Mixed hyperlipidemia  He is currently on rosuvastatin 20 mg p.o. daily continue the same.  And his primary physician is checking his cholesterol and liver function tests.    His blood work showed his total cholesterol at 194 HDL at 60  and triglycerides 151.    4. Echocardiogram   Patient  had normal left ventricle systolic function normal right ventricle systolic function.    5. Patient to continue current management I will see him back in the office in 6 months time.            Problem List Items Addressed This Visit          Cardiac/Vascular    Other chest pain - Primary    Hyperlipidemia    Essential hypertension       Endocrine    Graves disease    Acquired hypothyroidism       No follow-ups on file.

## 2023-04-21 ENCOUNTER — OFFICE VISIT (OUTPATIENT)
Dept: FAMILY MEDICINE | Facility: CLINIC | Age: 58
End: 2023-04-21
Payer: COMMERCIAL

## 2023-04-21 VITALS
WEIGHT: 232 LBS | SYSTOLIC BLOOD PRESSURE: 130 MMHG | HEIGHT: 70 IN | OXYGEN SATURATION: 98 % | RESPIRATION RATE: 18 BRPM | BODY MASS INDEX: 33.21 KG/M2 | HEART RATE: 74 BPM | DIASTOLIC BLOOD PRESSURE: 80 MMHG

## 2023-04-21 DIAGNOSIS — F41.9 ANXIETY: ICD-10-CM

## 2023-04-21 DIAGNOSIS — R91.1 SOLITARY PULMONARY NODULE: ICD-10-CM

## 2023-04-21 DIAGNOSIS — G47.33 OSA (OBSTRUCTIVE SLEEP APNEA): ICD-10-CM

## 2023-04-21 DIAGNOSIS — R79.89 LOW TESTOSTERONE IN MALE: ICD-10-CM

## 2023-04-21 DIAGNOSIS — I70.0 AORTIC ATHEROSCLEROSIS: ICD-10-CM

## 2023-04-21 DIAGNOSIS — G89.29 CHRONIC RIGHT SHOULDER PAIN: ICD-10-CM

## 2023-04-21 DIAGNOSIS — E04.1 NODULAR THYROID DISEASE: ICD-10-CM

## 2023-04-21 DIAGNOSIS — M25.511 CHRONIC RIGHT SHOULDER PAIN: ICD-10-CM

## 2023-04-21 DIAGNOSIS — R53.83 FATIGUE, UNSPECIFIED TYPE: Primary | ICD-10-CM

## 2023-04-21 DIAGNOSIS — K74.00 LIVER FIBROSIS: ICD-10-CM

## 2023-04-21 PROCEDURE — 99215 OFFICE O/P EST HI 40 MIN: CPT | Mod: AQ,S$GLB,, | Performed by: FAMILY MEDICINE

## 2023-04-21 PROCEDURE — 99215 OFFICE O/P EST HI 40 MIN: CPT | Performed by: FAMILY MEDICINE

## 2023-04-21 PROCEDURE — 99215 PR OFFICE/OUTPT VISIT, EST, LEVL V, 40-54 MIN: ICD-10-PCS | Mod: AQ,S$GLB,, | Performed by: FAMILY MEDICINE

## 2023-04-21 RX ORDER — ROSUVASTATIN CALCIUM 20 MG/1
20 TABLET, COATED ORAL NIGHTLY
Qty: 90 TABLET | Refills: 3 | Status: SHIPPED | OUTPATIENT
Start: 2023-04-21

## 2023-04-21 RX ORDER — ASPIRIN 81 MG/1
81 TABLET ORAL DAILY
Qty: 90 TABLET | Refills: 3 | Status: SHIPPED | OUTPATIENT
Start: 2023-04-21

## 2023-04-21 NOTE — PROGRESS NOTES
SUBJECTIVE:    Patient ID: Narciso Mckeon is a 57 y.o. male.    Chief Complaint: Follow-up, Fatigue, and Anxiety    HPI  Narciso Mckeon is a 57 y.o. male with a hx of Aortic atherosclerosis, Hashitmoto's/ Nodular thyroid disease/Hx Graves w Hyperthyroidism, HLD, RLS, NAFLD, Glaucoma, Chronic LBP, and KHOA. He comes in for follow up on Fatigue and Anxiety.    Since last OV w me, he has seen Dr Michelle, who started him on Clomiphene to boost testosterone production as his free T and circulating was low. He has also seen Nakia Pickens, Lexis, and Gino. Available notes reviewed. Regarding his hepatic health, his most recent fibroscans are reported as F0/S0, and plan was to do labs and ultrasound with no need for further follow up by Hepatology if normal. Patient reports he has been discharged from the clinic. At last visit w Cardiology, BP was borderline elevated. No changes in medication were made.    Patient reports doing well on Clomiphene. He feels a significant difference in energy and has started going back to the gym. His fatigue is improved. He does have a CPAP now (tested positive for KHOA since last visit), but reports that he's not gotten used to it yet and finds mask uncomfortable, wakes up with it on the floor. He has tried his wife's mask and appears to do better with it, so is willing to try making a change. Sleeping is overall better. Anxiety is improved - not taking Sertraline.     He also c/o R shoulder discomfort, especially with overhead lifting. States that years ago he had MRI that showed a torn rotator cuff. He is not interested in Ortho nor PT at this time.     His blood pressure is wnl in clinic today.      Hospital Outpatient Visit on 03/21/2023   Component Date Value Ref Range Status    BSA 03/21/2023 2.26  m2 Final    TDI SEPTAL 03/21/2023 0.09  m/s Final    LV LATERAL E/E' RATIO 03/21/2023 7.36  m/s Final    LV SEPTAL E/E' RATIO 03/21/2023 9.00  m/s Final    Left  Ventricular Outflow Tract Chelsea* 03/21/2023 0.75  cm/s Final    Left Ventricular Outflow Tract Chelsea* 03/21/2023 3.00  mmHg Final    AORTIC VALVE CUSP SEPERATION 03/21/2023 2.20  cm Final    TDI LATERAL 03/21/2023 0.11  m/s Final    LVIDd 03/21/2023 4.36  3.5 - 6.0 cm Final    IVS 03/21/2023 0.86  0.6 - 1.1 cm Final    Posterior Wall 03/21/2023 1.36 (A)  0.6 - 1.1 cm Final    Ao root annulus 03/21/2023 3.70  cm Final    LVIDs 03/21/2023 3.03  2.1 - 4.0 cm Final    FS 03/21/2023 31  28 - 44 % Final    LV mass 03/21/2023 168.67  g Final    LA size 03/21/2023 3.70  cm Final    RVDD 03/21/2023 2.61  cm Final    Left Ventricle Relative Wall Thick* 03/21/2023 0.62  cm Final    AV mean gradient 03/21/2023 4  mmHg Final    AV valve area 03/21/2023 3.02  cm2 Final    AV Velocity Ratio 03/21/2023 0.91   Final    AV index (prosthetic) 03/21/2023 0.87   Final    MV valve area p 1/2 method 03/21/2023 4.07  cm2 Final    E/A ratio 03/21/2023 1.03   Final    Mean e' 03/21/2023 0.10  m/s Final    E wave deceleration time 03/21/2023 174.00  msec Final    IVRT 03/21/2023 106.00  msec Final    LVOT diameter 03/21/2023 2.10  cm Final    LVOT area 03/21/2023 3.5  cm2 Final    LVOT peak myranda 03/21/2023 1.27  m/s Final    LVOT peak VTI 03/21/2023 25.00  cm Final    Ao peak myranda 03/21/2023 1.40  m/s Final    Ao VTI 03/21/2023 28.7  cm Final    LVOT stroke volume 03/21/2023 86.55  cm3 Final    AV peak gradient 03/21/2023 8  mmHg Final    E/E' ratio 03/21/2023 8.10  m/s Final    MV Peak E Myranda 03/21/2023 0.81  m/s Final    TR Max Myranda 03/21/2023 2.04  m/s Final    MV stenosis pressure 1/2 time 03/21/2023 54.00  ms Final    MV Peak A Myranda 03/21/2023 0.79  m/s Final    LV Systolic Volume 03/21/2023 35.90  mL Final    LV Systolic Volume Index 03/21/2023 16.2  mL/m2 Final    LV Diastolic Volume 03/21/2023 85.80  mL Final    LV Diastolic Volume Index 03/21/2023 38.82  mL/m2 Final    LV Mass Index 03/21/2023 76  g/m2 Final    Triscuspid Valve Regurgitation  Pea* 03/21/2023 17  mmHg Final    EF 03/21/2023 65  % Final   Hospital Outpatient Visit on 03/21/2023   Component Date Value Ref Range Status    85% Max Predicted HR 03/21/2023 138   Final    Max Predicted HR 03/21/2023 162   Final    OHS CV CPX PATIENT IS MALE 03/21/2023 1.0   Final    [facility] CV CPX PATIENT IS FEMALE 03/21/2023 0.0   Final    EF + QEF 03/21/2023 53  % Final    Ejection Fraction- High Stress 03/21/2023 65  % Final    End diastolic index (mL/m2) 03/21/2023 93  mL/m2 Final    End diastolic index (mL/m2) 03/21/2023 153  mL/m2 Final    End systolic index (mL/m2) 03/21/2023 31  mL/m2 Final    End systolic index (mL/m2) 03/21/2023 71  mL/m2 Final    Nuc Rest EF 03/21/2023 68   Final    Nuc Rest Diastolic Volume Index 03/21/2023 100   Final    Nuc Rest Systolic Volume Index 03/21/2023 32   Final    Nuc Stress EF 03/21/2023 73  % Final    Nuc Rest Diastolic Volume Index 03/21/2023 102   Final    Nuc Rest Systolic Volume Index 03/21/2023 28   Final    Systolic blood pressure 03/21/2023 126  mmHg Final    Diastolic blood pressure 03/21/2023 86  mmHg Final    HR at rest 03/21/2023 145  bpm Final    Exercise duration (min) 03/21/2023 5  minutes Final    Exercise duration (sec) 03/21/2023 33  seconds Final    Peak Systolic BP 03/21/2023 176  mmHg Final    Peak Diatolic BP 03/21/2023 88  mmHg Final    Peak HR 03/21/2023 145  bpm Final    Estimated METs 03/21/2023 7   Final    % Max HR Achieved 03/21/2023 90   Final    1 Minute Recovery HR 03/21/2023 117  bpm Final    RPP 03/21/2023 18,270   Final    Peak RPP 03/21/2023 25,520   Final   Lab Visit on 03/20/2023   Component Date Value Ref Range Status    Free T4 03/20/2023 1.03  0.71 - 1.51 ng/dL Final    T3, Total 03/20/2023 118  60 - 180 ng/dL Final    Thyroglobulin, Tumor Marker 03/20/2023 1.7 (H)  ng/mL Final    Thyroglobulin Antibody Screen 03/20/2023 15 (H)  <1.8 IU/mL Final    Thyroglobulin Interpretation 03/20/2023 SEE BELOW   Final    TSH 03/20/2023 1.289   0.400 - 4.000 uIU/mL Final    Uric Acid 03/20/2023 7.4 (H)  3.4 - 7.0 mg/dL Final    Ionized Calcium 03/20/2023 1.26  1.06 - 1.42 mmol/L Final    Vit D, 25-Hydroxy 03/20/2023 43  30 - 96 ng/mL Final    PTH, Intact 03/20/2023 43.1  9.0 - 77.0 pg/mL Final    Cholesterol 03/20/2023 194  120 - 199 mg/dL Final    Triglycerides 03/20/2023 151 (H)  30 - 150 mg/dL Final    HDL 03/20/2023 60  40 - 75 mg/dL Final    LDL Cholesterol 03/20/2023 103.8  63.0 - 159.0 mg/dL Final    HDL/Cholesterol Ratio 03/20/2023 30.9  20.0 - 50.0 % Final    Total Cholesterol/HDL Ratio 03/20/2023 3.2  2.0 - 5.0 Final    Non-HDL Cholesterol 03/20/2023 134  mg/dL Final    Hemoglobin A1C 03/20/2023 4.8  4.0 - 5.6 % Final    Estimated Avg Glucose 03/20/2023 91  68 - 131 mg/dL Final    PSA Total 03/20/2023 2.1  0.00 - 4.00 ng/mL Final    PSA, Free 03/20/2023 0.41  0.00 - 1.50 ng/mL Final    PSA, Free % 03/20/2023 19.52  Not established % Final    Testosterone 03/20/2023 674  250 - 1100 ng/dL Final    Testosterone, Free 03/20/2023 39.9 (L)  46.0 - 224.0 pg/mL Final    Testosterone, Bioavailable 03/20/2023 85.5 (L)  110.0 - 575.0 ng/dL Final    Sex Hormone Binding Globulin 03/20/2023 84 (H)  22 - 77 nmol/L Final    Albumin 03/20/2023 4.7  3.6 - 5.1 g/dL Final    Dihydrotestosterone 03/20/2023 672  112 - 955 pg/mL Final    LH 03/20/2023 4.0  0.6 - 12.1 mIU/mL Final    Follicle Stimulating Hormone 03/20/2023 10.57  0.95 - 11.95 mIU/mL Final    Estradiol 03/20/2023 14  11 - 44 pg/mL Final    Estrogen 03/20/2023 141  pg/mL Final   Lab Visit on 03/20/2023   Component Date Value Ref Range Status    Specimen UA 03/20/2023 Urine, Clean Catch   Final    Color, UA 03/20/2023 Yellow  Yellow, Straw, Daphne Final    Appearance, UA 03/20/2023 Clear  Clear Final    pH, UA 03/20/2023 6.0  5.0 - 8.0 Final    Specific Gravity, UA 03/20/2023 1.010  1.005 - 1.030 Final    Protein, UA 03/20/2023 Negative  Negative Final    Glucose, UA 03/20/2023 Negative  Negative Final     Ketones, UA 03/20/2023 Negative  Negative Final    Bilirubin (UA) 03/20/2023 Negative  Negative Final    Occult Blood UA 03/20/2023 Negative  Negative Final    Nitrite, UA 03/20/2023 Negative  Negative Final    Leukocytes, UA 03/20/2023 Trace (A)  Negative Final    Microalbumin, Urine 03/20/2023 <5.0  ug/mL Final    Creatinine, Urine 03/20/2023 33.0  23.0 - 375.0 mg/dL Final    Microalb/Creat Ratio 03/20/2023 Unable to calculate  0.0 - 30.0 ug/mg Final    RBC, UA 03/20/2023 0  0 - 4 /hpf Final    WBC, UA 03/20/2023 0  0 - 5 /hpf Final    Microscopic Comment 03/20/2023 SEE COMMENT   Final   Lab Visit on 02/14/2023   Component Date Value Ref Range Status    Sodium 02/14/2023 140  136 - 145 mmol/L Final    Potassium 02/14/2023 4.5  3.5 - 5.1 mmol/L Final    Chloride 02/14/2023 108  95 - 110 mmol/L Final    CO2 02/14/2023 23  23 - 29 mmol/L Final    Glucose 02/14/2023 122 (H)  70 - 110 mg/dL Final    BUN 02/14/2023 20  6 - 20 mg/dL Final    Creatinine 02/14/2023 1.1  0.5 - 1.4 mg/dL Final    Calcium 02/14/2023 9.3  8.7 - 10.5 mg/dL Final    Total Protein 02/14/2023 7.3  6.0 - 8.4 g/dL Final    Albumin 02/14/2023 4.3  3.5 - 5.2 g/dL Final    Total Bilirubin 02/14/2023 0.8  0.1 - 1.0 mg/dL Final    Alkaline Phosphatase 02/14/2023 75  55 - 135 U/L Final    AST 02/14/2023 21  10 - 40 U/L Final    ALT 02/14/2023 29  10 - 44 U/L Final    Anion Gap 02/14/2023 9  8 - 16 mmol/L Final    eGFR 02/14/2023 >60  >60 mL/min/1.73 m^2 Final    WBC 02/14/2023 4.93  3.90 - 12.70 K/uL Final    RBC 02/14/2023 4.36 (L)  4.60 - 6.20 M/uL Final    Hemoglobin 02/14/2023 15.0  14.0 - 18.0 g/dL Final    Hematocrit 02/14/2023 43.3  40.0 - 54.0 % Final    MCV 02/14/2023 99 (H)  82 - 98 fL Final    MCH 02/14/2023 34.4 (H)  27.0 - 31.0 pg Final    MCHC 02/14/2023 34.6  32.0 - 36.0 g/dL Final    RDW 02/14/2023 12.7  11.5 - 14.5 % Final    Platelets 02/14/2023 179  150 - 450 K/uL Final    MPV 02/14/2023 9.3  9.2 - 12.9 fL Final    Immature Granulocytes  02/14/2023 0.2  0.0 - 0.5 % Final    Gran # (ANC) 02/14/2023 2.7  1.8 - 7.7 K/uL Final    Immature Grans (Abs) 02/14/2023 0.01  0.00 - 0.04 K/uL Final    Lymph # 02/14/2023 1.5  1.0 - 4.8 K/uL Final    Mono # 02/14/2023 0.5  0.3 - 1.0 K/uL Final    Eos # 02/14/2023 0.1  0.0 - 0.5 K/uL Final    Baso # 02/14/2023 0.05  0.00 - 0.20 K/uL Final    nRBC 02/14/2023 0  0 /100 WBC Final    Gran % 02/14/2023 55.7  38.0 - 73.0 % Final    Lymph % 02/14/2023 31.2  18.0 - 48.0 % Final    Mono % 02/14/2023 9.5  4.0 - 15.0 % Final    Eosinophil % 02/14/2023 2.4  0.0 - 8.0 % Final    Basophil % 02/14/2023 1.0  0.0 - 1.9 % Final    Differential Method 02/14/2023 Automated   Final   Lab Visit on 11/04/2022   Component Date Value Ref Range Status    Testosterone, Total 11/04/2022 363  304 - 1227 ng/dL Final    WBC 11/04/2022 5.40  3.90 - 12.70 K/uL Final    RBC 11/04/2022 4.00 (L)  4.60 - 6.20 M/uL Final    Hemoglobin 11/04/2022 14.1  14.0 - 18.0 g/dL Final    Hematocrit 11/04/2022 40.3  40.0 - 54.0 % Final    MCV 11/04/2022 101 (H)  82 - 98 fL Final    MCH 11/04/2022 35.3 (H)  27.0 - 31.0 pg Final    MCHC 11/04/2022 35.0  32.0 - 36.0 g/dL Final    RDW 11/04/2022 13.2  11.5 - 14.5 % Final    Platelets 11/04/2022 164  150 - 450 K/uL Final    MPV 11/04/2022 9.3  9.2 - 12.9 fL Final       Past Medical History:   Diagnosis Date    Arthritis     Chronic neck and back pain     DDD (degenerative disc disease), cervical     Essential hypertension 3/21/2023    Graves disease     Hyperthyroidism 11/18/2015    Other specified glaucoma 2/16/2022     Past Surgical History:   Procedure Laterality Date    COLONOSCOPY N/A 9/5/2018    Procedure: COLONOSCOPY;  Surgeon: Keith Arellano MD;  Location: [facility];  Service: Endoscopy;  Laterality: N/A;    FINGER SURGERY      removal of steel    KNEE SURGERY Left 01/2015    repair     Family History   Problem Relation Age of Onset    Heart disease Father 52        MI    Alcohol abuse Father     Macular  degeneration Mother     Melanoma Mother     Lupus Neg Hx     Eczema Neg Hx     Psoriasis Neg Hx     Glaucoma Neg Hx     Retinal detachment Neg Hx     Cirrhosis Neg Hx        Tobacco History:  reports that he quit smoking about 8 years ago. His smoking use included cigarettes. He has a 40.00 pack-year smoking history. He has never used smokeless tobacco.  Alcohol History:  reports current alcohol use of about 2.0 - 3.0 standard drinks per week.  Drug History:  reports no history of drug use.    Review of patient's allergies indicates:   Allergen Reactions    Bactrim [sulfamethoxazole-trimethoprim]      Possibly allergic reaction       Current Outpatient Medications:     albuterol (VENTOLIN HFA) 90 mcg/actuation inhaler, Inhale 1-2 puffs into the lungs every 6 (six) hours as needed for Wheezing or Shortness of Breath. Rescue, Disp: 18 g, Rfl: 0    b complex vitamins tablet, Take 1 tablet by mouth once daily., Disp: , Rfl:     betamethasone dipropionate (DIPROLENE) 0.05 % ointment, AAA hand BID PRN flare, Disp: 45 g, Rfl: 1    clobetasol (TEMOVATE) 0.05 % cream, Thin film to AA hands BID PRN flare, Disp: 45 g, Rfl: 1    clomiPHENE (CLOMID) 50 mg tablet, Take 1 tablet (50 mg total) by mouth once daily., Disp: 90 tablet, Rfl: 3    gabapentin (NEURONTIN) 300 MG capsule, Take 1 capsule (300 mg total) by mouth 3 (three) times daily., Disp: 90 capsule, Rfl: 0    meclizine (ANTIVERT) 25 mg tablet, TAKE ONE TABLET BY MOUTH THREE TIMES DAILY AS NEEDED, Disp: 30 tablet, Rfl: 1    meloxicam (MOBIC) 15 MG tablet, Take 15 mg by mouth once daily., Disp: , Rfl:     morphine (MS CONTIN) 15 MG 12 hr tablet, Take 15 mg by mouth as needed., Disp: , Rfl:     multivitamin capsule, Take 1 capsule by mouth once daily., Disp: , Rfl:     oxyCODONE (ROXICODONE) 15 MG Tab, Take 15 mg by mouth daily as needed., Disp: , Rfl:     tizanidine (ZANAFLEX) 4 MG tablet, Take 2 mg by mouth every 8 (eight) hours. , Disp: , Rfl:     vitamin E 200 UNIT  "capsule, Take 200 Units by mouth once daily., Disp: , Rfl:     aspirin (ECOTRIN) 81 MG EC tablet, Take 1 tablet (81 mg total) by mouth once daily., Disp: 90 tablet, Rfl: 3    rosuvastatin (CRESTOR) 20 MG tablet, Take 1 tablet (20 mg total) by mouth every evening., Disp: 90 tablet, Rfl: 3    Review of Systems  As in HPI           Objective:      Vitals:    04/21/23 1323   BP: 130/80   Pulse: 74   Resp: 18   SpO2: 98%   Weight: 105.2 kg (232 lb)   Height: 5' 10" (1.778 m)     Physical Exam  Vitals reviewed.   Constitutional:       General: He is not in acute distress.     Appearance: He is not ill-appearing.      Comments: Appears in better spirits than previous visits. Also more fit, more muscular.   Cardiovascular:      Rate and Rhythm: Normal rate and regular rhythm.      Pulses: Normal pulses.      Heart sounds: Normal heart sounds.   Pulmonary:      Effort: Pulmonary effort is normal.      Breath sounds: Normal breath sounds.   Skin:     General: Skin is warm and dry.   Neurological:      Mental Status: He is alert and oriented to person, place, and time.   Psychiatric:         Mood and Affect: Mood normal.         Behavior: Behavior normal.            Assessment:       1. Fatigue, unspecified type    2. Anxiety    3. KHOA (obstructive sleep apnea)    4. Low testosterone in male    5. Aortic atherosclerosis    6. Solitary pulmonary nodule    7. Nodular thyroid disease    8. Chronic right shoulder pain    9. Hx Liver fibrosis, now F0 as of 2023             Plan:       Fatigue, unspecified type    Anxiety    KHOA (obstructive sleep apnea)    Low testosterone in male    Aortic atherosclerosis  -     aspirin (ECOTRIN) 81 MG EC tablet; Take 1 tablet (81 mg total) by mouth once daily.  Dispense: 90 tablet; Refill: 3  -     rosuvastatin (CRESTOR) 20 MG tablet; Take 1 tablet (20 mg total) by mouth every evening.  Dispense: 90 tablet; Refill: 3    Solitary pulmonary nodule    Nodular thyroid disease    Chronic right " shoulder pain    Hx Liver fibrosis, now F0 as of 2023    -Fatigue improved now that on Clomiphene as well as addressing KHOA. Continue course as rx'd.  - Doing well re: anxiety. Will discontinue Sertraline.  - Reviewed different masks available; will try changing for better compliance with CPAP.  - Declines referral to PT or Ortho; prefers to do own exercises for shoulder. Provided with rotator cuff tear exercises.  - Appreciate management of Thyroid disease and low testosterone by Dr Michelle.  - Patient now discharged from Dr Pickens' office w resolved fibrosis.  - Reminded him he will need repeat Chest CT Feb 2024.  - BP wnl today.  - Refills issued as requested.    Follow up in about 6 months (around 10/21/2023) for chronic issues.        4/22/2023 Yumiko Chavarria M.D.

## 2023-04-22 PROBLEM — M25.511 CHRONIC RIGHT SHOULDER PAIN: Status: ACTIVE | Noted: 2023-04-22

## 2023-04-22 PROBLEM — G89.29 CHRONIC RIGHT SHOULDER PAIN: Status: ACTIVE | Noted: 2023-04-22

## 2023-04-22 PROBLEM — I10 ESSENTIAL HYPERTENSION: Status: RESOLVED | Noted: 2023-03-21 | Resolved: 2023-04-22

## 2023-05-09 ENCOUNTER — HOSPITAL ENCOUNTER (OUTPATIENT)
Dept: RADIOLOGY | Facility: HOSPITAL | Age: 58
Discharge: HOME | End: 2023-05-09
Attending: INTERNAL MEDICINE
Payer: COMMERCIAL

## 2023-05-09 DIAGNOSIS — E04.9 GOITER: ICD-10-CM

## 2023-05-09 DIAGNOSIS — E04.1 NODULAR THYROID DISEASE: ICD-10-CM

## 2023-05-09 PROCEDURE — 76536 US EXAM OF HEAD AND NECK: CPT | Mod: TC

## 2023-05-09 PROCEDURE — 76536 US EXAM OF HEAD AND NECK: CPT | Mod: 26,,, | Performed by: RADIOLOGY

## 2023-05-09 PROCEDURE — 76536 US SOFT TISSUE HEAD NECK THYROID: ICD-10-PCS | Mod: 26,,, | Performed by: RADIOLOGY

## 2023-05-17 ENCOUNTER — OFFICE VISIT (OUTPATIENT)
Dept: SURGERY | Facility: CLINIC | Age: 58
End: 2023-05-17
Payer: COMMERCIAL

## 2023-05-17 VITALS
DIASTOLIC BLOOD PRESSURE: 85 MMHG | OXYGEN SATURATION: 98 % | TEMPERATURE: 98 F | HEIGHT: 70 IN | SYSTOLIC BLOOD PRESSURE: 168 MMHG | WEIGHT: 237.13 LBS | BODY MASS INDEX: 33.95 KG/M2 | HEART RATE: 66 BPM

## 2023-05-17 DIAGNOSIS — D13.5 ADENOMYOMATOSIS OF GALLBLADDER: ICD-10-CM

## 2023-05-17 PROCEDURE — 3061F PR NEG MICROALBUMINURIA RESULT DOCUMENTED/REVIEW: ICD-10-PCS | Mod: CPTII,S$GLB,, | Performed by: STUDENT IN AN ORGANIZED HEALTH CARE EDUCATION/TRAINING PROGRAM

## 2023-05-17 PROCEDURE — 99999 PR PBB SHADOW E&M-EST. PATIENT-LVL IV: ICD-10-PCS | Mod: PBBFAC,,, | Performed by: STUDENT IN AN ORGANIZED HEALTH CARE EDUCATION/TRAINING PROGRAM

## 2023-05-17 PROCEDURE — 99999 PR PBB SHADOW E&M-EST. PATIENT-LVL IV: CPT | Mod: PBBFAC,,, | Performed by: STUDENT IN AN ORGANIZED HEALTH CARE EDUCATION/TRAINING PROGRAM

## 2023-05-17 PROCEDURE — 1160F PR REVIEW ALL MEDS BY PRESCRIBER/CLIN PHARMACIST DOCUMENTED: ICD-10-PCS | Mod: CPTII,S$GLB,, | Performed by: STUDENT IN AN ORGANIZED HEALTH CARE EDUCATION/TRAINING PROGRAM

## 2023-05-17 PROCEDURE — 1159F PR MEDICATION LIST DOCUMENTED IN MEDICAL RECORD: ICD-10-PCS | Mod: CPTII,S$GLB,, | Performed by: STUDENT IN AN ORGANIZED HEALTH CARE EDUCATION/TRAINING PROGRAM

## 2023-05-17 PROCEDURE — 3044F PR MOST RECENT HEMOGLOBIN A1C LEVEL <7.0%: ICD-10-PCS | Mod: CPTII,S$GLB,, | Performed by: STUDENT IN AN ORGANIZED HEALTH CARE EDUCATION/TRAINING PROGRAM

## 2023-05-17 PROCEDURE — 3008F BODY MASS INDEX DOCD: CPT | Mod: CPTII,S$GLB,, | Performed by: STUDENT IN AN ORGANIZED HEALTH CARE EDUCATION/TRAINING PROGRAM

## 2023-05-17 PROCEDURE — 3077F SYST BP >= 140 MM HG: CPT | Mod: CPTII,S$GLB,, | Performed by: STUDENT IN AN ORGANIZED HEALTH CARE EDUCATION/TRAINING PROGRAM

## 2023-05-17 PROCEDURE — 3079F DIAST BP 80-89 MM HG: CPT | Mod: CPTII,S$GLB,, | Performed by: STUDENT IN AN ORGANIZED HEALTH CARE EDUCATION/TRAINING PROGRAM

## 2023-05-17 PROCEDURE — 3079F PR MOST RECENT DIASTOLIC BLOOD PRESSURE 80-89 MM HG: ICD-10-PCS | Mod: CPTII,S$GLB,, | Performed by: STUDENT IN AN ORGANIZED HEALTH CARE EDUCATION/TRAINING PROGRAM

## 2023-05-17 PROCEDURE — 3066F PR DOCUMENTATION OF TREATMENT FOR NEPHROPATHY: ICD-10-PCS | Mod: CPTII,S$GLB,, | Performed by: STUDENT IN AN ORGANIZED HEALTH CARE EDUCATION/TRAINING PROGRAM

## 2023-05-17 PROCEDURE — 3061F NEG MICROALBUMINURIA REV: CPT | Mod: CPTII,S$GLB,, | Performed by: STUDENT IN AN ORGANIZED HEALTH CARE EDUCATION/TRAINING PROGRAM

## 2023-05-17 PROCEDURE — 3066F NEPHROPATHY DOC TX: CPT | Mod: CPTII,S$GLB,, | Performed by: STUDENT IN AN ORGANIZED HEALTH CARE EDUCATION/TRAINING PROGRAM

## 2023-05-17 PROCEDURE — 1159F MED LIST DOCD IN RCRD: CPT | Mod: CPTII,S$GLB,, | Performed by: STUDENT IN AN ORGANIZED HEALTH CARE EDUCATION/TRAINING PROGRAM

## 2023-05-17 PROCEDURE — 99204 OFFICE O/P NEW MOD 45 MIN: CPT | Mod: S$GLB,,, | Performed by: STUDENT IN AN ORGANIZED HEALTH CARE EDUCATION/TRAINING PROGRAM

## 2023-05-17 PROCEDURE — 3044F HG A1C LEVEL LT 7.0%: CPT | Mod: CPTII,S$GLB,, | Performed by: STUDENT IN AN ORGANIZED HEALTH CARE EDUCATION/TRAINING PROGRAM

## 2023-05-17 PROCEDURE — 3077F PR MOST RECENT SYSTOLIC BLOOD PRESSURE >= 140 MM HG: ICD-10-PCS | Mod: CPTII,S$GLB,, | Performed by: STUDENT IN AN ORGANIZED HEALTH CARE EDUCATION/TRAINING PROGRAM

## 2023-05-17 PROCEDURE — 1160F RVW MEDS BY RX/DR IN RCRD: CPT | Mod: CPTII,S$GLB,, | Performed by: STUDENT IN AN ORGANIZED HEALTH CARE EDUCATION/TRAINING PROGRAM

## 2023-05-17 PROCEDURE — 99204 PR OFFICE/OUTPT VISIT, NEW, LEVL IV, 45-59 MIN: ICD-10-PCS | Mod: S$GLB,,, | Performed by: STUDENT IN AN ORGANIZED HEALTH CARE EDUCATION/TRAINING PROGRAM

## 2023-05-17 PROCEDURE — 3008F PR BODY MASS INDEX (BMI) DOCUMENTED: ICD-10-PCS | Mod: CPTII,S$GLB,, | Performed by: STUDENT IN AN ORGANIZED HEALTH CARE EDUCATION/TRAINING PROGRAM

## 2023-05-17 NOTE — PROGRESS NOTES
[facility]  General Surgery  History & Physical  Date: 05/17/2023  Referring Provider: Narciso Pickens    SUBJECTIVE:     Chief complaint: incidental finding of GB wall adenomyomatosis    History of Present Illness:  Patient is a 57 y.o. male with a history of HTN, Graves' disease, chronic neck and back pain, and cervical degenerative disc disease who presents to clinic today after being referred by Gastroenterology due to an incidental finding of gallbladder wall adenomyomatosis on a recent ultrasound.  He has a past medical history of nonalcoholic fatty liver disease of unknown etiology.  In the past, he did have some functional liver tests which were abnormal.  He has been followed by Gastroenterology since that time with serial ultrasounds of his abdomen.  On his most recent ultrasound in February of 2023 was found to have a new findings of gallbladder wall adenomyomatosis.  He denies any signs/symptoms of biliary disease such as abdominal pain, fever, chills, nausea, or vomiting.     Review of patient's allergies indicates:   Allergen Reactions    Bactrim [sulfamethoxazole-trimethoprim]      Possibly allergic reaction       Current Outpatient Medications   Medication Sig Dispense Refill    albuterol (VENTOLIN HFA) 90 mcg/actuation inhaler Inhale 1-2 puffs into the lungs every 6 (six) hours as needed for Wheezing or Shortness of Breath. Rescue 18 g 0    aspirin (ECOTRIN) 81 MG EC tablet Take 1 tablet (81 mg total) by mouth once daily. 90 tablet 3    b complex vitamins tablet Take 1 tablet by mouth once daily.      betamethasone dipropionate (DIPROLENE) 0.05 % ointment AAA hand BID PRN flare 45 g 1    clobetasol (TEMOVATE) 0.05 % cream Thin film to AA hands BID PRN flare 45 g 1    clomiPHENE (CLOMID) 50 mg tablet Take 1 tablet (50 mg total) by mouth once daily. 90 tablet 3    gabapentin (NEURONTIN) 300 MG capsule Take 1 capsule (300 mg total) by mouth 3 (three) times daily. 90 capsule 0     meclizine (ANTIVERT) 25 mg tablet TAKE ONE TABLET BY MOUTH THREE TIMES DAILY AS NEEDED 30 tablet 1    meloxicam (MOBIC) 15 MG tablet Take 15 mg by mouth once daily.      morphine (MS CONTIN) 15 MG 12 hr tablet Take 15 mg by mouth as needed.      multivitamin capsule Take 1 capsule by mouth once daily.      oxyCODONE (ROXICODONE) 15 MG Tab Take 15 mg by mouth daily as needed.      rosuvastatin (CRESTOR) 20 MG tablet Take 1 tablet (20 mg total) by mouth every evening. 90 tablet 3    tizanidine (ZANAFLEX) 4 MG tablet Take 2 mg by mouth every 8 (eight) hours.       vitamin E 200 UNIT capsule Take 200 Units by mouth once daily.       No current facility-administered medications for this visit.       Past Medical History:   Diagnosis Date    Arthritis     Chronic neck and back pain     DDD (degenerative disc disease), cervical     Essential hypertension 3/21/2023    Graves disease     Hyperthyroidism 2015    Other specified glaucoma 2022     Past Surgical History:   Procedure Laterality Date    COLONOSCOPY N/A 2018    Procedure: COLONOSCOPY;  Surgeon: Keith Arellano MD;  Location: [facility];  Service: Endoscopy;  Laterality: N/A;    FINGER SURGERY      removal of steel    KNEE SURGERY Left 2015    repair     Family History   Problem Relation Age of Onset    Heart disease Father 52        MI    Alcohol abuse Father     Macular degeneration Mother     Melanoma Mother     Lupus Neg Hx     Eczema Neg Hx     Psoriasis Neg Hx     Glaucoma Neg Hx     Retinal detachment Neg Hx     Cirrhosis Neg Hx      Social History     Tobacco Use    Smoking status: Former     Packs/day: 2.00     Years: 20.00     Pack years: 40.00     Types: Cigarettes     Quit date: 2014     Years since quittin.5    Smokeless tobacco: Never   Substance Use Topics    Alcohol use: Yes     Alcohol/week: 2.0 - 3.0 standard drinks     Types: 2 - 3 Cans of beer per week     Comment: 2 times a week     Drug use: No        Review of  "Systems:  A detailed review of systems has been reviewed with the patient, pertinent positives and negatives are presented in the note and is otherwise negative.  Review of Systems   Constitutional: Negative.  Negative for activity change, fatigue and fever.   HENT: Negative.  Negative for congestion, rhinorrhea and trouble swallowing.    Eyes:  Negative for discharge.   Respiratory: Negative.  Negative for cough, chest tightness and shortness of breath.    Cardiovascular: Negative.  Negative for chest pain and palpitations.   Gastrointestinal: Negative.  Negative for anal bleeding, constipation, diarrhea, nausea and vomiting.   Endocrine: Negative.  Negative for cold intolerance and heat intolerance.   Genitourinary: Negative.  Negative for decreased urine volume, difficulty urinating, dysuria and urgency.   Musculoskeletal:  Positive for neck stiffness. Negative for back pain, joint swelling and neck pain.   Skin:  Negative for color change and wound.   Neurological: Negative.  Negative for syncope, weakness and light-headedness.   Hematological: Negative.  Negative for adenopathy. Does not bruise/bleed easily.   Psychiatric/Behavioral: Negative.       OBJECTIVE:     Vital Signs (Most Recent)  Temp: 98.2 °F (36.8 °C) (05/17/23 0931)  Pulse: 66 (05/17/23 0931)  BP: (!) 168/85 (05/17/23 0931)  SpO2: 98 % (05/17/23 0931)  5' 10" (1.778 m)  107.6 kg (237 lb 1.7 oz)     Physical Exam:  Physical Exam  Vitals and nursing note reviewed.   Constitutional:       Appearance: Normal appearance. He is not ill-appearing.   HENT:      Head: Normocephalic.      Mouth/Throat:      Mouth: Mucous membranes are moist.      Pharynx: Oropharynx is clear.   Eyes:      General: No scleral icterus.     Extraocular Movements: Extraocular movements intact.      Conjunctiva/sclera: Conjunctivae normal.   Cardiovascular:      Rate and Rhythm: Normal rate.      Pulses: Normal pulses.   Pulmonary:      Effort: Pulmonary effort is normal. No " respiratory distress.      Breath sounds: No wheezing.   Abdominal:      General: Abdomen is flat. Bowel sounds are normal. There is no distension.      Palpations: Abdomen is soft.      Tenderness: There is no abdominal tenderness.   Musculoskeletal:         General: No swelling or tenderness. Normal range of motion.      Cervical back: Normal range of motion.   Skin:     General: Skin is warm and dry.      Coloration: Skin is not jaundiced or pale.   Neurological:      General: No focal deficit present.      Mental Status: He is alert and oriented to person, place, and time.   Psychiatric:         Mood and Affect: Mood normal.       Laboratory:  I personally and independently reviewed relevant lab test results, including the following:  CBC 02/14/23: Reviewed  CMP 02/14/23: Reviewed  Hgb A1c 03/20/23: Reviewed  Lipid panel 03/20/23: Reviewed    Diagnostic Results:  I personally and independently reviewed, visualized and interpreted the images of the below listed radiology studies and my findings are notable for: no significant GB abnormalities besides what's described. No stones or large polyps.  US 02/14/2023: Reviewed    EXAMINATION:  US ABDOMEN LIMITED_LIVER     CLINICAL HISTORY:  .     Fatty (change of) liver, not elsewhere classified     TECHNIQUE:  Limited ultrasound of the right upper quadrant of the abdomen including pancreas, liver, gallbladder, common bile duct was performed.     COMPARISON:  01/13/2021     FINDINGS:  Liver: Normal in size, measuring 14.1 cm. Homogeneous echotexture. No focal hepatic lesions.     Gallbladder: Echogenic focus with comet tail artifact may reflect adenomyomatosis.  No calculi, hyperemia, or pericholecystic fluid.  No sonographic Tapia's sign.     Biliary system: The common duct is not dilated, measuring 3 mm.  No intrahepatic ductal dilatation.     Spleen: Mildly enlarged, measuring 14 x 5 cm.     Pancreas: The visualized portions of pancreas appear normal.      Miscellaneous: No ascites.     Impression:     1. No focal hepatic lesion.  2. Gallbladder adenomyomatosis.  3. Splenomegaly.    CT 02/20/2021: Reviewed    EXAMINATION:  CT ABDOMEN PELVIS WITH CONTRAST     CLINICAL HISTORY:  abdominal mass; Intra-abdominal and pelvic swelling, mass and lump, unspecified site     TECHNIQUE:  Low dose axial images, sagittal and coronal reformations were obtained from the lung bases to the pubic symphysis following the IV administration of 100 mL of Omnipaque 350 .   The patient drank 1000 cc Omnipaque 9..  A marker was placed at the location of the palpable mass indicated by the patient.     COMPARISON:  CT of the chest 11/09/2017, abdominal ultrasound 01/13/2021     FINDINGS:  The visualized portions of the lung bases are clear.     There is mild fatty infiltration of the liver.  The gallbladder, spleen, adrenal glands, pancreas and kidneys are normal.  Splenic size is at the upper limits of normal.     Urinary bladder mildly distended but otherwise unremarkable.  Prostate prominent in size.  Aorta normal in caliber with scattered calcific plaque.     There is no obstruction or inflammation in the bowel.  There is no adenopathy or ascites.     No abnormalities seen in the right lower quadrant abdominal wall at the site of indicated palpable mass.  Specifically, no discrete, encapsulated lipoma is identified.  There is no ventral hernia.  There is no intra-abdominal mass.     Degenerative changes noted in spine.     Impression:     There is no CT abnormality at the site of indicated palpable lump.  A subtle lipoma may blend with the adjacent subcutaneous fat.    ASSESSMENT/PLAN:   Narciso Mckeon is a 57 year-old man presenting with asymptomatic gallbladder wall adenomyomatosis    PLAN:  - I discussed with him that asymptomatic gallbladder wall adenomyomatosis is not surgically treated. If he starts having symptoms of biliary disease, such as bloating, post-prandial RUQ  abdominal pain, intolerance of fatty meals, nausea, diarrhea with fatty meals, then we would discuss removing his gallbladder. He can follow up with our clinic as needed.      Russ West MD  General Surgery and Surgical Critical Care  [facility] 2nd Fl

## 2023-09-14 DIAGNOSIS — M25.561 RIGHT KNEE PAIN, UNSPECIFIED CHRONICITY: Primary | ICD-10-CM

## 2023-09-15 ENCOUNTER — OFFICE VISIT (OUTPATIENT)
Dept: ORTHOPEDICS | Facility: CLINIC | Age: 58
End: 2023-09-15
Payer: COMMERCIAL

## 2023-09-15 ENCOUNTER — HOSPITAL ENCOUNTER (OUTPATIENT)
Dept: RADIOLOGY | Facility: HOSPITAL | Age: 58
Discharge: HOME | End: 2023-09-15
Attending: ORTHOPAEDIC SURGERY
Payer: COMMERCIAL

## 2023-09-15 VITALS — HEIGHT: 70 IN | RESPIRATION RATE: 15 BRPM | WEIGHT: 237.19 LBS | BODY MASS INDEX: 33.96 KG/M2

## 2023-09-15 DIAGNOSIS — M17.11 PRIMARY OSTEOARTHRITIS OF RIGHT KNEE: Primary | ICD-10-CM

## 2023-09-15 DIAGNOSIS — M25.561 RIGHT KNEE PAIN, UNSPECIFIED CHRONICITY: ICD-10-CM

## 2023-09-15 PROCEDURE — 73562 X-RAY EXAM OF KNEE 3: CPT | Mod: 26,LT,, | Performed by: RADIOLOGY

## 2023-09-15 PROCEDURE — 73564 X-RAY EXAM KNEE 4 OR MORE: CPT | Mod: 26,RT,, | Performed by: RADIOLOGY

## 2023-09-15 PROCEDURE — 20610 DRAIN/INJ JOINT/BURSA W/O US: CPT | Mod: PBBFAC,PO,RT | Performed by: ORTHOPAEDIC SURGERY

## 2023-09-15 PROCEDURE — 73562 XR KNEE ORTHO RIGHT WITH FLEXION: ICD-10-PCS | Mod: 26,LT,, | Performed by: RADIOLOGY

## 2023-09-15 PROCEDURE — 99213 OFFICE O/P EST LOW 20 MIN: CPT | Mod: PBBFAC,PO | Performed by: ORTHOPAEDIC SURGERY

## 2023-09-15 PROCEDURE — 99999 PR PBB SHADOW E&M-EST. PATIENT-LVL III: ICD-10-PCS | Mod: PBBFAC,,, | Performed by: ORTHOPAEDIC SURGERY

## 2023-09-15 PROCEDURE — 73562 X-RAY EXAM OF KNEE 3: CPT | Mod: 59,TC,PO,LT

## 2023-09-15 PROCEDURE — 20610 LARGE JOINT ASPIRATION/INJECTION: R KNEE: ICD-10-PCS | Mod: RT,S$GLB,, | Performed by: ORTHOPAEDIC SURGERY

## 2023-09-15 PROCEDURE — 99204 OFFICE O/P NEW MOD 45 MIN: CPT | Mod: 25,S$GLB,, | Performed by: ORTHOPAEDIC SURGERY

## 2023-09-15 PROCEDURE — 73564 XR KNEE ORTHO RIGHT WITH FLEXION: ICD-10-PCS | Mod: 26,RT,, | Performed by: RADIOLOGY

## 2023-09-15 PROCEDURE — 99204 PR OFFICE/OUTPT VISIT, NEW, LEVL IV, 45-59 MIN: ICD-10-PCS | Mod: 25,S$GLB,, | Performed by: ORTHOPAEDIC SURGERY

## 2023-09-15 PROCEDURE — 99999 PR PBB SHADOW E&M-EST. PATIENT-LVL III: CPT | Mod: PBBFAC,,, | Performed by: ORTHOPAEDIC SURGERY

## 2023-09-15 RX ORDER — TRIAMCINOLONE ACETONIDE 40 MG/ML
40 INJECTION, SUSPENSION INTRA-ARTICULAR; INTRAMUSCULAR
Status: DISCONTINUED | OUTPATIENT
Start: 2023-09-15 | End: 2023-09-15 | Stop reason: HOSPADM

## 2023-09-15 RX ADMIN — TRIAMCINOLONE ACETONIDE 40 MG: 40 INJECTION, SUSPENSION INTRA-ARTICULAR; INTRAMUSCULAR at 11:09

## 2023-09-15 NOTE — PROGRESS NOTES
Subjective:      Patient ID: Narciso Mckeon is a 57 y.o. male.    Chief Complaint: Pain and Swelling of the Right Knee    HPI  57-year-old male long history of bilateral knee pain.  Diagnosed with arthritis in the past.  He is had his left knee scope.  He is complaining of intermittent pain swelling and stiffness in the right knee primarily.  Up to 5/10 level.  Had an issue in the past with viscosupplementation that he describes being told that he had a vasovagal response and essentially passed out from the injections.  ROS      Objective:    Ortho Exam     Constitutional:   Patient is alert  and oriented in no acute distress  HEENT:  normocephalic atraumatic; PERRL EOMI  Neck:  Supple without adenopathy  Cardiovascular:  Normal rate and rhythm  Pulmonary:  Normal respiratory effort normal chest wall expansion  Abdominal:  Nonprotuberant nondistended  Musculoskeletal:  Patient has a steady minimally antalgic gait  Slight varus knees mild diffuse medial tenderness  No gross instability no gross effusions  Adequate range of motion and motor strength does have a little difficulty with full extension  Neurological:  No focal defect; cranial nerves 2-12 grossly intact  Psychiatric/behavioral:  Mood and behavior normal           My Radiographs Findings:    Radiographs of his knee show moderate degenerative changes no acute osseous abnormalities noted.  Assessment:       Encounter Diagnosis   Name Primary?    Primary osteoarthritis of right knee Yes         Plan:       I have discussed medical condition treatment options him at length.  After a verbal consent and sterile prep I injected his most symptomatic right knee today without complication using combination of cc of Kenalog and several cc of lidocaine.  He he will have he will continue with NSAIDs per his family physician.  GI cardiac and renal precautions were discussed.  I have encouraged ongoing general knee rehab exercises.  I have discouraged high impact  activities.  Follow up can be as needed.        Past Medical History:   Diagnosis Date    Arthritis     Chronic neck and back pain     DDD (degenerative disc disease), cervical     Essential hypertension 3/21/2023    Graves disease     Hyperthyroidism 11/18/2015    Other specified glaucoma 2/16/2022     Past Surgical History:   Procedure Laterality Date    COLONOSCOPY N/A 9/5/2018    Procedure: COLONOSCOPY;  Surgeon: Keith Arellano MD;  Location: [facility];  Service: Endoscopy;  Laterality: N/A;    FINGER SURGERY      removal of steel    KNEE SURGERY Left 01/2015    repair         Current Outpatient Medications:     albuterol (VENTOLIN HFA) 90 mcg/actuation inhaler, Inhale 1-2 puffs into the lungs every 6 (six) hours as needed for Wheezing or Shortness of Breath. Rescue, Disp: 18 g, Rfl: 0    aspirin (ECOTRIN) 81 MG EC tablet, Take 1 tablet (81 mg total) by mouth once daily., Disp: 90 tablet, Rfl: 3    b complex vitamins tablet, Take 1 tablet by mouth once daily., Disp: , Rfl:     betamethasone dipropionate (DIPROLENE) 0.05 % ointment, AAA hand BID PRN flare, Disp: 45 g, Rfl: 1    clobetasol (TEMOVATE) 0.05 % cream, Thin film to AA hands BID PRN flare, Disp: 45 g, Rfl: 1    gabapentin (NEURONTIN) 300 MG capsule, Take 1 capsule (300 mg total) by mouth 3 (three) times daily., Disp: 90 capsule, Rfl: 0    meclizine (ANTIVERT) 25 mg tablet, TAKE ONE TABLET BY MOUTH THREE TIMES DAILY AS NEEDED, Disp: 30 tablet, Rfl: 1    meloxicam (MOBIC) 15 MG tablet, Take 15 mg by mouth once daily., Disp: , Rfl:     morphine (MS CONTIN) 15 MG 12 hr tablet, Take 15 mg by mouth as needed., Disp: , Rfl:     multivitamin capsule, Take 1 capsule by mouth once daily., Disp: , Rfl:     oxyCODONE (ROXICODONE) 15 MG Tab, Take 15 mg by mouth daily as needed., Disp: , Rfl:     rosuvastatin (CRESTOR) 20 MG tablet, Take 1 tablet (20 mg total) by mouth every evening., Disp: 90 tablet, Rfl: 3    tizanidine (ZANAFLEX) 4 MG tablet, Take 2 mg by mouth  every 8 (eight) hours. , Disp: , Rfl:     vitamin E 200 UNIT capsule, Take 200 Units by mouth once daily., Disp: , Rfl:     Review of patient's allergies indicates:   Allergen Reactions    Bactrim [sulfamethoxazole-trimethoprim]      Possibly allergic reaction       Family History   Problem Relation Age of Onset    Heart disease Father 52        MI    Alcohol abuse Father     Macular degeneration Mother     Melanoma Mother     Lupus Neg Hx     Eczema Neg Hx     Psoriasis Neg Hx     Glaucoma Neg Hx     Retinal detachment Neg Hx     Cirrhosis Neg Hx      Social History     Occupational History    Occupation: disabled   Tobacco Use    Smoking status: Former     Current packs/day: 0.00     Average packs/day: 2.0 packs/day for 20.0 years (40.0 ttl pk-yrs)     Types: Cigarettes     Start date: 1994     Quit date: 2014     Years since quittin.8    Smokeless tobacco: Never   Substance and Sexual Activity    Alcohol use: Yes     Alcohol/week: 2.0 - 3.0 standard drinks of alcohol     Types: 2 - 3 Cans of beer per week     Comment: 2 times a week     Drug use: No    Sexual activity: Yes     Partners: Female     Birth control/protection: None

## 2023-09-15 NOTE — PROCEDURES
Large Joint Aspiration/Injection: R knee    Date/Time: 9/15/2023 11:15 AM    Performed by: Efrain Montanez MD  Authorized by: Efrain Montanez MD    Consent Done?:  Yes (Verbal)  Indications:  Pain  Site marked: the procedure site was marked    Timeout: prior to procedure the correct patient, procedure, and site was verified    Local anesthetic: Ropivicaine.  Anesthetic total (ml):  3      Details:  Needle Size:  20 G  Approach:  Anterolateral  Location:  Knee  Site:  R knee  Medications:  40 mg triamcinolone acetonide 40 mg/mL  Patient tolerance:  Patient tolerated the procedure well with no immediate complications

## 2024-02-27 DIAGNOSIS — Z00.00 ENCOUNTER FOR MEDICARE ANNUAL WELLNESS EXAM: ICD-10-CM
